# Patient Record
Sex: FEMALE | Race: BLACK OR AFRICAN AMERICAN | Employment: UNEMPLOYED | URBAN - METROPOLITAN AREA
[De-identification: names, ages, dates, MRNs, and addresses within clinical notes are randomized per-mention and may not be internally consistent; named-entity substitution may affect disease eponyms.]

---

## 2017-04-03 ENCOUNTER — HOSPITAL ENCOUNTER (OUTPATIENT)
Dept: PULMONOLOGY | Age: 57
Discharge: HOME | End: 2017-04-03
Payer: MEDICAID

## 2017-04-03 PROCEDURE — 6360000002 HC RX W HCPCS: Performed by: INTERNAL MEDICINE

## 2017-04-03 PROCEDURE — 94728 AIRWY RESIST BY OSCILLOMETRY: CPT

## 2017-04-03 PROCEDURE — 94060 EVALUATION OF WHEEZING: CPT

## 2017-04-03 PROCEDURE — 94726 PLETHYSMOGRAPHY LUNG VOLUMES: CPT

## 2017-04-03 RX ORDER — ALBUTEROL SULFATE 2.5 MG/3ML
2.5 SOLUTION RESPIRATORY (INHALATION) ONCE
Status: COMPLETED | OUTPATIENT
Start: 2017-04-03 | End: 2017-04-03

## 2017-04-03 RX ADMIN — ALBUTEROL SULFATE 2.5 MG: 2.5 SOLUTION RESPIRATORY (INHALATION) at 13:57

## 2017-09-15 ENCOUNTER — APPOINTMENT (OUTPATIENT)
Dept: GENERAL RADIOLOGY | Facility: CLINIC | Age: 57
End: 2017-09-15
Payer: MEDICAID

## 2017-09-15 ENCOUNTER — HOSPITAL ENCOUNTER (EMERGENCY)
Facility: CLINIC | Age: 57
Discharge: HOME | End: 2017-09-15
Attending: EMERGENCY MEDICINE
Payer: MEDICAID

## 2017-09-15 VITALS
TEMPERATURE: 98.2 F | SYSTOLIC BLOOD PRESSURE: 169 MMHG | OXYGEN SATURATION: 99 % | DIASTOLIC BLOOD PRESSURE: 92 MMHG | BODY MASS INDEX: 33.13 KG/M2 | HEART RATE: 84 BPM | HEIGHT: 63 IN | WEIGHT: 187 LBS | RESPIRATION RATE: 16 BRPM

## 2017-09-15 DIAGNOSIS — J40 BRONCHITIS: Primary | ICD-10-CM

## 2017-09-15 PROCEDURE — 99283 EMERGENCY DEPT VISIT LOW MDM: CPT

## 2017-09-15 PROCEDURE — 6370000000 HC RX 637 (ALT 250 FOR IP): Performed by: EMERGENCY MEDICINE

## 2017-09-15 PROCEDURE — 71020 XR CHEST STANDARD TWO VW: CPT

## 2017-09-15 RX ORDER — AZITHROMYCIN 250 MG/1
500 TABLET, FILM COATED ORAL ONCE
Status: COMPLETED | OUTPATIENT
Start: 2017-09-15 | End: 2017-09-15

## 2017-09-15 RX ORDER — ALBUTEROL SULFATE 90 UG/1
2 AEROSOL, METERED RESPIRATORY (INHALATION) EVERY 6 HOURS PRN
Status: DISCONTINUED | OUTPATIENT
Start: 2017-09-15 | End: 2017-09-16 | Stop reason: HOSPADM

## 2017-09-15 RX ORDER — AZITHROMYCIN 250 MG/1
TABLET, FILM COATED ORAL
Qty: 1 PACKET | Refills: 0 | Status: SHIPPED | OUTPATIENT
Start: 2017-09-15 | End: 2018-03-22 | Stop reason: CLARIF

## 2017-09-15 RX ADMIN — AZITHROMYCIN 500 MG: 250 TABLET, FILM COATED ORAL at 22:19

## 2017-09-15 RX ADMIN — ALBUTEROL SULFATE 2 PUFF: 108 AEROSOL, METERED RESPIRATORY (INHALATION) at 22:19

## 2017-09-15 ASSESSMENT — PAIN DESCRIPTION - LOCATION: LOCATION: BACK;CHEST

## 2017-09-15 ASSESSMENT — PAIN SCALES - GENERAL: PAINLEVEL_OUTOF10: 4

## 2017-09-15 ASSESSMENT — PAIN DESCRIPTION - PAIN TYPE: TYPE: ACUTE PAIN

## 2017-11-20 ENCOUNTER — OFFICE VISIT (OUTPATIENT)
Dept: PODIATRY | Age: 57
End: 2017-11-20
Payer: MEDICAID

## 2017-11-20 VITALS
HEIGHT: 63 IN | HEART RATE: 83 BPM | WEIGHT: 200 LBS | SYSTOLIC BLOOD PRESSURE: 136 MMHG | DIASTOLIC BLOOD PRESSURE: 72 MMHG | BODY MASS INDEX: 35.44 KG/M2

## 2017-11-20 DIAGNOSIS — B35.1 DERMATOPHYTOSIS OF NAIL: Primary | ICD-10-CM

## 2017-11-20 DIAGNOSIS — M79.2 NEUROPATHIC PAIN: ICD-10-CM

## 2017-11-20 DIAGNOSIS — E11.42 TYPE 2 DIABETES MELLITUS WITH DIABETIC POLYNEUROPATHY, WITHOUT LONG-TERM CURRENT USE OF INSULIN (HCC): ICD-10-CM

## 2017-11-20 PROCEDURE — 1036F TOBACCO NON-USER: CPT | Performed by: PODIATRIST

## 2017-11-20 PROCEDURE — G8484 FLU IMMUNIZE NO ADMIN: HCPCS | Performed by: PODIATRIST

## 2017-11-20 PROCEDURE — 99213 OFFICE O/P EST LOW 20 MIN: CPT | Performed by: PODIATRIST

## 2017-11-20 PROCEDURE — G8417 CALC BMI ABV UP PARAM F/U: HCPCS | Performed by: PODIATRIST

## 2017-11-20 PROCEDURE — 3017F COLORECTAL CA SCREEN DOC REV: CPT | Performed by: PODIATRIST

## 2017-11-20 PROCEDURE — 3014F SCREEN MAMMO DOC REV: CPT | Performed by: PODIATRIST

## 2017-11-20 PROCEDURE — 3046F HEMOGLOBIN A1C LEVEL >9.0%: CPT | Performed by: PODIATRIST

## 2017-11-20 PROCEDURE — G8428 CUR MEDS NOT DOCUMENT: HCPCS | Performed by: PODIATRIST

## 2017-11-20 ASSESSMENT — ENCOUNTER SYMPTOMS
NAUSEA: 0
DIARRHEA: 0
CONSTIPATION: 0
COLOR CHANGE: 1
VOMITING: 0

## 2017-11-20 NOTE — PROGRESS NOTES
[facility]  Return Patient     Danial Franz 61 y.o. female that presents for follow-up of   Chief Complaint   Patient presents with    Nail Problem     diabetic foot check/ painful feet    Other     last blood sugar: 119     Bilateral leg and foot pain. This still occurs when standing, walking, sitting, and lying down. She is on Gabapentin, only at night. She complains of numbness, tingling and burning in her toes, worse at night and worse in the right leg and foot . She has cramping and tightness in her back, thighs, and calves, right still worse than left. BG is controlled        Allergies   Allergen Reactions    Codeine Hives       Past Medical History:   Diagnosis Date    Arthritis     Bulging disc 07/13    c4 c5    Depression 10/3/2014    Diabetic neuropathy ([facility] [address]     all L.L.    Glaucoma     LT EYE    History of blood transfusion     [address]    Hypertension     Irregular heart beat     TIA (transient ischemic attack) 07/22/13    Type II or unspecified type diabetes mellitus without mention of complication, not stated as uncontrolled        Prior to Admission medications    Medication Sig Start Date End Date Taking? Authorizing Provider   azithromycin (ZITHROMAX) 250 MG tablet Take 2 tablets (500 mg) on Day 1, followed by 1 tablet (250 mg) once daily on Days 2 through 5. 9/15/17   Jah Dalal,    ciclopirox (LOPROX) 0.77 % cream Apply topically to feet 2 times daily.  9/15/16   Yo Rodriguez DPM   ALPRAZolam [facility]) 0.25 MG tablet Take 0.25 mg by mouth 3 times daily as needed  3/3/16   Historical Provider, MD   escitalopram (LEXAPRO) 10 MG tablet Take 10 mg by mouth nightly  3/3/16   Historical Provider, MD   gabapentin (NEURONTIN) 300 MG capsule Take 300 mg by mouth nightly  3/3/16   Historical Provider, MD   irbesartan (AVAPRO) 150 MG tablet Take 150 mg by mouth nightly  4/25/16   Historical Provider, MD   lubiprostone (AMITIZA) 24 MCG capsule Take 24 mcg

## 2018-03-22 ENCOUNTER — OFFICE VISIT (OUTPATIENT)
Dept: FAMILY MEDICINE CLINIC | Age: 58
End: 2018-03-22
Payer: MEDICAID

## 2018-03-22 VITALS
HEART RATE: 82 BPM | DIASTOLIC BLOOD PRESSURE: 73 MMHG | BODY MASS INDEX: 33.88 KG/M2 | OXYGEN SATURATION: 97 % | WEIGHT: 191.2 LBS | HEIGHT: 63 IN | TEMPERATURE: 97.6 F | SYSTOLIC BLOOD PRESSURE: 126 MMHG

## 2018-03-22 DIAGNOSIS — R06.02 SOB (SHORTNESS OF BREATH): ICD-10-CM

## 2018-03-22 DIAGNOSIS — Z11.59 NEED FOR HEPATITIS C SCREENING TEST: ICD-10-CM

## 2018-03-22 DIAGNOSIS — R05.9 COUGH: ICD-10-CM

## 2018-03-22 DIAGNOSIS — E53.9 VITAMIN B DEFICIENCY: ICD-10-CM

## 2018-03-22 DIAGNOSIS — Z13.29 SCREENING FOR THYROID DISORDER: ICD-10-CM

## 2018-03-22 DIAGNOSIS — R07.9 CHEST PAIN, UNSPECIFIED TYPE: ICD-10-CM

## 2018-03-22 DIAGNOSIS — E55.9 VITAMIN D DEFICIENCY: ICD-10-CM

## 2018-03-22 DIAGNOSIS — Z12.12 SCREENING FOR COLORECTAL CANCER: ICD-10-CM

## 2018-03-22 DIAGNOSIS — G62.9 NEUROPATHY: ICD-10-CM

## 2018-03-22 DIAGNOSIS — Z12.11 SCREENING FOR COLORECTAL CANCER: ICD-10-CM

## 2018-03-22 DIAGNOSIS — Z13.220 SCREENING, LIPID: ICD-10-CM

## 2018-03-22 DIAGNOSIS — Z11.4 SCREENING FOR HIV (HUMAN IMMUNODEFICIENCY VIRUS): ICD-10-CM

## 2018-03-22 DIAGNOSIS — E13.9 DIABETES 1.5, MANAGED AS TYPE 2 (HCC): ICD-10-CM

## 2018-03-22 DIAGNOSIS — R00.2 HEART PALPITATIONS: Primary | ICD-10-CM

## 2018-03-22 PROCEDURE — 1036F TOBACCO NON-USER: CPT | Performed by: FAMILY MEDICINE

## 2018-03-22 PROCEDURE — G8417 CALC BMI ABV UP PARAM F/U: HCPCS | Performed by: FAMILY MEDICINE

## 2018-03-22 PROCEDURE — G8484 FLU IMMUNIZE NO ADMIN: HCPCS | Performed by: FAMILY MEDICINE

## 2018-03-22 PROCEDURE — G8427 DOCREV CUR MEDS BY ELIG CLIN: HCPCS | Performed by: FAMILY MEDICINE

## 2018-03-22 PROCEDURE — 3046F HEMOGLOBIN A1C LEVEL >9.0%: CPT | Performed by: FAMILY MEDICINE

## 2018-03-22 PROCEDURE — 3017F COLORECTAL CA SCREEN DOC REV: CPT | Performed by: FAMILY MEDICINE

## 2018-03-22 PROCEDURE — 99204 OFFICE O/P NEW MOD 45 MIN: CPT | Performed by: FAMILY MEDICINE

## 2018-03-22 PROCEDURE — 3014F SCREEN MAMMO DOC REV: CPT | Performed by: FAMILY MEDICINE

## 2018-03-22 RX ORDER — BENZONATATE 100 MG/1
100 CAPSULE ORAL
COMMUNITY
Start: 2018-03-02 | End: 2018-04-12 | Stop reason: CLARIF

## 2018-03-22 ASSESSMENT — ENCOUNTER SYMPTOMS
NAUSEA: 0
SHORTNESS OF BREATH: 1
COUGH: 1
DIARRHEA: 0
VOMITING: 0
ABDOMINAL DISTENTION: 0
EYE DISCHARGE: 0
RECTAL PAIN: 0
BLOOD IN STOOL: 0
ANAL BLEEDING: 0
BACK PAIN: 0
CHEST TIGHTNESS: 1
EYE PAIN: 0
ABDOMINAL PAIN: 0
SINUS PRESSURE: 0
CONSTIPATION: 0
VOICE CHANGE: 0
TROUBLE SWALLOWING: 0
EYE REDNESS: 0
COLOR CHANGE: 0

## 2018-03-22 ASSESSMENT — PATIENT HEALTH QUESTIONNAIRE - PHQ9
SUM OF ALL RESPONSES TO PHQ9 QUESTIONS 1 & 2: 2
2. FEELING DOWN, DEPRESSED OR HOPELESS: 1
SUM OF ALL RESPONSES TO PHQ QUESTIONS 1-9: 2
SUM OF ALL RESPONSES TO PHQ QUESTIONS 1-9: 0
SUM OF ALL RESPONSES TO PHQ9 QUESTIONS 1 & 2: 0
1. LITTLE INTEREST OR PLEASURE IN DOING THINGS: 1
1. LITTLE INTEREST OR PLEASURE IN DOING THINGS: 0
2. FEELING DOWN, DEPRESSED OR HOPELESS: 0

## 2018-03-28 PROBLEM — Z86.73 HISTORY OF TIA (TRANSIENT ISCHEMIC ATTACK): Status: ACTIVE | Noted: 2018-03-28

## 2018-03-28 PROBLEM — R06.02 SOB (SHORTNESS OF BREATH): Status: ACTIVE | Noted: 2018-03-28

## 2018-03-28 PROBLEM — R93.1 ABNORMAL ECHOCARDIOGRAM: Status: ACTIVE | Noted: 2018-03-28

## 2018-03-28 PROBLEM — R07.9 CHEST PAIN: Status: ACTIVE | Noted: 2018-03-28

## 2018-03-28 LAB
ALBUMIN SERPL-MCNC: NORMAL G/DL
ALP BLD-CCNC: NORMAL U/L
ALT SERPL-CCNC: NORMAL U/L
ANION GAP SERPL CALCULATED.3IONS-SCNC: NORMAL MMOL/L
ANTIBODY: NORMAL
AST SERPL-CCNC: NORMAL U/L
AVERAGE GLUCOSE: 212
B-TYPE NATRIURETIC PEPTIDE: NORMAL PG/ML
BASOPHILS ABSOLUTE: NORMAL /ΜL
BASOPHILS RELATIVE PERCENT: NORMAL %
BILIRUB SERPL-MCNC: NORMAL MG/DL (ref 0.1–1.4)
BUN BLDV-MCNC: NORMAL MG/DL
CALCIUM SERPL-MCNC: NORMAL MG/DL
CHLORIDE BLD-SCNC: NORMAL MMOL/L
CHOLESTEROL, TOTAL: 185 MG/DL
CHOLESTEROL/HDL RATIO: 3.5
CO2: NORMAL MMOL/L
CREAT SERPL-MCNC: 0.7 MG/DL
EOSINOPHILS ABSOLUTE: NORMAL /ΜL
EOSINOPHILS RELATIVE PERCENT: NORMAL %
GFR CALCULATED: NORMAL
GLUCOSE BLD-MCNC: NORMAL MG/DL
HBA1C MFR BLD: 9 %
HCT VFR BLD CALC: NORMAL % (ref 36–46)
HDLC SERPL-MCNC: 53 MG/DL (ref 35–70)
HEMOGLOBIN: NORMAL G/DL (ref 12–16)
HIV AG/AB: NORMAL
LDL CHOLESTEROL CALCULATED: 121 MG/DL (ref 0–160)
LYMPHOCYTES ABSOLUTE: NORMAL /ΜL
LYMPHOCYTES RELATIVE PERCENT: NORMAL %
MCH RBC QN AUTO: NORMAL PG
MCHC RBC AUTO-ENTMCNC: NORMAL G/DL
MCV RBC AUTO: NORMAL FL
MONOCYTES ABSOLUTE: NORMAL /ΜL
MONOCYTES RELATIVE PERCENT: NORMAL %
NEUTROPHILS ABSOLUTE: NORMAL /ΜL
NEUTROPHILS RELATIVE PERCENT: NORMAL %
PLATELET # BLD: NORMAL K/ΜL
PMV BLD AUTO: NORMAL FL
POTASSIUM SERPL-SCNC: 3.9 MMOL/L
RBC # BLD: NORMAL 10^6/ΜL
SODIUM BLD-SCNC: NORMAL MMOL/L
T3 FREE: NORMAL
T4 FREE: NORMAL
TOTAL PROTEIN: NORMAL
TRIGL SERPL-MCNC: 56 MG/DL
TSH SERPL DL<=0.05 MIU/L-ACNC: NORMAL UIU/ML
VITAMIN B-12: NORMAL
VITAMIN D 25-HYDROXY: NORMAL
VITAMIN D2, 25 HYDROXY: NORMAL
VITAMIN D3,25 HYDROXY: NORMAL
VLDLC SERPL CALC-MCNC: 11 MG/DL
WBC # BLD: NORMAL 10^3/ML

## 2018-03-29 ENCOUNTER — OFFICE VISIT (OUTPATIENT)
Dept: FAMILY MEDICINE CLINIC | Age: 58
End: 2018-03-29
Payer: MEDICAID

## 2018-03-29 VITALS
BODY MASS INDEX: 33.95 KG/M2 | WEIGHT: 191.6 LBS | DIASTOLIC BLOOD PRESSURE: 70 MMHG | SYSTOLIC BLOOD PRESSURE: 114 MMHG | HEART RATE: 80 BPM | TEMPERATURE: 97.4 F | HEIGHT: 63 IN | OXYGEN SATURATION: 98 %

## 2018-03-29 DIAGNOSIS — Z98.890 HISTORY OF COLONOSCOPY WITH POLYPECTOMY: ICD-10-CM

## 2018-03-29 DIAGNOSIS — E66.9 OBESITY, UNSPECIFIED CLASSIFICATION, UNSPECIFIED OBESITY TYPE, UNSPECIFIED WHETHER SERIOUS COMORBIDITY PRESENT: ICD-10-CM

## 2018-03-29 DIAGNOSIS — Z86.010 HISTORY OF COLONOSCOPY WITH POLYPECTOMY: ICD-10-CM

## 2018-03-29 DIAGNOSIS — Z12.31 SCREENING MAMMOGRAM, ENCOUNTER FOR: ICD-10-CM

## 2018-03-29 DIAGNOSIS — R93.1 ABNORMAL ECHOCARDIOGRAM: ICD-10-CM

## 2018-03-29 DIAGNOSIS — I10 ESSENTIAL HYPERTENSION: ICD-10-CM

## 2018-03-29 DIAGNOSIS — E78.5 DYSLIPIDEMIA: ICD-10-CM

## 2018-03-29 DIAGNOSIS — E11.42 DIABETIC POLYNEUROPATHY ASSOCIATED WITH TYPE 2 DIABETES MELLITUS (HCC): ICD-10-CM

## 2018-03-29 DIAGNOSIS — E11.9 TYPE 2 DIABETES MELLITUS WITHOUT COMPLICATION, WITHOUT LONG-TERM CURRENT USE OF INSULIN (HCC): Primary | ICD-10-CM

## 2018-03-29 DIAGNOSIS — R00.2 HEART PALPITATIONS: ICD-10-CM

## 2018-03-29 DIAGNOSIS — R06.02 SOB (SHORTNESS OF BREATH): ICD-10-CM

## 2018-03-29 PROCEDURE — 1036F TOBACCO NON-USER: CPT | Performed by: FAMILY MEDICINE

## 2018-03-29 PROCEDURE — 3014F SCREEN MAMMO DOC REV: CPT | Performed by: FAMILY MEDICINE

## 2018-03-29 PROCEDURE — 3046F HEMOGLOBIN A1C LEVEL >9.0%: CPT | Performed by: FAMILY MEDICINE

## 2018-03-29 PROCEDURE — G8417 CALC BMI ABV UP PARAM F/U: HCPCS | Performed by: FAMILY MEDICINE

## 2018-03-29 PROCEDURE — 99214 OFFICE O/P EST MOD 30 MIN: CPT | Performed by: FAMILY MEDICINE

## 2018-03-29 PROCEDURE — G8427 DOCREV CUR MEDS BY ELIG CLIN: HCPCS | Performed by: FAMILY MEDICINE

## 2018-03-29 PROCEDURE — 3017F COLORECTAL CA SCREEN DOC REV: CPT | Performed by: FAMILY MEDICINE

## 2018-03-29 PROCEDURE — G8484 FLU IMMUNIZE NO ADMIN: HCPCS | Performed by: FAMILY MEDICINE

## 2018-03-29 RX ORDER — GLIMEPIRIDE 2 MG/1
2 TABLET ORAL
Qty: 30 TABLET | Refills: 3 | Status: CANCELLED | OUTPATIENT
Start: 2018-03-29

## 2018-03-29 RX ORDER — GABAPENTIN 300 MG/1
300 CAPSULE ORAL NIGHTLY
Qty: 90 CAPSULE | Refills: 0 | Status: SHIPPED | OUTPATIENT
Start: 2018-03-29 | End: 2018-08-22 | Stop reason: SDUPTHER

## 2018-03-29 RX ORDER — GLIMEPIRIDE 4 MG/1
4 TABLET ORAL EVERY MORNING
Qty: 90 TABLET | Refills: 0 | Status: SHIPPED | OUTPATIENT
Start: 2018-03-29 | End: 2018-08-22 | Stop reason: SDUPTHER

## 2018-03-29 RX ORDER — ATORVASTATIN CALCIUM 10 MG/1
10 TABLET, FILM COATED ORAL DAILY
Qty: 30 TABLET | Refills: 3 | Status: SHIPPED | OUTPATIENT
Start: 2018-03-29 | End: 2018-08-22 | Stop reason: SDUPTHER

## 2018-03-29 ASSESSMENT — ENCOUNTER SYMPTOMS
COUGH: 0
BLOOD IN STOOL: 0
VOICE CHANGE: 0
COLOR CHANGE: 0
NAUSEA: 0
SINUS PRESSURE: 0
CHEST TIGHTNESS: 0
EYE REDNESS: 0
TROUBLE SWALLOWING: 0
SHORTNESS OF BREATH: 0
EYE PAIN: 0
ANAL BLEEDING: 0
ABDOMINAL DISTENTION: 0
CONSTIPATION: 0
EYE DISCHARGE: 0
RECTAL PAIN: 0
DIARRHEA: 0
VOMITING: 0
BACK PAIN: 0
ABDOMINAL PAIN: 0

## 2018-03-29 ASSESSMENT — PATIENT HEALTH QUESTIONNAIRE - PHQ9
2. FEELING DOWN, DEPRESSED OR HOPELESS: 0
SUM OF ALL RESPONSES TO PHQ QUESTIONS 1-9: 0
SUM OF ALL RESPONSES TO PHQ9 QUESTIONS 1 & 2: 0
1. LITTLE INTEREST OR PLEASURE IN DOING THINGS: 0

## 2018-03-29 NOTE — PROGRESS NOTES
Subjective:      Patient ID: Ishaan Buck is a 61 y.o. female. HPI  Here for follow up of SOB and heart palpitations as well as review of labs- Her BS fasting was > 230. She has been advised to keep BS chart and recheck in 2 weeks and consider insulin if adjusting oral meds will not work. Col is high as well. Review of Systems   Constitutional: Negative for activity change, appetite change and fatigue. HENT: Negative for dental problem, ear pain, hearing loss, postnasal drip, sinus pressure, sneezing, tinnitus, trouble swallowing and voice change. Eyes: Negative for pain, discharge, redness and visual disturbance. Respiratory: Negative for cough, chest tightness and shortness of breath. Cardiovascular: Negative for chest pain, palpitations and leg swelling. Gastrointestinal: Negative for abdominal distention, abdominal pain, anal bleeding, blood in stool, constipation, diarrhea, nausea, rectal pain and vomiting. Endocrine: Negative for cold intolerance, heat intolerance, polydipsia, polyphagia and polyuria. Genitourinary: Negative for decreased urine volume, difficulty urinating, dyspareunia, dysuria, enuresis, flank pain, frequency, genital sores, hematuria, menstrual problem, pelvic pain, urgency, vaginal bleeding and vaginal discharge. Musculoskeletal: Negative for arthralgias, back pain, gait problem, joint swelling, myalgias, neck pain and neck stiffness. Skin: Negative for color change, pallor and rash. Allergic/Immunologic: Negative for environmental allergies, food allergies and immunocompromised state. Neurological: Negative for dizziness, tremors, seizures, syncope, facial asymmetry, speech difficulty, weakness, light-headedness, numbness and headaches. Hematological: Negative for adenopathy. Does not bruise/bleed easily. Psychiatric/Behavioral: Negative for agitation, behavioral problems, confusion, decreased concentration, sleep disturbance and suicidal ideas.  The patient is not nervous/anxious. Objective:   Physical Exam   Constitutional: She is oriented to person, place, and time. She appears well-developed and well-nourished. HENT:   Head: Normocephalic and atraumatic. Right Ear: External ear normal.   Left Ear: External ear normal.   Nose: Nose normal.   Mouth/Throat: Oropharynx is clear and moist. No oropharyngeal exudate. Eyes: Conjunctivae and EOM are normal. Pupils are equal, round, and reactive to light. Right eye exhibits no discharge. Left eye exhibits no discharge. No scleral icterus. Neck: Normal range of motion. Neck supple. No JVD present. No tracheal deviation present. No thyromegaly present. Cardiovascular: Normal rate, regular rhythm, normal heart sounds and intact distal pulses. Exam reveals no gallop and no friction rub. No murmur heard. Pulmonary/Chest: Effort normal and breath sounds normal. No respiratory distress. She has no wheezes. She has no rales. She exhibits no tenderness. Abdominal: Soft. Bowel sounds are normal. She exhibits no distension and no mass. There is no tenderness. There is no rebound and no guarding. Musculoskeletal: Normal range of motion. She exhibits no edema or tenderness. Lymphadenopathy:     She has no cervical adenopathy. Neurological: She is alert and oriented to person, place, and time. She has normal reflexes. No cranial nerve deficit. Coordination normal.   Skin: Skin is warm and dry. No rash noted. Psychiatric: She has a normal mood and affect. Assessment:      1. Type 2 diabetes mellitus without complication, without long-term current use of insulin (HCC)  metFORMIN (GLUCOPHAGE) 1000 MG tablet    linagliptin (TRADJENTA) 5 MG tablet    [place]   2. Diabetic polyneuropathy associated with type 2 diabetes mellitus (HCC)  gabapentin (NEURONTIN) 300 MG capsule   3.  Dyslipidemia  atorvastatin (LIPITOR) 10 MG tablet    6 [employer]CO[employer]ality

## 2018-03-30 ENCOUNTER — TELEPHONE (OUTPATIENT)
Dept: FAMILY MEDICINE CLINIC | Age: 58
End: 2018-03-30

## 2018-03-30 DIAGNOSIS — R07.9 CHEST PAIN, UNSPECIFIED TYPE: ICD-10-CM

## 2018-03-30 DIAGNOSIS — R05.9 COUGH: ICD-10-CM

## 2018-03-30 DIAGNOSIS — E11.9 TYPE 2 DIABETES MELLITUS WITHOUT COMPLICATION, WITHOUT LONG-TERM CURRENT USE OF INSULIN (HCC): ICD-10-CM

## 2018-03-30 DIAGNOSIS — Z13.29 SCREENING FOR THYROID DISORDER: ICD-10-CM

## 2018-03-30 DIAGNOSIS — Z11.59 NEED FOR HEPATITIS C SCREENING TEST: ICD-10-CM

## 2018-03-30 DIAGNOSIS — Z13.220 SCREENING, LIPID: ICD-10-CM

## 2018-03-30 DIAGNOSIS — R06.02 SOB (SHORTNESS OF BREATH): ICD-10-CM

## 2018-03-30 DIAGNOSIS — E53.9 VITAMIN B DEFICIENCY: ICD-10-CM

## 2018-03-30 RX ORDER — ALOGLIPTIN 25 MG/1
25 TABLET, FILM COATED ORAL DAILY
Qty: 30 TABLET | Refills: 0 | Status: SHIPPED | OUTPATIENT
Start: 2018-03-30 | End: 2018-03-30 | Stop reason: CLARIF

## 2018-03-30 RX ORDER — ALOGLIPTIN 25 MG/1
25 TABLET, FILM COATED ORAL DAILY
Qty: 30 TABLET | Refills: 1 | Status: SHIPPED | OUTPATIENT
Start: 2018-03-30 | End: 2018-04-12 | Stop reason: CLARIF

## 2018-03-30 RX ORDER — ALOGLIPTIN 25 MG/1
25 TABLET, FILM COATED ORAL DAILY
Qty: 30 TABLET | Refills: 0 | Status: SHIPPED | OUTPATIENT
Start: 2018-03-30 | End: 2018-04-12 | Stop reason: CLARIF

## 2018-03-30 NOTE — TELEPHONE ENCOUNTER
Pt states that the pharmacy told her that her tradjenta needs a prior auth or give her something different that they will cover

## 2018-04-03 DIAGNOSIS — Z11.4 SCREENING FOR HIV (HUMAN IMMUNODEFICIENCY VIRUS): ICD-10-CM

## 2018-04-03 DIAGNOSIS — E13.9 DIABETES 1.5, MANAGED AS TYPE 2 (HCC): ICD-10-CM

## 2018-04-03 DIAGNOSIS — E53.9 VITAMIN B DEFICIENCY: ICD-10-CM

## 2018-04-03 DIAGNOSIS — E55.9 VITAMIN D DEFICIENCY: ICD-10-CM

## 2018-04-03 DIAGNOSIS — R00.2 HEART PALPITATIONS: ICD-10-CM

## 2018-04-03 DIAGNOSIS — Z13.29 SCREENING FOR THYROID DISORDER: ICD-10-CM

## 2018-04-03 RX ORDER — ALOGLIPTIN 25 MG/1
25 TABLET, FILM COATED ORAL DAILY
Qty: 30 TABLET | Refills: 2 | Status: SHIPPED | OUTPATIENT
Start: 2018-04-03 | End: 2018-08-22 | Stop reason: SDUPTHER

## 2018-04-04 ENCOUNTER — HOSPITAL ENCOUNTER (OUTPATIENT)
Dept: CARDIAC CATH/INVASIVE PROCEDURES | Age: 58
Discharge: HOME | End: 2018-04-04
Attending: INTERNAL MEDICINE | Admitting: INTERNAL MEDICINE
Payer: MEDICAID

## 2018-04-04 VITALS
BODY MASS INDEX: 33.84 KG/M2 | DIASTOLIC BLOOD PRESSURE: 72 MMHG | TEMPERATURE: 98.2 F | RESPIRATION RATE: 16 BRPM | WEIGHT: 191 LBS | OXYGEN SATURATION: 98 % | SYSTOLIC BLOOD PRESSURE: 138 MMHG | HEIGHT: 63 IN | HEART RATE: 79 BPM

## 2018-04-04 DIAGNOSIS — E11.9 TYPE 2 DIABETES MELLITUS WITHOUT COMPLICATION, WITHOUT LONG-TERM CURRENT USE OF INSULIN (HCC): ICD-10-CM

## 2018-04-04 PROBLEM — R94.39 POSITIVE CARDIAC STRESS TEST: Status: ACTIVE | Noted: 2018-04-04

## 2018-04-04 LAB
ANION GAP SERPL CALCULATED.3IONS-SCNC: 14 MMOL/L (ref 9–17)
BUN BLDV-MCNC: 15 MG/DL (ref 6–20)
BUN/CREAT BLD: 23 (ref 9–20)
CALCIUM SERPL-MCNC: 9.5 MG/DL (ref 8.6–10.4)
CHLORIDE BLD-SCNC: 101 MMOL/L (ref 98–107)
CO2: 26 MMOL/L (ref 20–31)
CREAT SERPL-MCNC: 0.66 MG/DL (ref 0.5–0.9)
GFR AFRICAN AMERICAN: >60 ML/MIN
GFR NON-AFRICAN AMERICAN: >60 ML/MIN
GFR SERPL CREATININE-BSD FRML MDRD: ABNORMAL ML/MIN/{1.73_M2}
GFR SERPL CREATININE-BSD FRML MDRD: ABNORMAL ML/MIN/{1.73_M2}
GLUCOSE BLD-MCNC: 108 MG/DL (ref 70–99)
HCT VFR BLD CALC: 41.5 % (ref 36–46)
HEMOGLOBIN: 13.3 G/DL (ref 12–16)
MCH RBC QN AUTO: 27.8 PG (ref 26–34)
MCHC RBC AUTO-ENTMCNC: 32.2 G/DL (ref 31–37)
MCV RBC AUTO: 86.5 FL (ref 80–100)
NRBC AUTOMATED: ABNORMAL PER 100 WBC
PDW BLD-RTO: 15 % (ref 11.5–14.5)
PLATELET # BLD: 222 K/UL (ref 130–400)
PMV BLD AUTO: ABNORMAL FL (ref 6–12)
POTASSIUM SERPL-SCNC: 3.8 MMOL/L (ref 3.7–5.3)
RBC # BLD: 4.8 M/UL (ref 4–5.2)
SODIUM BLD-SCNC: 141 MMOL/L (ref 135–144)
WBC # BLD: 8.9 K/UL (ref 3.5–11)

## 2018-04-04 PROCEDURE — C1725 CATH, TRANSLUMIN NON-LASER: HCPCS

## 2018-04-04 PROCEDURE — C1760 CLOSURE DEV, VASC: HCPCS

## 2018-04-04 PROCEDURE — C1894 INTRO/SHEATH, NON-LASER: HCPCS

## 2018-04-04 PROCEDURE — 6360000002 HC RX W HCPCS

## 2018-04-04 PROCEDURE — 93458 L HRT ARTERY/VENTRICLE ANGIO: CPT | Performed by: INTERNAL MEDICINE

## 2018-04-04 PROCEDURE — 2500000003 HC RX 250 WO HCPCS

## 2018-04-04 PROCEDURE — 99152 MOD SED SAME PHYS/QHP 5/>YRS: CPT | Performed by: INTERNAL MEDICINE

## 2018-04-04 PROCEDURE — 80048 BASIC METABOLIC PNL TOTAL CA: CPT

## 2018-04-04 PROCEDURE — 6370000000 HC RX 637 (ALT 250 FOR IP): Performed by: INTERNAL MEDICINE

## 2018-04-04 PROCEDURE — 85027 COMPLETE CBC AUTOMATED: CPT

## 2018-04-04 PROCEDURE — 2580000003 HC RX 258: Performed by: INTERNAL MEDICINE

## 2018-04-04 RX ORDER — SODIUM CHLORIDE 0.9 % (FLUSH) 0.9 %
10 SYRINGE (ML) INJECTION EVERY 12 HOURS SCHEDULED
Status: DISCONTINUED | OUTPATIENT
Start: 2018-04-04 | End: 2018-04-04 | Stop reason: HOSPADM

## 2018-04-04 RX ORDER — SODIUM CHLORIDE 0.9 % (FLUSH) 0.9 %
10 SYRINGE (ML) INJECTION PRN
Status: DISCONTINUED | OUTPATIENT
Start: 2018-04-04 | End: 2018-04-04 | Stop reason: HOSPADM

## 2018-04-04 RX ORDER — ASPIRIN 81 MG/1
81 TABLET, CHEWABLE ORAL ONCE
Status: COMPLETED | OUTPATIENT
Start: 2018-04-04 | End: 2018-04-04

## 2018-04-04 RX ORDER — ONDANSETRON 2 MG/ML
4 INJECTION INTRAMUSCULAR; INTRAVENOUS EVERY 6 HOURS PRN
Status: DISCONTINUED | OUTPATIENT
Start: 2018-04-04 | End: 2018-04-04 | Stop reason: HOSPADM

## 2018-04-04 RX ORDER — SODIUM CHLORIDE 9 MG/ML
INJECTION, SOLUTION INTRAVENOUS CONTINUOUS
Status: DISCONTINUED | OUTPATIENT
Start: 2018-04-04 | End: 2018-04-04 | Stop reason: HOSPADM

## 2018-04-04 RX ORDER — ACETAMINOPHEN 325 MG/1
650 TABLET ORAL EVERY 4 HOURS PRN
Status: DISCONTINUED | OUTPATIENT
Start: 2018-04-04 | End: 2018-04-04 | Stop reason: HOSPADM

## 2018-04-04 RX ADMIN — ASPIRIN 81 MG 81 MG: 81 TABLET ORAL at 15:10

## 2018-04-04 RX ADMIN — SODIUM CHLORIDE: 9 INJECTION, SOLUTION INTRAVENOUS at 15:14

## 2018-04-04 ASSESSMENT — PAIN SCALES - GENERAL
PAINLEVEL_OUTOF10: 0
PAINLEVEL_OUTOF10: 7
PAINLEVEL_OUTOF10: 0
PAINLEVEL_OUTOF10: 0

## 2018-04-04 ASSESSMENT — PAIN DESCRIPTION - ORIENTATION: ORIENTATION: MID

## 2018-04-04 ASSESSMENT — PAIN DESCRIPTION - DESCRIPTORS: DESCRIPTORS: ACHING

## 2018-04-04 ASSESSMENT — PAIN DESCRIPTION - FREQUENCY: FREQUENCY: INTERMITTENT

## 2018-04-04 ASSESSMENT — PAIN DESCRIPTION - LOCATION: LOCATION: CHEST

## 2018-04-04 ASSESSMENT — PAIN DESCRIPTION - PAIN TYPE: TYPE: ACUTE PAIN

## 2018-04-04 ASSESSMENT — PAIN DESCRIPTION - DIRECTION: RADIATING_TOWARDS: BACK

## 2018-04-12 ENCOUNTER — OFFICE VISIT (OUTPATIENT)
Dept: FAMILY MEDICINE CLINIC | Age: 58
End: 2018-04-12
Payer: MEDICAID

## 2018-04-12 VITALS
BODY MASS INDEX: 34.05 KG/M2 | HEIGHT: 63 IN | WEIGHT: 192.2 LBS | SYSTOLIC BLOOD PRESSURE: 121 MMHG | TEMPERATURE: 98.2 F | HEART RATE: 77 BPM | RESPIRATION RATE: 12 BRPM | DIASTOLIC BLOOD PRESSURE: 73 MMHG | OXYGEN SATURATION: 97 %

## 2018-04-12 DIAGNOSIS — Z12.11 SCREENING FOR COLORECTAL CANCER: ICD-10-CM

## 2018-04-12 DIAGNOSIS — E78.5 DYSLIPIDEMIA: ICD-10-CM

## 2018-04-12 DIAGNOSIS — Z12.31 SCREENING MAMMOGRAM, ENCOUNTER FOR: Primary | ICD-10-CM

## 2018-04-12 DIAGNOSIS — I10 ESSENTIAL HYPERTENSION: ICD-10-CM

## 2018-04-12 DIAGNOSIS — I49.9 IRREGULAR HEART BEAT: ICD-10-CM

## 2018-04-12 DIAGNOSIS — F32.A DEPRESSION, UNSPECIFIED DEPRESSION TYPE: ICD-10-CM

## 2018-04-12 DIAGNOSIS — E11.8 TYPE 2 DIABETES MELLITUS WITH COMPLICATION, WITHOUT LONG-TERM CURRENT USE OF INSULIN (HCC): ICD-10-CM

## 2018-04-12 DIAGNOSIS — Z12.12 SCREENING FOR COLORECTAL CANCER: ICD-10-CM

## 2018-04-12 PROCEDURE — 2022F DILAT RTA XM EVC RTNOPTHY: CPT | Performed by: FAMILY MEDICINE

## 2018-04-12 PROCEDURE — 3045F PR MOST RECENT HEMOGLOBIN A1C LEVEL 7.0-9.0%: CPT | Performed by: FAMILY MEDICINE

## 2018-04-12 PROCEDURE — 1036F TOBACCO NON-USER: CPT | Performed by: FAMILY MEDICINE

## 2018-04-12 PROCEDURE — 3014F SCREEN MAMMO DOC REV: CPT | Performed by: FAMILY MEDICINE

## 2018-04-12 PROCEDURE — G8417 CALC BMI ABV UP PARAM F/U: HCPCS | Performed by: FAMILY MEDICINE

## 2018-04-12 PROCEDURE — G8427 DOCREV CUR MEDS BY ELIG CLIN: HCPCS | Performed by: FAMILY MEDICINE

## 2018-04-12 PROCEDURE — 3017F COLORECTAL CA SCREEN DOC REV: CPT | Performed by: FAMILY MEDICINE

## 2018-04-12 PROCEDURE — 99214 OFFICE O/P EST MOD 30 MIN: CPT | Performed by: FAMILY MEDICINE

## 2018-04-12 ASSESSMENT — ENCOUNTER SYMPTOMS
SINUS PRESSURE: 0
COLOR CHANGE: 0
EYE DISCHARGE: 0
TROUBLE SWALLOWING: 0
EYE PAIN: 0
COUGH: 0
DIARRHEA: 0
BLOOD IN STOOL: 0
SHORTNESS OF BREATH: 0
RECTAL PAIN: 0
EYE REDNESS: 0
CHEST TIGHTNESS: 0
BACK PAIN: 0
ABDOMINAL PAIN: 0
NAUSEA: 0
CONSTIPATION: 0
VOICE CHANGE: 0
VOMITING: 0
ABDOMINAL DISTENTION: 0
ANAL BLEEDING: 0

## 2018-08-22 ENCOUNTER — OFFICE VISIT (OUTPATIENT)
Dept: FAMILY MEDICINE CLINIC | Age: 58
End: 2018-08-22
Payer: MEDICAID

## 2018-08-22 VITALS
WEIGHT: 188.8 LBS | DIASTOLIC BLOOD PRESSURE: 64 MMHG | SYSTOLIC BLOOD PRESSURE: 122 MMHG | HEIGHT: 63 IN | HEART RATE: 79 BPM | BODY MASS INDEX: 33.45 KG/M2 | OXYGEN SATURATION: 98 %

## 2018-08-22 DIAGNOSIS — G62.9 NEUROPATHY: ICD-10-CM

## 2018-08-22 DIAGNOSIS — Z86.73 HISTORY OF TIA (TRANSIENT ISCHEMIC ATTACK): ICD-10-CM

## 2018-08-22 DIAGNOSIS — E66.9 OBESITY, UNSPECIFIED CLASSIFICATION, UNSPECIFIED OBESITY TYPE, UNSPECIFIED WHETHER SERIOUS COMORBIDITY PRESENT: ICD-10-CM

## 2018-08-22 DIAGNOSIS — E78.5 DYSLIPIDEMIA: ICD-10-CM

## 2018-08-22 DIAGNOSIS — Z23 NEED FOR SHINGLES VACCINE: ICD-10-CM

## 2018-08-22 DIAGNOSIS — E11.42 DIABETIC POLYNEUROPATHY ASSOCIATED WITH TYPE 2 DIABETES MELLITUS (HCC): ICD-10-CM

## 2018-08-22 DIAGNOSIS — Z12.31 SCREENING MAMMOGRAM, ENCOUNTER FOR: ICD-10-CM

## 2018-08-22 DIAGNOSIS — D69.6 THROMBOCYTOPENIA (HCC): Primary | ICD-10-CM

## 2018-08-22 DIAGNOSIS — F32.A DEPRESSION, UNSPECIFIED DEPRESSION TYPE: ICD-10-CM

## 2018-08-22 DIAGNOSIS — I10 ESSENTIAL HYPERTENSION: ICD-10-CM

## 2018-08-22 DIAGNOSIS — Z12.11 SCREEN FOR COLON CANCER: ICD-10-CM

## 2018-08-22 DIAGNOSIS — E11.9 TYPE 2 DIABETES MELLITUS WITHOUT COMPLICATION, WITHOUT LONG-TERM CURRENT USE OF INSULIN (HCC): ICD-10-CM

## 2018-08-22 PROBLEM — R00.2 HEART PALPITATIONS: Status: RESOLVED | Noted: 2018-03-28 | Resolved: 2018-08-22

## 2018-08-22 PROCEDURE — 3045F PR MOST RECENT HEMOGLOBIN A1C LEVEL 7.0-9.0%: CPT | Performed by: FAMILY MEDICINE

## 2018-08-22 PROCEDURE — 1036F TOBACCO NON-USER: CPT | Performed by: FAMILY MEDICINE

## 2018-08-22 PROCEDURE — 3017F COLORECTAL CA SCREEN DOC REV: CPT | Performed by: FAMILY MEDICINE

## 2018-08-22 PROCEDURE — G8417 CALC BMI ABV UP PARAM F/U: HCPCS | Performed by: FAMILY MEDICINE

## 2018-08-22 PROCEDURE — 99214 OFFICE O/P EST MOD 30 MIN: CPT | Performed by: FAMILY MEDICINE

## 2018-08-22 PROCEDURE — 2022F DILAT RTA XM EVC RTNOPTHY: CPT | Performed by: FAMILY MEDICINE

## 2018-08-22 PROCEDURE — G8427 DOCREV CUR MEDS BY ELIG CLIN: HCPCS | Performed by: FAMILY MEDICINE

## 2018-08-22 RX ORDER — GABAPENTIN 300 MG/1
300 CAPSULE ORAL NIGHTLY
Qty: 90 CAPSULE | Refills: 0 | Status: SHIPPED | OUTPATIENT
Start: 2018-08-22 | End: 2019-01-21

## 2018-08-22 RX ORDER — GLIMEPIRIDE 4 MG/1
4 TABLET ORAL EVERY MORNING
Qty: 90 TABLET | Refills: 0 | Status: SHIPPED | OUTPATIENT
Start: 2018-08-22 | End: 2018-08-30 | Stop reason: ALTCHOICE

## 2018-08-22 RX ORDER — ALOGLIPTIN 25 MG/1
25 TABLET, FILM COATED ORAL DAILY
Qty: 30 TABLET | Refills: 2 | Status: SHIPPED | OUTPATIENT
Start: 2018-08-22 | End: 2018-08-30 | Stop reason: CLARIF

## 2018-08-22 RX ORDER — ATORVASTATIN CALCIUM 10 MG/1
10 TABLET, FILM COATED ORAL DAILY
Qty: 30 TABLET | Refills: 3 | Status: SHIPPED | OUTPATIENT
Start: 2018-08-22 | End: 2019-04-24 | Stop reason: SDUPTHER

## 2018-08-22 ASSESSMENT — PATIENT HEALTH QUESTIONNAIRE - PHQ9
SUM OF ALL RESPONSES TO PHQ QUESTIONS 1-9: 0
SUM OF ALL RESPONSES TO PHQ QUESTIONS 1-9: 0
1. LITTLE INTEREST OR PLEASURE IN DOING THINGS: 0
2. FEELING DOWN, DEPRESSED OR HOPELESS: 0
SUM OF ALL RESPONSES TO PHQ9 QUESTIONS 1 & 2: 0

## 2018-08-22 ASSESSMENT — ENCOUNTER SYMPTOMS
SHORTNESS OF BREATH: 0
NAUSEA: 0
VOICE CHANGE: 0
CONSTIPATION: 0
EYE REDNESS: 0
DIARRHEA: 0
ANAL BLEEDING: 0
ABDOMINAL PAIN: 0
BACK PAIN: 0
RECTAL PAIN: 0
EYE DISCHARGE: 0
COUGH: 0
CHEST TIGHTNESS: 0
TROUBLE SWALLOWING: 0
SINUS PRESSURE: 0
BLOOD IN STOOL: 0
ABDOMINAL DISTENTION: 0
VOMITING: 0
EYE PAIN: 0
COLOR CHANGE: 0

## 2018-08-22 NOTE — PROGRESS NOTES
Subjective:      Patient ID: Adan Bonner is a 60 y.o. female. HPI  had physical at [facility] for hiring and was diag with thrombocytopenia. She also had uncontrolled BS when she last checked labs in April but has not followed up since.  has been busy with diff things and had not been able to follow up on her medical problems. Mood low, sleep is poor, appetite is low . [facility] things are stressful mari because she has been unemployed for a while. Lipitor was started after last lab , never rechecked. [facility] Cardiac work up was negative. Last eye exam- told she had no more glaucoma and eye drops were discontinued. Review of Systems   Constitutional: Positive for appetite change. Negative for activity change and fatigue. HENT: Negative for dental problem, ear pain, hearing loss, postnasal drip, sinus pressure, sneezing, tinnitus, trouble swallowing and voice change. Eyes: Negative for pain, discharge, redness and visual disturbance. Respiratory: Negative for cough, chest tightness and shortness of breath. Cardiovascular: Negative for chest pain, palpitations and leg swelling. Gastrointestinal: Negative for abdominal distention, abdominal pain, anal bleeding, blood in stool, constipation, diarrhea, nausea, rectal pain and vomiting. Endocrine: Negative for cold intolerance, heat intolerance, polydipsia, polyphagia and polyuria. Genitourinary: Negative for decreased urine volume, difficulty urinating, dyspareunia, dysuria, enuresis, flank pain, frequency, genital sores, hematuria, menstrual problem, pelvic pain, urgency, vaginal bleeding and vaginal discharge. Musculoskeletal: Negative for arthralgias, back pain, gait problem, joint swelling, myalgias, neck pain and neck stiffness. Skin: Negative for color change, pallor and rash. Allergic/Immunologic: Negative for environmental allergies, food allergies and immunocompromised state.    Neurological: Negative for dizziness, tremors, seizures, syncope, facial asymmetry, speech difficulty, weakness, light-headedness, numbness and headaches. Hematological: Negative for adenopathy. Does not bruise/bleed easily. Psychiatric/Behavioral: Positive for sleep disturbance. Negative for agitation, behavioral problems, confusion, decreased concentration and suicidal ideas. The patient is not nervous/anxious. Objective:   Physical Exam   Constitutional: She is oriented to person, place, and time. She appears well-developed and well-nourished. HENT:   Head: Normocephalic and atraumatic. Right Ear: External ear normal.   Left Ear: External ear normal.   Nose: Nose normal.   Mouth/Throat: Oropharynx is clear and moist. No oropharyngeal exudate. Eyes: Pupils are equal, round, and reactive to light. Conjunctivae and EOM are normal. Right eye exhibits no discharge. Left eye exhibits no discharge. No scleral icterus. Neck: Normal range of motion. Neck supple. No JVD present. No tracheal deviation present. No thyromegaly present. Cardiovascular: Normal rate, regular rhythm, normal heart sounds and intact distal pulses. Exam reveals no gallop and no friction rub. No murmur heard. Pulmonary/Chest: Effort normal and breath sounds normal. No respiratory distress. She has no wheezes. She has no rales. She exhibits no tenderness. Abdominal: Soft. Bowel sounds are normal. She exhibits no distension and no mass. There is no tenderness. There is no rebound and no guarding. Musculoskeletal: Normal range of motion. She exhibits no edema or tenderness. Lymphadenopathy:     She has no cervical adenopathy. Neurological: She is alert and oriented to person, place, and time. She has normal reflexes. No cranial nerve deficit. Coordination normal.   Skin: Skin is warm and dry. No rash noted. Psychiatric: She has a normal mood and affect.      Visual inspection:  Deformity/amputation: absent  Skin lesions/pre-ulcerative calluses: absent  Edema: right- negative, left- negative    Sensory exam:  Monofilament sensation: normal  (minimum of 5 random plantar locations tested, avoiding callused areas - > 1 area with absence of sensation is + for neuropathy)    Plus at least one of the following:  Pulses: normal,   Pinprick: Intact  Proprioception: Intact  Vibration (128 Hz): Intact  Assessment:       Diagnosis Orders   1. Thrombocytopenia (HCC)  CBC With Auto Differential   2. Type 2 diabetes mellitus without complication, without long-term current use of insulin (HCC)  metFORMIN (GLUCOPHAGE) 1000 MG tablet    alogliptin (NESINA) 25 MG TABS tablet    Hemoglobin A1C    Microalbumin / Creatinine Urine Ratio    Microalbumin, Ur   3. Obesity, unspecified classification, unspecified obesity type, unspecified whether serious comorbidity present  glimepiride (AMARYL) 4 MG tablet   4. Diabetic polyneuropathy associated with type 2 diabetes mellitus (HCC)  gabapentin (NEURONTIN) 300 MG capsule   5. Dyslipidemia  atorvastatin (LIPITOR) 10 MG tablet    Lipid Panel    Comprehensive Metabolic Panel   6. Essential hypertension     7. Depression, unspecified depression type     8. History of TIA (transient ischemic attack)     9. Neuropathy     10. Screen for colon cancer  POCT Fecal Immunochemical Test (FIT)   11.  Need for shingles vaccine  zoster recombinant adjuvanted vaccine (SHINGRIX) 50 MCG SUSR injection         Plan:      Orders Placed This Encounter   Procedures    Hemoglobin A1C    Microalbumin / Creatinine Urine Ratio    Microalbumin, Ur    Lipid Panel    Comprehensive Metabolic Panel    CBC With Auto Differential    POCT Fecal Immunochemical Test (FIT)       Outpatient Encounter Prescriptions as of 8/22/2018   Medication Sig Dispense Refill    metFORMIN (GLUCOPHAGE) 1000 MG tablet Take 1 tablet by mouth 2 times daily (with meals) 60 tablet 3    glimepiride (AMARYL) 4 MG tablet Take 1 tablet by mouth every morning 90 tablet 0    gabapentin (NEURONTIN) 300 MG capsule Take 1 capsule by mouth nightly for 31 days. . 90 capsule 0    alogliptin (NESINA) 25 MG TABS tablet Take 1 tablet by mouth daily 30 tablet 2    atorvastatin (LIPITOR) 10 MG tablet Take 1 tablet by mouth daily 30 tablet 3    zoster recombinant adjuvanted vaccine (SHINGRIX) 50 MCG SUSR injection Inject 0.5 mLs into the muscle once for 1 dose 0.5 mL 0    aspirin 81 MG tablet Take 81 mg by mouth daily.  [DISCONTINUED] metFORMIN (GLUCOPHAGE) 1000 MG tablet Take 1 tablet by mouth 2 times daily (with meals) 60 tablet 3    [DISCONTINUED] alogliptin (NESINA) 25 MG TABS tablet Take 1 tablet by mouth daily 30 tablet 2    [DISCONTINUED] gabapentin (NEURONTIN) 300 MG capsule Take 1 capsule by mouth nightly for 31 days. 90 capsule 0    [DISCONTINUED] atorvastatin (LIPITOR) 10 MG tablet Take 1 tablet by mouth daily 30 tablet 3    [DISCONTINUED] glimepiride (AMARYL) 4 MG tablet Take 1 tablet by mouth every morning 90 tablet 0    [DISCONTINUED] mometasone-formoterol (DULERA) 100-5 MCG/ACT inhaler Inhale 2 puffs into the lungs 2 times daily Lot # [id number] exp 06/17/18 1 box given 1 puff 0     No facility-administered encounter medications on file as of 8/22/2018.             Otilio Aleman MD

## 2018-08-25 ENCOUNTER — HOSPITAL ENCOUNTER (OUTPATIENT)
Age: 58
Discharge: HOME | End: 2018-08-25
Payer: MEDICAID

## 2018-08-25 DIAGNOSIS — E11.9 TYPE 2 DIABETES MELLITUS WITHOUT COMPLICATION, WITHOUT LONG-TERM CURRENT USE OF INSULIN (HCC): ICD-10-CM

## 2018-08-25 DIAGNOSIS — D69.6 THROMBOCYTOPENIA (HCC): ICD-10-CM

## 2018-08-25 DIAGNOSIS — E78.5 DYSLIPIDEMIA: ICD-10-CM

## 2018-08-25 LAB
ABSOLUTE EOS #: 0.1 K/UL (ref 0–0.4)
ABSOLUTE IMMATURE GRANULOCYTE: ABNORMAL K/UL (ref 0–0.3)
ABSOLUTE LYMPH #: 3 K/UL (ref 1–4.8)
ABSOLUTE MONO #: 0.4 K/UL (ref 0.2–0.8)
ALBUMIN SERPL-MCNC: 4.8 G/DL (ref 3.5–5.2)
ALBUMIN/GLOBULIN RATIO: ABNORMAL (ref 1–2.5)
ALP BLD-CCNC: 112 U/L (ref 35–104)
ALT SERPL-CCNC: 22 U/L (ref 5–33)
ANION GAP SERPL CALCULATED.3IONS-SCNC: 10 MMOL/L (ref 9–17)
AST SERPL-CCNC: 20 U/L
BASOPHILS # BLD: 1 % (ref 0–2)
BASOPHILS ABSOLUTE: 0 K/UL (ref 0–0.2)
BILIRUB SERPL-MCNC: 0.31 MG/DL (ref 0.3–1.2)
BUN BLDV-MCNC: 15 MG/DL (ref 6–20)
BUN/CREAT BLD: 21 (ref 9–20)
CALCIUM SERPL-MCNC: 10 MG/DL (ref 8.6–10.4)
CHLORIDE BLD-SCNC: 99 MMOL/L (ref 98–107)
CHOLESTEROL/HDL RATIO: 2.6
CHOLESTEROL: 136 MG/DL
CO2: 30 MMOL/L (ref 20–31)
CREAT SERPL-MCNC: 0.71 MG/DL (ref 0.5–0.9)
CREATININE URINE: 212 MG/DL (ref 28–217)
DIFFERENTIAL TYPE: ABNORMAL
EOSINOPHILS RELATIVE PERCENT: 1 % (ref 1–4)
GFR AFRICAN AMERICAN: >60 ML/MIN
GFR NON-AFRICAN AMERICAN: >60 ML/MIN
GFR SERPL CREATININE-BSD FRML MDRD: ABNORMAL ML/MIN/{1.73_M2}
GFR SERPL CREATININE-BSD FRML MDRD: ABNORMAL ML/MIN/{1.73_M2}
GLUCOSE BLD-MCNC: 153 MG/DL (ref 70–99)
HCT VFR BLD CALC: 40.4 % (ref 36–46)
HDLC SERPL-MCNC: 53 MG/DL
HEMOGLOBIN: 13 G/DL (ref 12–16)
IMMATURE GRANULOCYTES: ABNORMAL %
LDL CHOLESTEROL: 70 MG/DL (ref 0–130)
LYMPHOCYTES # BLD: 48 % (ref 24–44)
MCH RBC QN AUTO: 28.4 PG (ref 26–34)
MCHC RBC AUTO-ENTMCNC: 32.2 G/DL (ref 31–37)
MCV RBC AUTO: 88.2 FL (ref 80–100)
MICROALBUMIN/CREAT 24H UR: <12 MG/L
MICROALBUMIN/CREAT UR-RTO: NORMAL MCG/MG CREAT
MONOCYTES # BLD: 7 % (ref 1–7)
NRBC AUTOMATED: ABNORMAL PER 100 WBC
PDW BLD-RTO: 16.6 % (ref 11.5–14.5)
PLATELET # BLD: 235 K/UL (ref 130–400)
PLATELET ESTIMATE: ABNORMAL
PMV BLD AUTO: 10.3 FL (ref 6–12)
POTASSIUM SERPL-SCNC: 3.8 MMOL/L (ref 3.7–5.3)
RBC # BLD: 4.58 M/UL (ref 4–5.2)
RBC # BLD: ABNORMAL 10*6/UL
SEG NEUTROPHILS: 43 % (ref 36–66)
SEGMENTED NEUTROPHILS ABSOLUTE COUNT: 2.7 K/UL (ref 1.8–7.7)
SODIUM BLD-SCNC: 139 MMOL/L (ref 135–144)
TOTAL PROTEIN: 7.7 G/DL (ref 6.4–8.3)
TRIGL SERPL-MCNC: 64 MG/DL
VLDLC SERPL CALC-MCNC: NORMAL MG/DL (ref 1–30)
WBC # BLD: 6.3 K/UL (ref 3.5–11)
WBC # BLD: ABNORMAL 10*3/UL

## 2018-08-25 PROCEDURE — 85025 COMPLETE CBC W/AUTO DIFF WBC: CPT

## 2018-08-25 PROCEDURE — 83036 HEMOGLOBIN GLYCOSYLATED A1C: CPT

## 2018-08-25 PROCEDURE — 82570 ASSAY OF URINE CREATININE: CPT

## 2018-08-25 PROCEDURE — 80061 LIPID PANEL: CPT

## 2018-08-25 PROCEDURE — 36415 COLL VENOUS BLD VENIPUNCTURE: CPT

## 2018-08-25 PROCEDURE — 80053 COMPREHEN METABOLIC PANEL: CPT

## 2018-08-25 PROCEDURE — 82043 UR ALBUMIN QUANTITATIVE: CPT

## 2018-08-26 LAB
ESTIMATED AVERAGE GLUCOSE: 232 MG/DL
HBA1C MFR BLD: 9.7 % (ref 4–6)

## 2018-08-30 ENCOUNTER — OFFICE VISIT (OUTPATIENT)
Dept: FAMILY MEDICINE CLINIC | Age: 58
End: 2018-08-30
Payer: MEDICAID

## 2018-08-30 VITALS
HEART RATE: 75 BPM | SYSTOLIC BLOOD PRESSURE: 127 MMHG | WEIGHT: 189.4 LBS | BODY MASS INDEX: 33.56 KG/M2 | HEIGHT: 63 IN | OXYGEN SATURATION: 98 % | DIASTOLIC BLOOD PRESSURE: 82 MMHG

## 2018-08-30 DIAGNOSIS — Z12.11 SCREEN FOR COLON CANCER: ICD-10-CM

## 2018-08-30 DIAGNOSIS — E78.5 DYSLIPIDEMIA: ICD-10-CM

## 2018-08-30 DIAGNOSIS — E11.8 TYPE 2 DIABETES MELLITUS WITH COMPLICATION, WITH LONG-TERM CURRENT USE OF INSULIN (HCC): Primary | ICD-10-CM

## 2018-08-30 DIAGNOSIS — E66.9 OBESITY, UNSPECIFIED CLASSIFICATION, UNSPECIFIED OBESITY TYPE, UNSPECIFIED WHETHER SERIOUS COMORBIDITY PRESENT: ICD-10-CM

## 2018-08-30 DIAGNOSIS — Z79.4 TYPE 2 DIABETES MELLITUS WITH COMPLICATION, WITH LONG-TERM CURRENT USE OF INSULIN (HCC): Primary | ICD-10-CM

## 2018-08-30 DIAGNOSIS — I10 ESSENTIAL HYPERTENSION: ICD-10-CM

## 2018-08-30 LAB
CONTROL: NORMAL
HEMOCCULT STL QL: NORMAL

## 2018-08-30 PROCEDURE — 99214 OFFICE O/P EST MOD 30 MIN: CPT | Performed by: FAMILY MEDICINE

## 2018-08-30 PROCEDURE — G8417 CALC BMI ABV UP PARAM F/U: HCPCS | Performed by: FAMILY MEDICINE

## 2018-08-30 PROCEDURE — 3046F HEMOGLOBIN A1C LEVEL >9.0%: CPT | Performed by: FAMILY MEDICINE

## 2018-08-30 PROCEDURE — 82274 ASSAY TEST FOR BLOOD FECAL: CPT | Performed by: FAMILY MEDICINE

## 2018-08-30 PROCEDURE — 1036F TOBACCO NON-USER: CPT | Performed by: FAMILY MEDICINE

## 2018-08-30 PROCEDURE — 3017F COLORECTAL CA SCREEN DOC REV: CPT | Performed by: FAMILY MEDICINE

## 2018-08-30 PROCEDURE — G8427 DOCREV CUR MEDS BY ELIG CLIN: HCPCS | Performed by: FAMILY MEDICINE

## 2018-08-30 PROCEDURE — 2022F DILAT RTA XM EVC RTNOPTHY: CPT | Performed by: FAMILY MEDICINE

## 2018-08-30 RX ORDER — LOSARTAN POTASSIUM 25 MG/1
25 TABLET ORAL DAILY
Qty: 90 TABLET | Refills: 0 | Status: SHIPPED | OUTPATIENT
Start: 2018-08-30 | End: 2018-11-23 | Stop reason: SDUPTHER

## 2018-08-30 RX ORDER — GLUCOSAMINE HCL/CHONDROITIN SU 500-400 MG
CAPSULE ORAL
Qty: 100 STRIP | Refills: 3 | Status: SHIPPED | OUTPATIENT
Start: 2018-08-30 | End: 2018-09-26 | Stop reason: SDUPTHER

## 2018-08-30 ASSESSMENT — ENCOUNTER SYMPTOMS
SINUS PRESSURE: 0
SHORTNESS OF BREATH: 0
COUGH: 0
RECTAL PAIN: 0
ANAL BLEEDING: 0
CHEST TIGHTNESS: 0
CONSTIPATION: 1
VOMITING: 0
VOICE CHANGE: 0
EYE REDNESS: 0
ABDOMINAL PAIN: 0
NAUSEA: 0
BACK PAIN: 0
COLOR CHANGE: 0
ABDOMINAL DISTENTION: 0
DIARRHEA: 0
EYE PAIN: 0
BLOOD IN STOOL: 0
TROUBLE SWALLOWING: 0
EYE DISCHARGE: 0

## 2018-08-30 ASSESSMENT — PATIENT HEALTH QUESTIONNAIRE - PHQ9
SUM OF ALL RESPONSES TO PHQ QUESTIONS 1-9: 0
SUM OF ALL RESPONSES TO PHQ9 QUESTIONS 1 & 2: 0
2. FEELING DOWN, DEPRESSED OR HOPELESS: 0
SUM OF ALL RESPONSES TO PHQ QUESTIONS 1-9: 0
1. LITTLE INTEREST OR PLEASURE IN DOING THINGS: 0

## 2018-08-30 NOTE — PATIENT INSTRUCTIONS
Patient Education        Learning About Diabetes Food Guidelines  Your Care Instructions    Meal planning is important to manage diabetes. It helps keep your blood sugar at a target level (which you set with your doctor). You don't have to eat special foods. You can eat what your family eats, including sweets once in a while. But you do have to pay attention to how often you eat and how much you eat of certain foods. You may want to work with a dietitian or a certified diabetes educator (CDE) to help you plan meals and snacks. A dietitian or CDE can also help you lose weight if that is one of your goals. What should you know about eating carbs? Managing the amount of carbohydrate (carbs) you eat is an important part of healthy meals when you have diabetes. Carbohydrate is found in many foods. · Learn which foods have carbs. And learn the amounts of carbs in different foods. ¨ Bread, cereal, pasta, and rice have about 15 grams of carbs in a serving. A serving is 1 slice of bread (1 ounce), ½ cup of cooked cereal, or 1/3 cup of cooked pasta or rice. ¨ Fruits have 15 grams of carbs in a serving. A serving is 1 small fresh fruit, such as an apple or orange; ½ of a banana; ½ cup of cooked or canned fruit; ½ cup of fruit juice; 1 cup of melon or raspberries; or 2 tablespoons of dried fruit. ¨ Milk and no-sugar-added yogurt have 15 grams of carbs in a serving. A serving is 1 cup of milk or 2/3 cup of no-sugar-added yogurt. ¨ Starchy vegetables have 15 grams of carbs in a serving. A serving is ½ cup of mashed potatoes or sweet potato; 1 cup winter squash; ½ of a small baked potato; ½ cup of cooked beans; or ½ cup cooked corn or green peas. · Learn how much carbs to eat each day and at each meal. A dietitian or CDE can teach you how to keep track of the amount of carbs you eat. This is called carbohydrate counting. · If you are not sure how to count carbohydrate grams, use the Plate Method to plan meals.  It is a when cooking. · Don't skip meals. Your blood sugar may drop too low if you skip meals and take insulin or certain medicines for diabetes. · Check with your doctor before you drink alcohol. Alcohol can cause your blood sugar to drop too low. Alcohol can also cause a bad reaction if you take certain diabetes medicines. Follow-up care is a key part of your treatment and safety. Be sure to make and go to all appointments, and call your doctor if you are having problems. It's also a good idea to know your test results and keep a list of the medicines you take. Where can you learn more? Go to [email or web address] org and sign in to your [email or web address] account. Enter C153 in the Exavio box to learn more about \"Learning About Diabetes Food Guidelines. \"     If you do not have an account, please click on the \"Sign Up Now\" link. Current as of: December 7, 2017  Content Version: 11.7  © 3310-7624 [employer]. Care instructions adapted under license by [facility] ([facility] [place]). If you have questions about a medical condition or this instruction, always ask your healthcare professional. [place] [address] any warranty or liability for your use of this information.

## 2018-09-26 DIAGNOSIS — Z79.4 TYPE 2 DIABETES MELLITUS WITH COMPLICATION, WITH LONG-TERM CURRENT USE OF INSULIN (HCC): ICD-10-CM

## 2018-09-26 DIAGNOSIS — E11.8 TYPE 2 DIABETES MELLITUS WITH COMPLICATION, WITH LONG-TERM CURRENT USE OF INSULIN (HCC): ICD-10-CM

## 2018-09-26 RX ORDER — GLUCOSAMINE HCL/CHONDROITIN SU 500-400 MG
CAPSULE ORAL
Qty: 100 STRIP | Refills: 3 | Status: SHIPPED | OUTPATIENT
Start: 2018-09-26 | End: 2019-10-04 | Stop reason: SDUPTHER

## 2018-09-26 RX ORDER — BLOOD-GLUCOSE METER
EACH MISCELLANEOUS
Qty: 1 KIT | Refills: 0 | Status: SHIPPED | OUTPATIENT
Start: 2018-09-26

## 2018-09-26 NOTE — TELEPHONE ENCOUNTER
Adan Bonner is calling to request a refill on the following medication(s):  Requested Prescriptions     Pending Prescriptions Disp Refills    Blood Glucose Monitoring Suppl (ONE TOUCH ULTRA 2) w/Device KIT [Pharmacy Med Name: Padmini Scot GLUCOSE SYST] 1 kit 0     Sig: use as directed DUE TO PATIENTS INSURANCE, WE NEED TO GET A NEW METER FOR ONE TOUCH, DUE TO INS.  BARRIENTOS       Last Visit Date (If Applicable):  5/84/0645    Next Visit Date:    9/28/2018

## 2018-11-22 ENCOUNTER — HOSPITAL ENCOUNTER (EMERGENCY)
Age: 58
Discharge: HOME | End: 2018-11-22
Attending: EMERGENCY MEDICINE
Payer: MEDICAID

## 2018-11-22 ENCOUNTER — APPOINTMENT (OUTPATIENT)
Dept: GENERAL RADIOLOGY | Age: 58
End: 2018-11-22
Payer: MEDICAID

## 2018-11-22 ENCOUNTER — APPOINTMENT (OUTPATIENT)
Dept: CT IMAGING | Age: 58
End: 2018-11-22
Payer: MEDICAID

## 2018-11-22 VITALS
HEART RATE: 74 BPM | DIASTOLIC BLOOD PRESSURE: 72 MMHG | OXYGEN SATURATION: 100 % | HEIGHT: 63 IN | SYSTOLIC BLOOD PRESSURE: 142 MMHG | TEMPERATURE: 98.1 F | RESPIRATION RATE: 12 BRPM | WEIGHT: 181.9 LBS | BODY MASS INDEX: 32.23 KG/M2

## 2018-11-22 DIAGNOSIS — M79.675 GREAT TOE PAIN, LEFT: ICD-10-CM

## 2018-11-22 DIAGNOSIS — R51.9 NONINTRACTABLE EPISODIC HEADACHE, UNSPECIFIED HEADACHE TYPE: Primary | ICD-10-CM

## 2018-11-22 DIAGNOSIS — M54.2 NECK PAIN: ICD-10-CM

## 2018-11-22 LAB
ABSOLUTE EOS #: 0.12 K/UL (ref 0–0.4)
ABSOLUTE IMMATURE GRANULOCYTE: ABNORMAL K/UL (ref 0–0.3)
ABSOLUTE LYMPH #: 3.1 K/UL (ref 1–4.8)
ABSOLUTE MONO #: 0.25 K/UL (ref 0.2–0.8)
BASOPHILS # BLD: 1 %
BASOPHILS ABSOLUTE: 0.06 K/UL (ref 0–0.2)
CHP ED QC CHECK: NORMAL
DIFFERENTIAL TYPE: ABNORMAL
EOSINOPHILS RELATIVE PERCENT: 2 % (ref 1–4)
GLUCOSE BLD-MCNC: 83 MG/DL
GLUCOSE BLD-MCNC: 83 MG/DL (ref 65–105)
HCT VFR BLD CALC: 37.9 % (ref 36–46)
HEMOGLOBIN: 12.4 G/DL (ref 12–16)
IMMATURE GRANULOCYTES: ABNORMAL %
LYMPHOCYTES # BLD: 50 % (ref 24–44)
MCH RBC QN AUTO: 28.9 PG (ref 26–34)
MCHC RBC AUTO-ENTMCNC: 32.7 G/DL (ref 31–37)
MCV RBC AUTO: 88.3 FL (ref 80–100)
MONOCYTES # BLD: 4 % (ref 1–7)
NRBC AUTOMATED: ABNORMAL PER 100 WBC
PDW BLD-RTO: 16.5 % (ref 11.5–14.5)
PLATELET # BLD: 262 K/UL (ref 130–400)
PLATELET ESTIMATE: ABNORMAL
PMV BLD AUTO: 9.4 FL (ref 6–12)
RBC # BLD: 4.29 M/UL (ref 4–5.2)
RBC # BLD: ABNORMAL 10*6/UL
SEG NEUTROPHILS: 43 % (ref 36–66)
SEGMENTED NEUTROPHILS ABSOLUTE COUNT: 2.67 K/UL (ref 1.8–7.7)
WBC # BLD: 6.2 K/UL (ref 3.5–11)
WBC # BLD: ABNORMAL 10*3/UL

## 2018-11-22 PROCEDURE — 82947 ASSAY GLUCOSE BLOOD QUANT: CPT

## 2018-11-22 PROCEDURE — 6360000002 HC RX W HCPCS: Performed by: NURSE PRACTITIONER

## 2018-11-22 PROCEDURE — 70450 CT HEAD/BRAIN W/O DYE: CPT

## 2018-11-22 PROCEDURE — 73630 X-RAY EXAM OF FOOT: CPT

## 2018-11-22 PROCEDURE — 85025 COMPLETE CBC W/AUTO DIFF WBC: CPT

## 2018-11-22 PROCEDURE — 99284 EMERGENCY DEPT VISIT MOD MDM: CPT

## 2018-11-22 PROCEDURE — 96372 THER/PROPH/DIAG INJ SC/IM: CPT

## 2018-11-22 RX ORDER — BUTALBITAL, ACETAMINOPHEN AND CAFFEINE 300; 40; 50 MG/1; MG/1; MG/1
1 CAPSULE ORAL EVERY 6 HOURS PRN
Qty: 12 CAPSULE | Refills: 0 | Status: SHIPPED | OUTPATIENT
Start: 2018-11-22 | End: 2019-03-26

## 2018-11-22 RX ORDER — POLYETHYLENE GLYCOL 3350 17 G/17G
17 POWDER, FOR SOLUTION ORAL 2 TIMES DAILY
Qty: 8 EACH | Refills: 0 | Status: SHIPPED | OUTPATIENT
Start: 2018-11-22 | End: 2018-11-22

## 2018-11-22 RX ORDER — ONDANSETRON 4 MG/1
4 TABLET, ORALLY DISINTEGRATING ORAL EVERY 8 HOURS PRN
Qty: 12 TABLET | Refills: 0 | Status: SHIPPED | OUTPATIENT
Start: 2018-11-22 | End: 2019-01-21

## 2018-11-22 RX ORDER — KETOROLAC TROMETHAMINE 30 MG/ML
30 INJECTION, SOLUTION INTRAMUSCULAR; INTRAVENOUS ONCE
Status: COMPLETED | OUTPATIENT
Start: 2018-11-22 | End: 2018-11-22

## 2018-11-22 RX ORDER — ONDANSETRON 4 MG/1
4 TABLET, ORALLY DISINTEGRATING ORAL ONCE
Status: COMPLETED | OUTPATIENT
Start: 2018-11-22 | End: 2018-11-22

## 2018-11-22 RX ORDER — IBUPROFEN 800 MG/1
800 TABLET ORAL EVERY 8 HOURS PRN
Qty: 20 TABLET | Refills: 0 | Status: SHIPPED | OUTPATIENT
Start: 2018-11-22 | End: 2018-12-03 | Stop reason: CLARIF

## 2018-11-22 RX ADMIN — KETOROLAC TROMETHAMINE 30 MG: 30 INJECTION, SOLUTION INTRAMUSCULAR at 12:12

## 2018-11-22 RX ADMIN — ONDANSETRON 4 MG: 4 TABLET, ORALLY DISINTEGRATING ORAL at 10:54

## 2018-11-22 ASSESSMENT — ENCOUNTER SYMPTOMS
SHORTNESS OF BREATH: 0
COLOR CHANGE: 1
ABDOMINAL PAIN: 0
DIARRHEA: 0
VOMITING: 0
NAUSEA: 1

## 2018-11-22 ASSESSMENT — PAIN SCALES - GENERAL
PAINLEVEL_OUTOF10: 8
PAINLEVEL_OUTOF10: 8

## 2018-11-22 ASSESSMENT — PAIN DESCRIPTION - PAIN TYPE: TYPE: ACUTE PAIN

## 2018-11-22 ASSESSMENT — PAIN DESCRIPTION - ONSET: ONSET: ON-GOING

## 2018-11-22 ASSESSMENT — PAIN DESCRIPTION - DESCRIPTORS: DESCRIPTORS: ACHING;SHARP

## 2018-11-22 ASSESSMENT — PAIN DESCRIPTION - PROGRESSION: CLINICAL_PROGRESSION: NOT CHANGED

## 2018-11-22 ASSESSMENT — PAIN DESCRIPTION - FREQUENCY: FREQUENCY: CONTINUOUS

## 2018-11-22 NOTE — ED NOTES
Re-evaluated by scotty fragoso. Patient being pre[pared for discharge.       Marleny Alcantar RN  11/22/18 [id number]

## 2018-11-22 NOTE — ED PROVIDER NOTES
hemorrhage is identified in the brain parenchyma. ORBITS:  The visualized paranasal sinuses and mastoid air cells are clear. Incidental note is made of dysconjugate gaze. SINUSES: The visualized paranasal sinuses and mastoid air cells demonstrate no acute abnormality. SOFT TISSUES/SKULL:  No acute abnormality of the visualized skull or soft tissues. No acute intracranial abnormality. Interpretation per the Radiologist below, if available at the time of this note:    CT HEAD WO CONTRAST   Preliminary Result   No acute intracranial abnormality. XR FOOT LEFT (MIN 3 VIEWS)   Final Result   No radiographic evidence for acute osseous abnormality. EDBEDSIDE ULTRASOUND:   Performed by Carolee Sheriff - none    LABS:  [unfilled]    All other labs were within normal range or not returned as of this dictation. EMERGENCY DEPARTMENT COURSE andDIFFERENTIAL DIAGNOSIS/MDM:   Patient was evaluated in conjunction with attending physician. She is nontoxic in appearance. She is afebrile. She exhibits full range of motion with her neck. There is no tenderness of the patient to the cervical spine. No swelling or rash. X-ray of the left foot. She shows no acute findings. No neurological deficits. She was given Toradol injection and Zofran in the ED. She'll be discharged Motrin, Zofran, and Fioricet. She was instructed to follow-up with her doctor. Return to ED if symptoms worsen. Vitals:    Vitals:    11/22/18 0932 11/22/18 1049   BP: (!) 150/77 (!) 142/72   Pulse: 80 74   Resp: 16 12   Temp: 98.1 °F (36.7 °C)    TempSrc: Oral    SpO2: 99% 100%   Weight: 181 lb 14.4 oz (82.5 kg)    Height: 5' 3\" (1.6 m)          CONSULTS:  None    PROCEDURES:  Procedures    FINAL IMPRESSION      1. Nonintractable episodic headache, unspecified headache type    2. Neck pain    3.  Great toe pain, left          DISPOSITION/PLAN   DISPOSITION Decision To Discharge 11/22/2018 11:52:28 AM      PATIENT REFERRED TO:   Ty Gomes MD  [address]  [phone]    Schedule an appointment as soon as possible for a visit       [facility] ED  [address]  [phone]    If symptoms worsen      DISCHARGE MEDICATIONS:     Discharge Medication List as of 11/22/2018 11:55 AM      START taking these medications    Details   butalbital-APAP-caffeine (FIORICET) -40 MG CAPS per capsule Take 1 capsule by mouth every 6 hours as needed for Headaches, Disp-12 capsule, R-0Print      ibuprofen (ADVIL;MOTRIN) 800 MG tablet Take 1 tablet by mouth every 8 hours as needed for Pain, Disp-20 tablet, R-0Print      ondansetron (ZOFRAN ODT) 4 MG disintegrating tablet Take 1 tablet by mouth every 8 hours as needed for Nausea or Vomiting, Disp-12 tablet, R-0Print           Electronically signed by FAY Edgar 11/22/2018 at 2:22 PM           FAY Edgar CNP  11/22/18 1422

## 2018-11-23 DIAGNOSIS — I10 ESSENTIAL HYPERTENSION: ICD-10-CM

## 2018-11-23 RX ORDER — LOSARTAN POTASSIUM 25 MG/1
TABLET ORAL
Qty: 90 TABLET | Refills: 0 | Status: SHIPPED | OUTPATIENT
Start: 2018-11-23 | End: 2019-01-24 | Stop reason: SDUPTHER

## 2018-12-03 ENCOUNTER — OFFICE VISIT (OUTPATIENT)
Dept: FAMILY MEDICINE CLINIC | Age: 58
End: 2018-12-03
Payer: MEDICAID

## 2018-12-03 VITALS
WEIGHT: 182.6 LBS | SYSTOLIC BLOOD PRESSURE: 134 MMHG | DIASTOLIC BLOOD PRESSURE: 82 MMHG | BODY MASS INDEX: 32.36 KG/M2 | HEART RATE: 85 BPM | OXYGEN SATURATION: 98 % | HEIGHT: 63 IN

## 2018-12-03 DIAGNOSIS — E08.22 DIABETES MELLITUS DUE TO UNDERLYING CONDITION WITH STAGE 1 CHRONIC KIDNEY DISEASE, WITHOUT LONG-TERM CURRENT USE OF INSULIN (HCC): ICD-10-CM

## 2018-12-03 DIAGNOSIS — N18.1 DIABETES MELLITUS DUE TO UNDERLYING CONDITION WITH STAGE 1 CHRONIC KIDNEY DISEASE, WITHOUT LONG-TERM CURRENT USE OF INSULIN (HCC): ICD-10-CM

## 2018-12-03 DIAGNOSIS — F51.01 PRIMARY INSOMNIA: ICD-10-CM

## 2018-12-03 DIAGNOSIS — B35.1 ONYCHOMYCOSIS: ICD-10-CM

## 2018-12-03 DIAGNOSIS — G62.9 NEUROPATHY: Primary | ICD-10-CM

## 2018-12-03 DIAGNOSIS — E78.5 DYSLIPIDEMIA: ICD-10-CM

## 2018-12-03 DIAGNOSIS — I10 ESSENTIAL HYPERTENSION: ICD-10-CM

## 2018-12-03 PROCEDURE — 99214 OFFICE O/P EST MOD 30 MIN: CPT | Performed by: FAMILY MEDICINE

## 2018-12-03 PROCEDURE — G8427 DOCREV CUR MEDS BY ELIG CLIN: HCPCS | Performed by: FAMILY MEDICINE

## 2018-12-03 PROCEDURE — 3017F COLORECTAL CA SCREEN DOC REV: CPT | Performed by: FAMILY MEDICINE

## 2018-12-03 PROCEDURE — G8484 FLU IMMUNIZE NO ADMIN: HCPCS | Performed by: FAMILY MEDICINE

## 2018-12-03 PROCEDURE — 1036F TOBACCO NON-USER: CPT | Performed by: FAMILY MEDICINE

## 2018-12-03 PROCEDURE — G8417 CALC BMI ABV UP PARAM F/U: HCPCS | Performed by: FAMILY MEDICINE

## 2018-12-03 RX ORDER — ALOGLIPTIN 25 MG/1
TABLET, FILM COATED ORAL
Refills: 0 | COMMUNITY
Start: 2018-11-30 | End: 2018-12-03 | Stop reason: ALTCHOICE

## 2018-12-03 RX ORDER — HYDROXYZINE HYDROCHLORIDE 10 MG/1
10 TABLET, FILM COATED ORAL NIGHTLY PRN
Qty: 30 TABLET | Refills: 1 | Status: SHIPPED | OUTPATIENT
Start: 2018-12-03 | End: 2018-12-13

## 2018-12-03 RX ORDER — GLIMEPIRIDE 4 MG/1
4 TABLET ORAL NIGHTLY
Refills: 0 | COMMUNITY
Start: 2018-11-30 | End: 2019-02-18 | Stop reason: SDUPTHER

## 2018-12-03 ASSESSMENT — ENCOUNTER SYMPTOMS
DIARRHEA: 0
SINUS PRESSURE: 0
SHORTNESS OF BREATH: 0
ANAL BLEEDING: 0
EYE DISCHARGE: 0
CHEST TIGHTNESS: 0
RECTAL PAIN: 0
NAUSEA: 0
COLOR CHANGE: 0
ABDOMINAL PAIN: 0
TROUBLE SWALLOWING: 0
EYE PAIN: 0
BACK PAIN: 0
CONSTIPATION: 0
VOMITING: 0
EYE REDNESS: 0
VOICE CHANGE: 0
ABDOMINAL DISTENTION: 0
COUGH: 0
BLOOD IN STOOL: 0

## 2018-12-03 NOTE — PROGRESS NOTES
is not nervous/anxious. Objective:   Physical Exam   Constitutional: She is oriented to person, place, and time. She appears well-developed and well-nourished. HENT:   Head: Normocephalic and atraumatic. Right Ear: External ear normal.   Left Ear: External ear normal.   Nose: Nose normal.   Mouth/Throat: Oropharynx is clear and moist. No oropharyngeal exudate. Eyes: Pupils are equal, round, and reactive to light. Conjunctivae and EOM are normal. Right eye exhibits no discharge. Left eye exhibits no discharge. No scleral icterus. Neck: Normal range of motion. Neck supple. No JVD present. No tracheal deviation present. No thyromegaly present. Cardiovascular: Normal rate, regular rhythm, normal heart sounds and intact distal pulses. Exam reveals no gallop and no friction rub. No murmur heard. Pulmonary/Chest: Effort normal and breath sounds normal. No respiratory distress. She has no wheezes. She has no rales. She exhibits no tenderness. Abdominal: Soft. Bowel sounds are normal. She exhibits no distension and no mass. There is no tenderness. There is no rebound and no guarding. Musculoskeletal: Normal range of motion. She exhibits no edema or tenderness. Lymphadenopathy:     She has no cervical adenopathy. Neurological: She is alert and oriented to person, place, and time. She has normal reflexes. No cranial nerve deficit. Coordination normal.   Skin: Skin is warm and dry. No rash noted. Psychiatric: She has a normal mood and affect. Assessment:       Diagnosis Orders   1. Neuropathy     2. Dyslipidemia     3. Essential hypertension     4. Diabetes mellitus due to underlying condition with stage 1 chronic kidney disease, without long-term current use of insulin ([facility])  Basic Metabolic Panel    Hemoglobin A1C    Microalbumin / Creatinine Urine Ratio    Microalbumin, Ur   5. Onychomycosis     6.  Primary insomnia  hydrOXYzine (ATARAX) 10 MG tablet         Plan:      Orders Placed This Encounter   Procedures    Basic Metabolic Panel    Hemoglobin A1C    Microalbumin / Creatinine Urine Ratio    Microalbumin, Ur       Outpatient Encounter Prescriptions as of 12/3/2018   Medication Sig Dispense Refill    glimepiride (AMARYL) 4 MG tablet   0    hydrOXYzine (ATARAX) 10 MG tablet Take 1 tablet by mouth nightly as needed for Anxiety (sleep) 30 tablet 1    losartan (COZAAR) 25 MG tablet take 1 tablet by mouth once daily 90 tablet 0    butalbital-APAP-caffeine (FIORICET) -40 MG CAPS per capsule Take 1 capsule by mouth every 6 hours as needed for Headaches 12 capsule 0    ondansetron (ZOFRAN ODT) 4 MG disintegrating tablet Take 1 tablet by mouth every 8 hours as needed for Nausea or Vomiting 12 tablet 0    blood glucose monitor strips Use to check BS  strip 3    Lancets 30G MISC 1 each by Does not apply route 2 times daily Use to check BS BID Dx E 11.9 100 each 3    Blood Glucose Monitoring Suppl (ONE TOUCH ULTRA 2) w/Device KIT use as directed DUE TO PATIENTS INSURANCE, WE NEED TO GET A NEW METER FOR ONE TOUCH, DUE TO INS. [employer] 1 kit 0    metFORMIN (GLUCOPHAGE) 1000 MG tablet Take 1 tablet by mouth daily 90 tablet 0    Dulaglutide (TRULICITY) 8.10 IK/8.4KS SOPN Inject 0.5 mLs into the skin every 7 days 4 pen 3    atorvastatin (LIPITOR) 10 MG tablet Take 1 tablet by mouth daily 30 tablet 3    aspirin 81 MG tablet Take 81 mg by mouth daily.       [DISCONTINUED] alogliptin (NESINA) 25 MG TABS tablet   0    [DISCONTINUED] ibuprofen (ADVIL;MOTRIN) 800 MG tablet Take 1 tablet by mouth every 8 hours as needed for Pain 20 tablet 0    [DISCONTINUED] Insulin Glargine-Lixisenatide 100-33 UNT-MCG/ML SOPN Inject 15 Units into the skin daily Please provide insulin needles 4 pen 1    [DISCONTINUED] Insulin Glargine-Lixisenatide (SOLIQUA) 100-33 UNT-MCG/ML SOPN Inject 1 Units into the skin Daily with lunch Lot #[id number] exp 12/31/18 1 pen 0    [DISCONTINUED] insulin glargine (TOUJEO SOLOSTAR)

## 2018-12-13 ENCOUNTER — HOSPITAL ENCOUNTER (OUTPATIENT)
Age: 58
Discharge: HOME | End: 2018-12-13
Payer: MEDICAID

## 2018-12-13 DIAGNOSIS — N18.1 DIABETES MELLITUS DUE TO UNDERLYING CONDITION WITH STAGE 1 CHRONIC KIDNEY DISEASE, WITHOUT LONG-TERM CURRENT USE OF INSULIN (HCC): ICD-10-CM

## 2018-12-13 DIAGNOSIS — E08.22 DIABETES MELLITUS DUE TO UNDERLYING CONDITION WITH STAGE 1 CHRONIC KIDNEY DISEASE, WITHOUT LONG-TERM CURRENT USE OF INSULIN (HCC): ICD-10-CM

## 2018-12-13 LAB
ANION GAP SERPL CALCULATED.3IONS-SCNC: 11 MMOL/L (ref 9–17)
BUN BLDV-MCNC: 16 MG/DL (ref 6–20)
BUN/CREAT BLD: NORMAL (ref 9–20)
CALCIUM SERPL-MCNC: 9.9 MG/DL (ref 8.6–10.4)
CHLORIDE BLD-SCNC: 103 MMOL/L (ref 98–107)
CO2: 28 MMOL/L (ref 20–31)
CREAT SERPL-MCNC: 0.7 MG/DL (ref 0.5–0.9)
CREATININE URINE: 212 MG/DL (ref 28–217)
ESTIMATED AVERAGE GLUCOSE: 146 MG/DL
GFR AFRICAN AMERICAN: >60 ML/MIN
GFR NON-AFRICAN AMERICAN: >60 ML/MIN
GFR SERPL CREATININE-BSD FRML MDRD: NORMAL ML/MIN/{1.73_M2}
GFR SERPL CREATININE-BSD FRML MDRD: NORMAL ML/MIN/{1.73_M2}
GLUCOSE BLD-MCNC: 80 MG/DL (ref 70–99)
HBA1C MFR BLD: 6.7 % (ref 4–6)
MICROALBUMIN/CREAT 24H UR: <12 MG/L
MICROALBUMIN/CREAT UR-RTO: NORMAL MCG/MG CREAT
POTASSIUM SERPL-SCNC: 4.4 MMOL/L (ref 3.7–5.3)
SODIUM BLD-SCNC: 142 MMOL/L (ref 135–144)

## 2018-12-13 PROCEDURE — 82570 ASSAY OF URINE CREATININE: CPT

## 2018-12-13 PROCEDURE — 83036 HEMOGLOBIN GLYCOSYLATED A1C: CPT

## 2018-12-13 PROCEDURE — 36415 COLL VENOUS BLD VENIPUNCTURE: CPT

## 2018-12-13 PROCEDURE — 82043 UR ALBUMIN QUANTITATIVE: CPT

## 2018-12-13 PROCEDURE — 80048 BASIC METABOLIC PNL TOTAL CA: CPT

## 2018-12-18 ENCOUNTER — OFFICE VISIT (OUTPATIENT)
Dept: PODIATRY | Age: 58
End: 2018-12-18
Payer: MEDICAID

## 2018-12-18 VITALS — WEIGHT: 181 LBS | BODY MASS INDEX: 32.07 KG/M2 | HEIGHT: 63 IN

## 2018-12-18 DIAGNOSIS — B35.1 DERMATOPHYTOSIS OF NAIL: Primary | ICD-10-CM

## 2018-12-18 DIAGNOSIS — M67.472 GANGLION CYST OF LEFT FOOT: ICD-10-CM

## 2018-12-18 DIAGNOSIS — B35.3 TINEA PEDIS, LEFT: ICD-10-CM

## 2018-12-18 DIAGNOSIS — E11.42 TYPE 2 DIABETES MELLITUS WITH DIABETIC POLYNEUROPATHY, WITHOUT LONG-TERM CURRENT USE OF INSULIN (HCC): ICD-10-CM

## 2018-12-18 PROCEDURE — 99213 OFFICE O/P EST LOW 20 MIN: CPT | Performed by: PODIATRIST

## 2018-12-18 PROCEDURE — 3017F COLORECTAL CA SCREEN DOC REV: CPT | Performed by: PODIATRIST

## 2018-12-18 PROCEDURE — 3044F HG A1C LEVEL LT 7.0%: CPT | Performed by: PODIATRIST

## 2018-12-18 PROCEDURE — G8484 FLU IMMUNIZE NO ADMIN: HCPCS | Performed by: PODIATRIST

## 2018-12-18 PROCEDURE — 1036F TOBACCO NON-USER: CPT | Performed by: PODIATRIST

## 2018-12-18 PROCEDURE — 2022F DILAT RTA XM EVC RTNOPTHY: CPT | Performed by: PODIATRIST

## 2018-12-18 PROCEDURE — G8427 DOCREV CUR MEDS BY ELIG CLIN: HCPCS | Performed by: PODIATRIST

## 2018-12-18 PROCEDURE — G8417 CALC BMI ABV UP PARAM F/U: HCPCS | Performed by: PODIATRIST

## 2018-12-18 ASSESSMENT — ENCOUNTER SYMPTOMS
COLOR CHANGE: 1
NAUSEA: 0
CONSTIPATION: 0
DIARRHEA: 0
VOMITING: 0

## 2018-12-18 NOTE — PROGRESS NOTES
digits, Bilateral.  Protective sensation decreased  10/10 sites via 5.07/10g Storden-Eran Monofilament, Bilateral.     Musculoskeletal: Muscle strength 5/5, Bilateral.  Pain and prominence dorsal left midfoot. Pain present upon palpation of arches, sinus tarsi region, and lateral akle, right. No pain left ankle. Radiographs: 3 views left Foot:  Degenerative changes noted at the metatarsal tarsal joint. Small dorsal osteophyte noted at the second metatarsal cuneiform joint. Asessment: Patient is a 60 y.o. female with:   1. Dermatophytosis of nail    2. Type 2 diabetes mellitus with diabetic polyneuropathy, without long-term current use of insulin (HCC)    3. Ganglion cyst of left foot    4. Tinea pedis, left        Plan: Pt was evaluated and examined. Patient was given personalized discharge instructions. Diagnosis was discussed with the pt and all of their questions were answered in detail. Proper foot hygiene and care was discussed with the pt. Informed patient on proper diabetic foot care and importance of tight glycemic control. Patient to check feet daily and contact the office with any questions/problems/concerns. Other comorbidity noted and will be managed by PCP. Diabetic foot examination performed this visit. The exam included neurological sensory exam, a 10-g monofilament and pinprick sensation, vibration using a 128-Hz tuning fork, ankle reflexes, visual skin inspection, vascular exam including assessment of pedal pulses, orthopedic exam for deformities, and shoe inspection. Increased risk factors noted on the diabetic foot exam include decreased sensory exam and peripheral neuropathy. Shoegear inspected and found to be appropriate size and wear. Continue to see her PCP and neurologist - evaluation of her back, neuropathy,meds, labs, follow up. I have done what I can do as far as shoe gear and support, tests, and medications.       She wishes to have the left hallux nail removed, and

## 2018-12-23 DIAGNOSIS — E11.8 TYPE 2 DIABETES MELLITUS WITH COMPLICATION, WITH LONG-TERM CURRENT USE OF INSULIN (HCC): ICD-10-CM

## 2018-12-23 DIAGNOSIS — Z79.4 TYPE 2 DIABETES MELLITUS WITH COMPLICATION, WITH LONG-TERM CURRENT USE OF INSULIN (HCC): ICD-10-CM

## 2018-12-24 RX ORDER — DULAGLUTIDE 0.75 MG/.5ML
INJECTION, SOLUTION SUBCUTANEOUS
Qty: 2 ML | Refills: 3 | Status: SHIPPED | OUTPATIENT
Start: 2018-12-24 | End: 2019-04-24 | Stop reason: SDUPTHER

## 2019-01-18 ENCOUNTER — OFFICE VISIT (OUTPATIENT)
Dept: FAMILY MEDICINE CLINIC | Age: 59
End: 2019-01-18
Payer: MEDICAID

## 2019-01-18 VITALS
HEART RATE: 81 BPM | WEIGHT: 186 LBS | HEIGHT: 64 IN | OXYGEN SATURATION: 98 % | BODY MASS INDEX: 31.76 KG/M2 | SYSTOLIC BLOOD PRESSURE: 127 MMHG | DIASTOLIC BLOOD PRESSURE: 82 MMHG

## 2019-01-18 DIAGNOSIS — G62.9 NEUROPATHY: ICD-10-CM

## 2019-01-18 DIAGNOSIS — I10 ESSENTIAL HYPERTENSION: ICD-10-CM

## 2019-01-18 DIAGNOSIS — Z12.11 SCREEN FOR COLON CANCER: ICD-10-CM

## 2019-01-18 DIAGNOSIS — E53.9 VITAMIN B DEFICIENCY: ICD-10-CM

## 2019-01-18 DIAGNOSIS — F51.01 PRIMARY INSOMNIA: ICD-10-CM

## 2019-01-18 DIAGNOSIS — Z13.29 SCREENING FOR THYROID DISORDER: ICD-10-CM

## 2019-01-18 DIAGNOSIS — E55.9 VITAMIN D DEFICIENCY: ICD-10-CM

## 2019-01-18 DIAGNOSIS — E78.2 MIXED HYPERLIPIDEMIA: ICD-10-CM

## 2019-01-18 DIAGNOSIS — Z12.31 SCREENING MAMMOGRAM, ENCOUNTER FOR: Primary | ICD-10-CM

## 2019-01-18 DIAGNOSIS — E11.8 TYPE 2 DIABETES MELLITUS WITH COMPLICATION, WITHOUT LONG-TERM CURRENT USE OF INSULIN (HCC): ICD-10-CM

## 2019-01-18 DIAGNOSIS — F32.A DEPRESSION, UNSPECIFIED DEPRESSION TYPE: ICD-10-CM

## 2019-01-18 PROCEDURE — 2022F DILAT RTA XM EVC RTNOPTHY: CPT | Performed by: FAMILY MEDICINE

## 2019-01-18 PROCEDURE — G8417 CALC BMI ABV UP PARAM F/U: HCPCS | Performed by: FAMILY MEDICINE

## 2019-01-18 PROCEDURE — 3046F HEMOGLOBIN A1C LEVEL >9.0%: CPT | Performed by: FAMILY MEDICINE

## 2019-01-18 PROCEDURE — G8484 FLU IMMUNIZE NO ADMIN: HCPCS | Performed by: FAMILY MEDICINE

## 2019-01-18 PROCEDURE — 1036F TOBACCO NON-USER: CPT | Performed by: FAMILY MEDICINE

## 2019-01-18 PROCEDURE — 3017F COLORECTAL CA SCREEN DOC REV: CPT | Performed by: FAMILY MEDICINE

## 2019-01-18 PROCEDURE — 99213 OFFICE O/P EST LOW 20 MIN: CPT | Performed by: FAMILY MEDICINE

## 2019-01-18 PROCEDURE — G8427 DOCREV CUR MEDS BY ELIG CLIN: HCPCS | Performed by: FAMILY MEDICINE

## 2019-01-18 ASSESSMENT — ENCOUNTER SYMPTOMS
SHORTNESS OF BREATH: 0
CONSTIPATION: 0
EYE PAIN: 0
RECTAL PAIN: 0
BLOOD IN STOOL: 0
EYE DISCHARGE: 0
TROUBLE SWALLOWING: 0
SINUS PRESSURE: 0
EYE REDNESS: 0
ABDOMINAL DISTENTION: 0
ANAL BLEEDING: 0
COUGH: 0
NAUSEA: 0
VOICE CHANGE: 0
CHEST TIGHTNESS: 0
ABDOMINAL PAIN: 0
DIARRHEA: 0
VOMITING: 0
COLOR CHANGE: 0
BACK PAIN: 0

## 2019-01-18 ASSESSMENT — PATIENT HEALTH QUESTIONNAIRE - PHQ9
SUM OF ALL RESPONSES TO PHQ QUESTIONS 1-9: 0
2. FEELING DOWN, DEPRESSED OR HOPELESS: 0
1. LITTLE INTEREST OR PLEASURE IN DOING THINGS: 0
SUM OF ALL RESPONSES TO PHQ QUESTIONS 1-9: 0
2. FEELING DOWN, DEPRESSED OR HOPELESS: 0
SUM OF ALL RESPONSES TO PHQ9 QUESTIONS 1 & 2: 0
1. LITTLE INTEREST OR PLEASURE IN DOING THINGS: 0
SUM OF ALL RESPONSES TO PHQ9 QUESTIONS 1 & 2: 0
SUM OF ALL RESPONSES TO PHQ QUESTIONS 1-9: 0
SUM OF ALL RESPONSES TO PHQ QUESTIONS 1-9: 0

## 2019-01-21 ENCOUNTER — HOSPITAL ENCOUNTER (OUTPATIENT)
Dept: PREADMISSION TESTING | Age: 59
Discharge: HOME | End: 2019-01-25
Payer: MEDICAID

## 2019-01-21 VITALS
DIASTOLIC BLOOD PRESSURE: 64 MMHG | HEART RATE: 81 BPM | RESPIRATION RATE: 16 BRPM | SYSTOLIC BLOOD PRESSURE: 131 MMHG | HEIGHT: 63 IN | WEIGHT: 186 LBS | TEMPERATURE: 97.7 F | BODY MASS INDEX: 32.96 KG/M2

## 2019-01-21 DIAGNOSIS — E11.8 TYPE 2 DIABETES MELLITUS WITH COMPLICATION, WITH LONG-TERM CURRENT USE OF INSULIN (HCC): ICD-10-CM

## 2019-01-21 DIAGNOSIS — Z79.4 TYPE 2 DIABETES MELLITUS WITH COMPLICATION, WITH LONG-TERM CURRENT USE OF INSULIN (HCC): ICD-10-CM

## 2019-01-21 LAB
ABSOLUTE EOS #: 0.34 K/UL (ref 0–0.4)
ABSOLUTE IMMATURE GRANULOCYTE: ABNORMAL K/UL (ref 0–0.3)
ABSOLUTE LYMPH #: 2.52 K/UL (ref 1–4.8)
ABSOLUTE MONO #: 0.41 K/UL (ref 0.1–1.3)
ANION GAP SERPL CALCULATED.3IONS-SCNC: 10 MMOL/L (ref 9–17)
BASOPHILS # BLD: 0 % (ref 0–2)
BASOPHILS ABSOLUTE: 0 K/UL (ref 0–0.2)
BUN BLDV-MCNC: 17 MG/DL (ref 6–20)
BUN/CREAT BLD: ABNORMAL (ref 9–20)
CALCIUM SERPL-MCNC: 9.8 MG/DL (ref 8.6–10.4)
CHLORIDE BLD-SCNC: 104 MMOL/L (ref 98–107)
CO2: 29 MMOL/L (ref 20–31)
CREAT SERPL-MCNC: 0.61 MG/DL (ref 0.5–0.9)
DIFFERENTIAL TYPE: ABNORMAL
EKG ATRIAL RATE: 77 BPM
EKG P AXIS: 62 DEGREES
EKG P-R INTERVAL: 168 MS
EKG Q-T INTERVAL: 392 MS
EKG QRS DURATION: 96 MS
EKG QTC CALCULATION (BAZETT): 443 MS
EKG R AXIS: 1 DEGREES
EKG T AXIS: 39 DEGREES
EKG VENTRICULAR RATE: 77 BPM
EOSINOPHILS RELATIVE PERCENT: 5 % (ref 0–4)
GFR AFRICAN AMERICAN: >60 ML/MIN
GFR NON-AFRICAN AMERICAN: >60 ML/MIN
GFR SERPL CREATININE-BSD FRML MDRD: ABNORMAL ML/MIN/{1.73_M2}
GFR SERPL CREATININE-BSD FRML MDRD: ABNORMAL ML/MIN/{1.73_M2}
GLUCOSE BLD-MCNC: 156 MG/DL (ref 70–99)
HCT VFR BLD CALC: 39 % (ref 36–46)
HEMOGLOBIN: 12.6 G/DL (ref 12–16)
IMMATURE GRANULOCYTES: ABNORMAL %
LYMPHOCYTES # BLD: 37 % (ref 24–44)
MCH RBC QN AUTO: 28.6 PG (ref 26–34)
MCHC RBC AUTO-ENTMCNC: 32.2 G/DL (ref 31–37)
MCV RBC AUTO: 88.8 FL (ref 80–100)
MONOCYTES # BLD: 6 % (ref 1–7)
MORPHOLOGY: ABNORMAL
NRBC AUTOMATED: ABNORMAL PER 100 WBC
PDW BLD-RTO: 16.2 % (ref 11.5–14.9)
PLATELET # BLD: 276 K/UL (ref 150–450)
PLATELET ESTIMATE: ABNORMAL
PMV BLD AUTO: 9.2 FL (ref 6–12)
POTASSIUM SERPL-SCNC: 4.4 MMOL/L (ref 3.7–5.3)
RBC # BLD: 4.39 M/UL (ref 4–5.2)
RBC # BLD: ABNORMAL 10*6/UL
SEG NEUTROPHILS: 52 % (ref 36–66)
SEGMENTED NEUTROPHILS ABSOLUTE COUNT: 3.53 K/UL (ref 1.3–9.1)
SODIUM BLD-SCNC: 143 MMOL/L (ref 135–144)
WBC # BLD: 6.8 K/UL (ref 3.5–11)
WBC # BLD: ABNORMAL 10*3/UL

## 2019-01-21 PROCEDURE — 85025 COMPLETE CBC W/AUTO DIFF WBC: CPT

## 2019-01-21 PROCEDURE — 36415 COLL VENOUS BLD VENIPUNCTURE: CPT

## 2019-01-21 PROCEDURE — 80048 BASIC METABOLIC PNL TOTAL CA: CPT

## 2019-01-21 PROCEDURE — 93005 ELECTROCARDIOGRAM TRACING: CPT

## 2019-01-21 ASSESSMENT — PAIN SCALES - GENERAL: PAINLEVEL_OUTOF10: 6

## 2019-01-21 ASSESSMENT — PAIN DESCRIPTION - PROGRESSION: CLINICAL_PROGRESSION: NOT CHANGED

## 2019-01-21 ASSESSMENT — PAIN DESCRIPTION - DESCRIPTORS: DESCRIPTORS: CONSTANT;THROBBING

## 2019-01-21 ASSESSMENT — PAIN DESCRIPTION - ORIENTATION: ORIENTATION: LEFT

## 2019-01-21 ASSESSMENT — PAIN DESCRIPTION - FREQUENCY: FREQUENCY: CONTINUOUS

## 2019-01-21 ASSESSMENT — PAIN DESCRIPTION - ONSET: ONSET: ON-GOING

## 2019-01-21 ASSESSMENT — PAIN DESCRIPTION - LOCATION: LOCATION: TOE (COMMENT WHICH ONE)

## 2019-01-21 ASSESSMENT — PAIN DESCRIPTION - PAIN TYPE: TYPE: CHRONIC PAIN

## 2019-01-22 ENCOUNTER — ANESTHESIA EVENT (OUTPATIENT)
Dept: OPERATING ROOM | Age: 59
End: 2019-01-22
Payer: MEDICAID

## 2019-01-22 RX ORDER — SODIUM CHLORIDE 0.9 % (FLUSH) 0.9 %
10 SYRINGE (ML) INJECTION PRN
Status: CANCELLED | OUTPATIENT
Start: 2019-01-22

## 2019-01-22 RX ORDER — LIDOCAINE HYDROCHLORIDE 10 MG/ML
1 INJECTION, SOLUTION EPIDURAL; INFILTRATION; INTRACAUDAL; PERINEURAL
Status: CANCELLED | OUTPATIENT
Start: 2019-01-22 | End: 2019-01-22

## 2019-01-22 RX ORDER — SODIUM CHLORIDE 0.9 % (FLUSH) 0.9 %
10 SYRINGE (ML) INJECTION EVERY 12 HOURS SCHEDULED
Status: CANCELLED | OUTPATIENT
Start: 2019-01-22

## 2019-01-22 RX ORDER — SODIUM CHLORIDE, SODIUM LACTATE, POTASSIUM CHLORIDE, CALCIUM CHLORIDE 600; 310; 30; 20 MG/100ML; MG/100ML; MG/100ML; MG/100ML
INJECTION, SOLUTION INTRAVENOUS CONTINUOUS
Status: CANCELLED | OUTPATIENT
Start: 2019-01-22

## 2019-01-23 ENCOUNTER — HOSPITAL ENCOUNTER (OUTPATIENT)
Dept: MAMMOGRAPHY | Age: 59
Discharge: HOME | End: 2019-01-25
Payer: MEDICAID

## 2019-01-23 DIAGNOSIS — Z12.31 SCREENING MAMMOGRAM, ENCOUNTER FOR: ICD-10-CM

## 2019-01-23 PROCEDURE — 77063 BREAST TOMOSYNTHESIS BI: CPT

## 2019-01-24 DIAGNOSIS — I10 ESSENTIAL HYPERTENSION: ICD-10-CM

## 2019-01-24 RX ORDER — LOSARTAN POTASSIUM 25 MG/1
TABLET ORAL
Qty: 90 TABLET | Refills: 0 | Status: SHIPPED | OUTPATIENT
Start: 2019-01-24 | End: 2019-03-26 | Stop reason: SDUPTHER

## 2019-01-28 ENCOUNTER — HOSPITAL ENCOUNTER (OUTPATIENT)
Age: 59
Setting detail: SPECIMEN
Discharge: HOME | End: 2019-01-28
Payer: MEDICAID

## 2019-01-28 ENCOUNTER — OFFICE VISIT (OUTPATIENT)
Dept: OBGYN CLINIC | Age: 59
End: 2019-01-28
Payer: MEDICAID

## 2019-01-28 VITALS
DIASTOLIC BLOOD PRESSURE: 80 MMHG | WEIGHT: 185.9 LBS | BODY MASS INDEX: 32.94 KG/M2 | HEIGHT: 63 IN | SYSTOLIC BLOOD PRESSURE: 134 MMHG

## 2019-01-28 DIAGNOSIS — Z01.419 WOMEN'S ANNUAL ROUTINE GYNECOLOGICAL EXAMINATION: Primary | ICD-10-CM

## 2019-01-28 PROCEDURE — G8484 FLU IMMUNIZE NO ADMIN: HCPCS | Performed by: OBSTETRICS & GYNECOLOGY

## 2019-01-28 PROCEDURE — 99396 PREV VISIT EST AGE 40-64: CPT | Performed by: OBSTETRICS & GYNECOLOGY

## 2019-01-28 ASSESSMENT — ENCOUNTER SYMPTOMS
SHORTNESS OF BREATH: 0
DIARRHEA: 0
ABDOMINAL DISTENTION: 0
CONSTIPATION: 0
BACK PAIN: 0
COUGH: 0
ABDOMINAL PAIN: 0

## 2019-01-29 LAB
HPV SAMPLE: NORMAL
HPV SOURCE: NORMAL
HPV, GENOTYPE 16: NOT DETECTED
HPV, GENOTYPE 18: NOT DETECTED
HPV, HIGH RISK OTHER: NOT DETECTED
HPV, INTERPRETATION: NORMAL

## 2019-01-31 ENCOUNTER — HOSPITAL ENCOUNTER (OUTPATIENT)
Dept: ULTRASOUND IMAGING | Age: 59
Discharge: HOME | End: 2019-02-02
Payer: MEDICAID

## 2019-01-31 ENCOUNTER — HOSPITAL ENCOUNTER (OUTPATIENT)
Dept: MAMMOGRAPHY | Age: 59
Discharge: HOME | End: 2019-02-02
Payer: MEDICAID

## 2019-01-31 DIAGNOSIS — R92.8 ABNORMAL MAMMOGRAM: ICD-10-CM

## 2019-01-31 PROCEDURE — 76642 ULTRASOUND BREAST LIMITED: CPT

## 2019-02-04 ENCOUNTER — HOSPITAL ENCOUNTER (OUTPATIENT)
Age: 59
Setting detail: OUTPATIENT SURGERY
Discharge: HOME | End: 2019-02-04
Attending: PODIATRIST | Admitting: PODIATRIST
Payer: MEDICAID

## 2019-02-04 ENCOUNTER — ANESTHESIA (OUTPATIENT)
Dept: OPERATING ROOM | Age: 59
End: 2019-02-04
Payer: MEDICAID

## 2019-02-04 VITALS
WEIGHT: 186 LBS | RESPIRATION RATE: 16 BRPM | OXYGEN SATURATION: 99 % | TEMPERATURE: 97.9 F | HEART RATE: 66 BPM | HEIGHT: 63 IN | BODY MASS INDEX: 32.96 KG/M2 | DIASTOLIC BLOOD PRESSURE: 62 MMHG | SYSTOLIC BLOOD PRESSURE: 132 MMHG

## 2019-02-04 VITALS — SYSTOLIC BLOOD PRESSURE: 173 MMHG | DIASTOLIC BLOOD PRESSURE: 123 MMHG | OXYGEN SATURATION: 99 %

## 2019-02-04 DIAGNOSIS — G89.18 POSTOPERATIVE PAIN: Primary | ICD-10-CM

## 2019-02-04 LAB
GLUCOSE BLD-MCNC: 117 MG/DL (ref 65–105)
GLUCOSE BLD-MCNC: 132 MG/DL (ref 65–105)

## 2019-02-04 PROCEDURE — 3700000000 HC ANESTHESIA ATTENDED CARE: Performed by: PODIATRIST

## 2019-02-04 PROCEDURE — 7100000010 HC PHASE II RECOVERY - FIRST 15 MIN: Performed by: PODIATRIST

## 2019-02-04 PROCEDURE — 2580000003 HC RX 258: Performed by: NURSE ANESTHETIST, CERTIFIED REGISTERED

## 2019-02-04 PROCEDURE — 2500000003 HC RX 250 WO HCPCS: Performed by: NURSE ANESTHETIST, CERTIFIED REGISTERED

## 2019-02-04 PROCEDURE — 6360000002 HC RX W HCPCS: Performed by: NURSE ANESTHETIST, CERTIFIED REGISTERED

## 2019-02-04 PROCEDURE — 2580000003 HC RX 258: Performed by: ANESTHESIOLOGY

## 2019-02-04 PROCEDURE — 3600000002 HC SURGERY LEVEL 2 BASE: Performed by: PODIATRIST

## 2019-02-04 PROCEDURE — 11730 AVULSION NAIL PLATE SIMPLE 1: CPT | Performed by: PODIATRIST

## 2019-02-04 PROCEDURE — 82947 ASSAY GLUCOSE BLOOD QUANT: CPT

## 2019-02-04 PROCEDURE — 3700000001 HC ADD 15 MINUTES (ANESTHESIA): Performed by: PODIATRIST

## 2019-02-04 PROCEDURE — 2500000003 HC RX 250 WO HCPCS: Performed by: PODIATRIST

## 2019-02-04 PROCEDURE — 7100000000 HC PACU RECOVERY - FIRST 15 MIN: Performed by: PODIATRIST

## 2019-02-04 PROCEDURE — 7100000001 HC PACU RECOVERY - ADDTL 15 MIN: Performed by: PODIATRIST

## 2019-02-04 PROCEDURE — 7100000031 HC ASPR PHASE II RECOVERY - ADDTL 15 MIN: Performed by: PODIATRIST

## 2019-02-04 PROCEDURE — 2709999900 HC NON-CHARGEABLE SUPPLY: Performed by: PODIATRIST

## 2019-02-04 PROCEDURE — 3600000012 HC SURGERY LEVEL 2 ADDTL 15MIN: Performed by: PODIATRIST

## 2019-02-04 PROCEDURE — 6360000002 HC RX W HCPCS: Performed by: PODIATRIST

## 2019-02-04 PROCEDURE — 7100000030 HC ASPR PHASE II RECOVERY - FIRST 15 MIN: Performed by: PODIATRIST

## 2019-02-04 PROCEDURE — 7100000011 HC PHASE II RECOVERY - ADDTL 15 MIN: Performed by: PODIATRIST

## 2019-02-04 PROCEDURE — 20612 ASPIRATE/INJ GANGLION CYST: CPT | Performed by: PODIATRIST

## 2019-02-04 RX ORDER — TRAMADOL HYDROCHLORIDE 50 MG/1
50 TABLET ORAL EVERY 6 HOURS PRN
Qty: 42 TABLET | Refills: 0 | Status: SHIPPED | OUTPATIENT
Start: 2019-02-04 | End: 2019-02-04

## 2019-02-04 RX ORDER — MIDAZOLAM HYDROCHLORIDE 1 MG/ML
INJECTION INTRAMUSCULAR; INTRAVENOUS PRN
Status: DISCONTINUED | OUTPATIENT
Start: 2019-02-04 | End: 2019-02-04 | Stop reason: SDUPTHER

## 2019-02-04 RX ORDER — SODIUM CHLORIDE 0.9 % (FLUSH) 0.9 %
10 SYRINGE (ML) INJECTION EVERY 12 HOURS SCHEDULED
Status: DISCONTINUED | OUTPATIENT
Start: 2019-02-04 | End: 2019-02-04 | Stop reason: HOSPADM

## 2019-02-04 RX ORDER — PROPOFOL 10 MG/ML
INJECTION, EMULSION INTRAVENOUS CONTINUOUS PRN
Status: DISCONTINUED | OUTPATIENT
Start: 2019-02-04 | End: 2019-02-04 | Stop reason: SDUPTHER

## 2019-02-04 RX ORDER — PROMETHAZINE HYDROCHLORIDE 25 MG/ML
6.25 INJECTION, SOLUTION INTRAMUSCULAR; INTRAVENOUS
Status: DISCONTINUED | OUTPATIENT
Start: 2019-02-04 | End: 2019-02-04 | Stop reason: CLARIF

## 2019-02-04 RX ORDER — SODIUM CHLORIDE 0.9 % (FLUSH) 0.9 %
10 SYRINGE (ML) INJECTION PRN
Status: DISCONTINUED | OUTPATIENT
Start: 2019-02-04 | End: 2019-02-04 | Stop reason: HOSPADM

## 2019-02-04 RX ORDER — SODIUM CHLORIDE, SODIUM LACTATE, POTASSIUM CHLORIDE, CALCIUM CHLORIDE 600; 310; 30; 20 MG/100ML; MG/100ML; MG/100ML; MG/100ML
INJECTION, SOLUTION INTRAVENOUS CONTINUOUS PRN
Status: DISCONTINUED | OUTPATIENT
Start: 2019-02-04 | End: 2019-02-04 | Stop reason: SDUPTHER

## 2019-02-04 RX ORDER — LABETALOL HYDROCHLORIDE 5 MG/ML
5 INJECTION, SOLUTION INTRAVENOUS EVERY 10 MIN PRN
Status: DISCONTINUED | OUTPATIENT
Start: 2019-02-04 | End: 2019-02-04 | Stop reason: HOSPADM

## 2019-02-04 RX ORDER — MEPERIDINE HYDROCHLORIDE 50 MG/ML
12.5 INJECTION INTRAMUSCULAR; INTRAVENOUS; SUBCUTANEOUS EVERY 5 MIN PRN
Status: DISCONTINUED | OUTPATIENT
Start: 2019-02-04 | End: 2019-02-04 | Stop reason: HOSPADM

## 2019-02-04 RX ORDER — TRAMADOL HYDROCHLORIDE 50 MG/1
50 TABLET ORAL EVERY 6 HOURS PRN
Qty: 5 TABLET | Refills: 0 | Status: SHIPPED | OUTPATIENT
Start: 2019-02-04 | End: 2019-02-07

## 2019-02-04 RX ORDER — LIDOCAINE HYDROCHLORIDE 10 MG/ML
1 INJECTION, SOLUTION EPIDURAL; INFILTRATION; INTRACAUDAL; PERINEURAL
Status: DISCONTINUED | OUTPATIENT
Start: 2019-02-04 | End: 2019-02-04 | Stop reason: HOSPADM

## 2019-02-04 RX ORDER — OXYCODONE HYDROCHLORIDE AND ACETAMINOPHEN 5; 325 MG/1; MG/1
1 TABLET ORAL PRN
Status: DISCONTINUED | OUTPATIENT
Start: 2019-02-04 | End: 2019-02-04 | Stop reason: HOSPADM

## 2019-02-04 RX ORDER — SODIUM CHLORIDE, SODIUM LACTATE, POTASSIUM CHLORIDE, CALCIUM CHLORIDE 600; 310; 30; 20 MG/100ML; MG/100ML; MG/100ML; MG/100ML
INJECTION, SOLUTION INTRAVENOUS CONTINUOUS
Status: DISCONTINUED | OUTPATIENT
Start: 2019-02-04 | End: 2019-02-04 | Stop reason: HOSPADM

## 2019-02-04 RX ORDER — DIPHENHYDRAMINE HYDROCHLORIDE 50 MG/ML
12.5 INJECTION INTRAMUSCULAR; INTRAVENOUS
Status: DISCONTINUED | OUTPATIENT
Start: 2019-02-04 | End: 2019-02-04 | Stop reason: HOSPADM

## 2019-02-04 RX ORDER — OXYCODONE HYDROCHLORIDE AND ACETAMINOPHEN 5; 325 MG/1; MG/1
2 TABLET ORAL PRN
Status: DISCONTINUED | OUTPATIENT
Start: 2019-02-04 | End: 2019-02-04 | Stop reason: HOSPADM

## 2019-02-04 RX ORDER — LIDOCAINE HYDROCHLORIDE 10 MG/ML
INJECTION, SOLUTION EPIDURAL; INFILTRATION; INTRACAUDAL; PERINEURAL PRN
Status: DISCONTINUED | OUTPATIENT
Start: 2019-02-04 | End: 2019-02-04 | Stop reason: SDUPTHER

## 2019-02-04 RX ORDER — FENTANYL CITRATE 50 UG/ML
INJECTION, SOLUTION INTRAMUSCULAR; INTRAVENOUS PRN
Status: DISCONTINUED | OUTPATIENT
Start: 2019-02-04 | End: 2019-02-04 | Stop reason: SDUPTHER

## 2019-02-04 RX ADMIN — MIDAZOLAM 2 MG: 1 INJECTION INTRAMUSCULAR; INTRAVENOUS at 12:05

## 2019-02-04 RX ADMIN — LIDOCAINE HYDROCHLORIDE 50 MG: 10 INJECTION, SOLUTION EPIDURAL; INFILTRATION; INTRACAUDAL; PERINEURAL at 12:10

## 2019-02-04 RX ADMIN — SODIUM CHLORIDE, POTASSIUM CHLORIDE, SODIUM LACTATE AND CALCIUM CHLORIDE: 600; 310; 30; 20 INJECTION, SOLUTION INTRAVENOUS at 12:05

## 2019-02-04 RX ADMIN — FENTANYL CITRATE 100 MCG: 50 INJECTION, SOLUTION INTRAMUSCULAR; INTRAVENOUS at 12:10

## 2019-02-04 RX ADMIN — SODIUM CHLORIDE, POTASSIUM CHLORIDE, SODIUM LACTATE AND CALCIUM CHLORIDE: 600; 310; 30; 20 INJECTION, SOLUTION INTRAVENOUS at 09:46

## 2019-02-04 RX ADMIN — PROPOFOL 100 MCG/KG/MIN: 10 INJECTION, EMULSION INTRAVENOUS at 12:10

## 2019-02-04 ASSESSMENT — PULMONARY FUNCTION TESTS
PIF_VALUE: 1
PIF_VALUE: 5
PIF_VALUE: 4
PIF_VALUE: 4
PIF_VALUE: 1
PIF_VALUE: 7
PIF_VALUE: 1
PIF_VALUE: 2
PIF_VALUE: 2
PIF_VALUE: 1
PIF_VALUE: 3
PIF_VALUE: 1

## 2019-02-04 ASSESSMENT — PAIN SCALES - GENERAL
PAINLEVEL_OUTOF10: 0
PAINLEVEL_OUTOF10: 0

## 2019-02-04 ASSESSMENT — PAIN - FUNCTIONAL ASSESSMENT: PAIN_FUNCTIONAL_ASSESSMENT: 0-10

## 2019-02-07 ENCOUNTER — OFFICE VISIT (OUTPATIENT)
Dept: PODIATRY | Age: 59
End: 2019-02-07
Payer: MEDICAID

## 2019-02-07 VITALS — BODY MASS INDEX: 32.96 KG/M2 | HEIGHT: 63 IN | WEIGHT: 186 LBS

## 2019-02-07 DIAGNOSIS — B35.1 DERMATOPHYTOSIS OF NAIL: Primary | ICD-10-CM

## 2019-02-07 DIAGNOSIS — M67.472 GANGLION CYST OF LEFT FOOT: ICD-10-CM

## 2019-02-07 DIAGNOSIS — Z98.890 POST-OPERATIVE STATE: ICD-10-CM

## 2019-02-07 PROCEDURE — G8484 FLU IMMUNIZE NO ADMIN: HCPCS | Performed by: PODIATRIST

## 2019-02-07 PROCEDURE — G8427 DOCREV CUR MEDS BY ELIG CLIN: HCPCS | Performed by: PODIATRIST

## 2019-02-07 PROCEDURE — 3017F COLORECTAL CA SCREEN DOC REV: CPT | Performed by: PODIATRIST

## 2019-02-07 PROCEDURE — G8417 CALC BMI ABV UP PARAM F/U: HCPCS | Performed by: PODIATRIST

## 2019-02-07 PROCEDURE — 1036F TOBACCO NON-USER: CPT | Performed by: PODIATRIST

## 2019-02-07 PROCEDURE — 99213 OFFICE O/P EST LOW 20 MIN: CPT | Performed by: PODIATRIST

## 2019-02-08 LAB — CYTOLOGY REPORT: NORMAL

## 2019-02-18 ENCOUNTER — OFFICE VISIT (OUTPATIENT)
Dept: PODIATRY | Age: 59
End: 2019-02-18
Payer: MEDICAID

## 2019-02-18 VITALS — HEIGHT: 63 IN | WEIGHT: 186 LBS | BODY MASS INDEX: 32.96 KG/M2

## 2019-02-18 DIAGNOSIS — M67.472 GANGLION CYST OF LEFT FOOT: ICD-10-CM

## 2019-02-18 DIAGNOSIS — B35.1 DERMATOPHYTOSIS OF NAIL: Primary | ICD-10-CM

## 2019-02-18 DIAGNOSIS — E66.9 OBESITY, UNSPECIFIED CLASSIFICATION, UNSPECIFIED OBESITY TYPE, UNSPECIFIED WHETHER SERIOUS COMORBIDITY PRESENT: ICD-10-CM

## 2019-02-18 DIAGNOSIS — E11.42 DIABETIC POLYNEUROPATHY ASSOCIATED WITH TYPE 2 DIABETES MELLITUS (HCC): ICD-10-CM

## 2019-02-18 PROCEDURE — G8427 DOCREV CUR MEDS BY ELIG CLIN: HCPCS | Performed by: PODIATRIST

## 2019-02-18 PROCEDURE — 99213 OFFICE O/P EST LOW 20 MIN: CPT | Performed by: PODIATRIST

## 2019-02-18 PROCEDURE — 3017F COLORECTAL CA SCREEN DOC REV: CPT | Performed by: PODIATRIST

## 2019-02-18 PROCEDURE — 1036F TOBACCO NON-USER: CPT | Performed by: PODIATRIST

## 2019-02-18 PROCEDURE — G8484 FLU IMMUNIZE NO ADMIN: HCPCS | Performed by: PODIATRIST

## 2019-02-18 PROCEDURE — G8417 CALC BMI ABV UP PARAM F/U: HCPCS | Performed by: PODIATRIST

## 2019-02-18 RX ORDER — GABAPENTIN 300 MG/1
CAPSULE ORAL
Qty: 90 CAPSULE | Refills: 0 | Status: SHIPPED | OUTPATIENT
Start: 2019-02-18 | End: 2019-04-24 | Stop reason: SDUPTHER

## 2019-02-18 RX ORDER — GLIMEPIRIDE 4 MG/1
4 TABLET ORAL EVERY MORNING
Qty: 90 TABLET | Refills: 0 | Status: SHIPPED | OUTPATIENT
Start: 2019-02-18 | End: 2019-04-24 | Stop reason: SDUPTHER

## 2019-03-26 ENCOUNTER — OFFICE VISIT (OUTPATIENT)
Dept: FAMILY MEDICINE CLINIC | Age: 59
End: 2019-03-26
Payer: MEDICAID

## 2019-03-26 VITALS
OXYGEN SATURATION: 98 % | HEIGHT: 63 IN | WEIGHT: 188 LBS | DIASTOLIC BLOOD PRESSURE: 85 MMHG | TEMPERATURE: 98.2 F | BODY MASS INDEX: 33.31 KG/M2 | SYSTOLIC BLOOD PRESSURE: 155 MMHG | HEART RATE: 78 BPM

## 2019-03-26 DIAGNOSIS — E53.9 VITAMIN B DEFICIENCY: ICD-10-CM

## 2019-03-26 DIAGNOSIS — E66.9 OBESITY, UNSPECIFIED CLASSIFICATION, UNSPECIFIED OBESITY TYPE, UNSPECIFIED WHETHER SERIOUS COMORBIDITY PRESENT: ICD-10-CM

## 2019-03-26 DIAGNOSIS — I10 ESSENTIAL HYPERTENSION: ICD-10-CM

## 2019-03-26 DIAGNOSIS — M54.41 RIGHT-SIDED LOW BACK PAIN WITH RIGHT-SIDED SCIATICA, UNSPECIFIED CHRONICITY: ICD-10-CM

## 2019-03-26 DIAGNOSIS — E78.5 DYSLIPIDEMIA: ICD-10-CM

## 2019-03-26 DIAGNOSIS — E11.8 TYPE 2 DIABETES MELLITUS WITH COMPLICATION, WITHOUT LONG-TERM CURRENT USE OF INSULIN (HCC): Primary | ICD-10-CM

## 2019-03-26 DIAGNOSIS — E55.9 VITAMIN D DEFICIENCY: ICD-10-CM

## 2019-03-26 DIAGNOSIS — Z86.39 HISTORY OF DIABETIC NEUROPATHY: ICD-10-CM

## 2019-03-26 DIAGNOSIS — Z13.29 SCREENING FOR THYROID DISORDER: ICD-10-CM

## 2019-03-26 DIAGNOSIS — J20.9 ACUTE TRACHEOBRONCHITIS: ICD-10-CM

## 2019-03-26 PROCEDURE — G8417 CALC BMI ABV UP PARAM F/U: HCPCS | Performed by: FAMILY MEDICINE

## 2019-03-26 PROCEDURE — 1036F TOBACCO NON-USER: CPT | Performed by: FAMILY MEDICINE

## 2019-03-26 PROCEDURE — G8484 FLU IMMUNIZE NO ADMIN: HCPCS | Performed by: FAMILY MEDICINE

## 2019-03-26 PROCEDURE — 2022F DILAT RTA XM EVC RTNOPTHY: CPT | Performed by: FAMILY MEDICINE

## 2019-03-26 PROCEDURE — 3046F HEMOGLOBIN A1C LEVEL >9.0%: CPT | Performed by: FAMILY MEDICINE

## 2019-03-26 PROCEDURE — 99214 OFFICE O/P EST MOD 30 MIN: CPT | Performed by: FAMILY MEDICINE

## 2019-03-26 PROCEDURE — 3017F COLORECTAL CA SCREEN DOC REV: CPT | Performed by: FAMILY MEDICINE

## 2019-03-26 PROCEDURE — G8427 DOCREV CUR MEDS BY ELIG CLIN: HCPCS | Performed by: FAMILY MEDICINE

## 2019-03-26 RX ORDER — AZITHROMYCIN 500 MG/1
500 TABLET, FILM COATED ORAL DAILY
Qty: 3 TABLET | Refills: 0 | Status: SHIPPED | OUTPATIENT
Start: 2019-03-26 | End: 2019-03-29

## 2019-03-26 RX ORDER — LOSARTAN POTASSIUM 25 MG/1
25 TABLET ORAL DAILY
COMMUNITY
End: 2019-04-24 | Stop reason: SDUPTHER

## 2019-03-26 ASSESSMENT — ENCOUNTER SYMPTOMS
DIARRHEA: 0
BLOOD IN STOOL: 0
COUGH: 1
TROUBLE SWALLOWING: 0
EYE DISCHARGE: 0
CHEST TIGHTNESS: 0
SINUS PRESSURE: 0
ABDOMINAL DISTENTION: 0
ABDOMINAL PAIN: 0
SHORTNESS OF BREATH: 0
VOICE CHANGE: 0
COLOR CHANGE: 0
NAUSEA: 0
RECTAL PAIN: 0
CONSTIPATION: 0
EYE PAIN: 0
EYE REDNESS: 0
VOMITING: 0
BACK PAIN: 1
ANAL BLEEDING: 0

## 2019-03-26 ASSESSMENT — PATIENT HEALTH QUESTIONNAIRE - PHQ9
1. LITTLE INTEREST OR PLEASURE IN DOING THINGS: 0
SUM OF ALL RESPONSES TO PHQ9 QUESTIONS 1 & 2: 0
1. LITTLE INTEREST OR PLEASURE IN DOING THINGS: 0
SUM OF ALL RESPONSES TO PHQ QUESTIONS 1-9: 0
2. FEELING DOWN, DEPRESSED OR HOPELESS: 0
SUM OF ALL RESPONSES TO PHQ QUESTIONS 1-9: 0

## 2019-04-03 ENCOUNTER — HOSPITAL ENCOUNTER (OUTPATIENT)
Dept: PHYSICAL THERAPY | Facility: CLINIC | Age: 59
Setting detail: THERAPIES SERIES
Discharge: HOME | End: 2019-04-03
Payer: MEDICAID

## 2019-04-03 PROCEDURE — 97162 PT EVAL MOD COMPLEX 30 MIN: CPT

## 2019-04-03 PROCEDURE — 97110 THERAPEUTIC EXERCISES: CPT

## 2019-04-10 ENCOUNTER — HOSPITAL ENCOUNTER (OUTPATIENT)
Dept: PHYSICAL THERAPY | Facility: CLINIC | Age: 59
Setting detail: THERAPIES SERIES
Discharge: HOME | End: 2019-04-10
Payer: MEDICAID

## 2019-04-10 PROCEDURE — G0283 ELEC STIM OTHER THAN WOUND: HCPCS

## 2019-04-10 PROCEDURE — 97110 THERAPEUTIC EXERCISES: CPT

## 2019-04-10 NOTE — FLOWSHEET NOTE
[] [facility] [facility]  [address]  [phone]  F: [phone] [x] [address]  [facility] [address]   [address]  P: [phone]  F: [phone] [] [place]  [address]  P: [phone]  F: [phone] [] [address]  [facility]   [address]  [place]: [phone]  F: [phone]     Physical Therapy Daily Treatment Note    Date:  4/10/2019  Patient Name:  Gretchen Avalos    :  1960  MRN: [id number]  Physician: Dr. Debby Spring MD                             Insurance: [facility] 30 Vs  Medical Diagnosis: M54.51 - Right-sided low back pain with right-sided sciatica, unspecified chronicity                          Rehab Codes: M54.5, M25.551, M79.604, M79.605, M62.81, R26.2  Onset Date: 3/26/19 referral             Next 's appt. : 19  Visit# / total visits:   Cancels/No Shows: 0/1    Subjective:    Pain:  [x] Yes  [] No Location: R-sided low back Pain Rating: (0-10 scale) 6/10  Pain altered Tx:  [] No  [] Yes  Action:  Comments: Patient apologetic about missing other treatment session this week, noting her mother in law passed away and she forgot about her appointment. Patient continues to note low back pain with radiation down her right leg.     Objective:  Modalities: IFC with hot pack in sitting x 15 minutes to right low back and gluteal region  Precautions:  Exercises:  Exercise Reps/ Time Weight/ Level Comments   NuStep 6' L1          TA activation + PPT 10 x 5\"     TAa + march 10x ea     LTR 5 x 10\" ea                 Prone on elbows 5'   Attempted light bilateral PA mobs while patient was lying prone, limited tolerance   Prone press up            Seated piriformis 3 x 15\"       Child's pose  3 x 20\"             Other:     Specific Instructions for next treatment: progress core strengthening, centralization of back symptoms, lumbar stretching to patient's tolerance           Treatment Charges: Mins Units   [x]  Modalities: IFC/MH 15/15 1/0   [x]  Ther Exercise 25 2   []  Manual Therapy     []  Ther Activities     []  Aquatics     []  Vasocompression     []  Other     Total Treatment time 40 3       Assessment: [x] Progressing toward goals. Initiated exercises as charted above. Patient with limited tolerance in prolonged positioning due to increased right hip and leg pain. Patient with cramping and locking up upon standing after completing exercises. Performed IFC with moist heat for pain control following treatment session with good tolerance. [] No change. [] Other:    STG: (to be met in 8 treatments)  1. ? Pain: Patient will report decreased back pain to 5/10 following prolonged standing, walking, and sitting. 2. ? ROM: Patient will increase lumbar AROM into extension by 10 degrees in order to improve posture in prolonged positions such as sitting and standing. 3. ? Strength: Patient will increase core strength to 4/5 in order to stabilize pelvis and decrease strain placed on her lower back in functional positions such as bending and lifting. 4. ? Function: Patient will demonstrate improved functional activity tolerance as evident by an improved score on the Oswestry to less than 60% impairment. 5. Independent with Home Exercise Programs  LTG: (to be met in 16 treatments)  6.  ? Pain: Patient will report decreased back pain to 3/10 following prolonged standing, walking, and sitting. 7. ? ROM: Patient will increase lumbar AROM into side bending by 10 degrees in order to improve posture in prolonged positions such as sitting and standing. 8. ? Strength: Patient will increase global lower extremity strength to 5/5 in order to increase tolerance to stair climbing. 9. ? Function:  a.  Patient will demonstrate improved functional activity tolerance as evident by an improved score on the Oswestry to less than 50% impairment. b. Patient will report ability to stand and sit for 10 minutes with symmetrical weight distribution and without needing to shift weight to increase tolerance to prolonged positioning.     Patient goals: \"help with pain\"      Pt. Education:  [x] Yes  [] No  [x] Reviewed Prior HEP/Ed  Method of Education: [] Verbal  [x] Demo  [] Written  Comprehension of Education:  [] Verbalizes understanding. [] Demonstrates understanding. [] Needs review. [x] Demonstrates/verbalizes HEP/Ed previously given. Plan: [x] Continue per plan of care.    [] Other:      Time In: 1430            Time Out: 1525    Electronically signed by:  Celine Dolan PT

## 2019-04-11 DIAGNOSIS — E11.8 TYPE 2 DIABETES MELLITUS WITH COMPLICATION, WITH LONG-TERM CURRENT USE OF INSULIN (HCC): ICD-10-CM

## 2019-04-11 DIAGNOSIS — Z79.4 TYPE 2 DIABETES MELLITUS WITH COMPLICATION, WITH LONG-TERM CURRENT USE OF INSULIN (HCC): ICD-10-CM

## 2019-04-15 ENCOUNTER — HOSPITAL ENCOUNTER (OUTPATIENT)
Dept: PHYSICAL THERAPY | Facility: CLINIC | Age: 59
Setting detail: THERAPIES SERIES
Discharge: HOME | End: 2019-04-15
Payer: MEDICAID

## 2019-04-15 PROCEDURE — G0283 ELEC STIM OTHER THAN WOUND: HCPCS

## 2019-04-15 PROCEDURE — 97110 THERAPEUTIC EXERCISES: CPT

## 2019-04-15 NOTE — FLOWSHEET NOTE
[] [facility] [facility]  [address]  [phone]  F: [phone] [x] [address]  [facility] [address]   [address]  P: [phone]  F: [phone] [] [place]  [address]  P: [phone]  F: [phone] [] [address]  [facility]   [address]  [place]: [phone]  F: [phone]     Physical Therapy Daily Treatment Note    Date:  4/15/2019  Patient Name:  Pasquale Heredia    :  1960  MRN: [id number]  Physician: Dr. Kesha Alfredo MD                             Insurance: [facility] 30 Vs  Medical Diagnosis: M54.51 - Right-sided low back pain with right-sided sciatica, unspecified chronicity                          Rehab Codes: M54.5, M25.551, M79.604, M79.605, M62.81, R26.2  Onset Date: 3/26/19 referral             Next 's appt. : 19  Visit# / total visits: 3/16  Cancels/No Shows: 0/1    Subjective:    Pain:  [x] Yes  [] No Location: R-sided low back Pain Rating: (0-10 scale) 5/10  Pain altered Tx:  [] No  [] Yes  Action:  Comments: Pt arrives stating her pain is a little lower today however she has been \"laying low\". Cant sit too long in one position. Numbness/tingling into right LE. Objective:  Modalities: IFC with hot pack in sitting x 15 minutes to right low back and gluteal region  Precautions:  Exercises:  Exercise Reps/ Time Weight/ Level Comments   NuStep 10' L1          TA activation + PPT 15 x 5\"     TA activation + march 10x ea     TA activation + hip add 20x     TA activation + hip abd 20x red    LTR 5 x 10\" ea     HS stretch x  Increased symptoms down into her entire leg with burning   Hip flexor stretch x Off table Increased LB pain. Did not feel anything into front of hip. Prone lying flat 3'  Increased tingling into LE end of 3 min.     Prone lying flat + glute sets 20x     Prone on elbows 5'   Attempted light bilateral PA mobs while patient was lying prone, limited tolerance   Prone press up                  Seated piriformis 3 x 15\"       Child's pose  3 x 20\"             Other:     Specific Instructions for next treatment: progress core strengthening, centralization of back symptoms, lumbar stretching to patient's tolerance      Treatment Charges: Mins Units   [x]  Modalities: IFC/MH 20/20 1/0   [x]  Ther Exercise 30 2   []  Manual Therapy     []  Ther Activities     []  Aquatics     []  Vasocompression     []  Other     Total Treatment time 50 3       Assessment: [x] Progressing toward goals. Pt with reports of leg feeling heavy through out treatment. Pt difficulty relaxing during treatment despite max cueing. Pt anticipates all movement being painful despite not even having trying new stretches yet. TTP along right T12-L1  and stating light pressure in this region increased symptoms into RLE. Reports of increase pain upon completing of exercises and stretches; however, reports a decrease in symptoms with IFC/HP. [] No change. [] Other:    STG: (to be met in 8 treatments)  1. ? Pain: Patient will report decreased back pain to 5/10 following prolonged standing, walking, and sitting. 2. ? ROM: Patient will increase lumbar AROM into extension by 10 degrees in order to improve posture in prolonged positions such as sitting and standing. 3. ? Strength: Patient will increase core strength to 4/5 in order to stabilize pelvis and decrease strain placed on her lower back in functional positions such as bending and lifting. 4. ? Function: Patient will demonstrate improved functional activity tolerance as evident by an improved score on the Oswestry to less than 60% impairment.   5. Independent with Home Exercise Programs  LTG: (to be met in 16 treatments)  6.  ? Pain: Patient will report decreased back pain to 3/10 following prolonged standing, walking, and sitting. 7. ? ROM: Patient will increase lumbar AROM into side bending by 10 degrees in order to improve posture in prolonged positions such as sitting and standing. 8. ? Strength: Patient will increase global lower extremity strength to 5/5 in order to increase tolerance to stair climbing. 9. ? Function:  a. Patient will demonstrate improved functional activity tolerance as evident by an improved score on the Oswestry to less than 50% impairment. b. Patient will report ability to stand and sit for 10 minutes with symmetrical weight distribution and without needing to shift weight to increase tolerance to prolonged positioning.     Patient goals: \"help with pain\"      Pt. Education:  [x] Yes  [] No  [x] Reviewed Prior HEP/Ed  Method of Education: [] Verbal  [x] Demo  [] Written  Comprehension of Education:  [] Verbalizes understanding. [] Demonstrates understanding. [] Needs review. [x] Demonstrates/verbalizes HEP/Ed previously given. Plan: [x] Continue per plan of care.    [] Other:      Time In: 230            Time Out: 3:35    Electronically signed by:  Fany Ochoa PTA

## 2019-04-17 ENCOUNTER — HOSPITAL ENCOUNTER (OUTPATIENT)
Dept: PHYSICAL THERAPY | Facility: CLINIC | Age: 59
Setting detail: THERAPIES SERIES
Discharge: HOME | End: 2019-04-17
Payer: MEDICAID

## 2019-04-17 PROCEDURE — 97110 THERAPEUTIC EXERCISES: CPT

## 2019-04-17 PROCEDURE — G0283 ELEC STIM OTHER THAN WOUND: HCPCS

## 2019-04-17 NOTE — FLOWSHEET NOTE
[] [facility] [facility]  [address]  [phone]  F: [phone] [x] [address]  [facility] 36   [address]  P: [phone]  F: [phone] [] [place]  [address]  P: [phone]  F: [phone] [] [address]  [facility]   [address]  [place]: [phone]  F: [phone]     Physical Therapy Daily Treatment Note    Date:  2019  Patient Name:  Ani Patel    :  1960  MRN: [id number]  Physician: Dr. Sylvia Gutiérrez MD                             Insurance: [facility] 30 Vs  Medical Diagnosis: M54.51 - Right-sided low back pain with right-sided sciatica, unspecified chronicity                          Rehab Codes: M54.5, M25.551, M79.604, M79.605, M62.81, R26.2  Onset Date: 3/26/19 referral             Next 's appt. : 19  Visit# / total visits:   Cancels/No Shows: 0/1    Subjective:    Pain:  [x] Yes  [] No Location: R-sided low back Pain Rating: (0-10 scale) 4/10  Pain altered Tx:  [] No  [] Yes  Action:  Comments: Pt reports that she is feeling pretty good today, noting that it is more stiffness than pain. Patient notes being sore yesterday after Monday's treatment session, but states she feels better today. Objective:  Modalities: IFC with hot pack in sitting x 15 minutes to right low back and gluteal region  Precautions:  Exercises:  Exercise Reps/ Time Weight/ Level Comments   NuStep 10' L1          TA activation + PPT 15 x 5\"     TA activation + march 10x ea     TA activation + hip add 20x     TA activation + hip abd 20x red    LTR 5 x 10\" ea     Seated TA activation + march 10x ea  Added 19   HS stretch 2 x 20\" ea     Hip flexor stretch x Off table Increased LB pain. Did not feel anything into front of hip.           Prone lying flat 3' Increased tingling into LE end of 3 min. Prone lying flat + glute sets 20x     Prone on elbows 5'   Attempted light bilateral PA mobs while patient was lying prone, limited tolerance   Prone press up                  Seated piriformis 3 x 15\"       Child's pose  3 x 20\"             Other:     Specific Instructions for next treatment: progress core strengthening, centralization of back symptoms, lumbar stretching to patient's tolerance      Treatment Charges: Mins Units   [x]  Modalities: IFC/MH 20/20 1/0   [x]  Ther Exercise 30 2   []  Manual Therapy     []  Ther Activities     []  Aquatics     []  Vasocompression     []  Other     Total Treatment time 50 3       Assessment: [x] Progressing toward goals. Patient with increased tolerance to exercises and decreased cuing required to relax. Patient reports at the end of treatment session that it feels as if her legs are waking up after falling asleep due to increased numbness felt after lying prone. [] No change. [] Other:    STG: (to be met in 8 treatments)  1. ? Pain: Patient will report decreased back pain to 5/10 following prolonged standing, walking, and sitting. 2. ? ROM: Patient will increase lumbar AROM into extension by 10 degrees in order to improve posture in prolonged positions such as sitting and standing. 3. ? Strength: Patient will increase core strength to 4/5 in order to stabilize pelvis and decrease strain placed on her lower back in functional positions such as bending and lifting. 4. ? Function: Patient will demonstrate improved functional activity tolerance as evident by an improved score on the Oswestry to less than 60% impairment. 5. Independent with Home Exercise Programs  LTG: (to be met in 16 treatments)  6.  ? Pain: Patient will report decreased back pain to 3/10 following prolonged standing, walking, and sitting.   7. ? ROM: Patient will increase lumbar AROM into side bending by 10 degrees in order to improve posture in prolonged positions such as sitting and standing. 8. ? Strength: Patient will increase global lower extremity strength to 5/5 in order to increase tolerance to stair climbing. 9. ? Function:  a. Patient will demonstrate improved functional activity tolerance as evident by an improved score on the Oswestry to less than 50% impairment. b. Patient will report ability to stand and sit for 10 minutes with symmetrical weight distribution and without needing to shift weight to increase tolerance to prolonged positioning.     Patient goals: \"help with pain\"      Pt. Education:  [x] Yes  [] No  [x] Reviewed Prior HEP/Ed  Method of Education: [] Verbal  [x] Demo  [] Written  Comprehension of Education:  [] Verbalizes understanding. [] Demonstrates understanding. [] Needs review. [x] Demonstrates/verbalizes HEP/Ed previously given. Plan: [x] Continue per plan of care.    [] Other:      Time In: 5659            Time Out: 8820    Electronically signed by:  Mirella Abraham PT

## 2019-04-18 ENCOUNTER — HOSPITAL ENCOUNTER (OUTPATIENT)
Age: 59
Discharge: HOME | End: 2019-04-18
Payer: MEDICAID

## 2019-04-18 ENCOUNTER — HOSPITAL ENCOUNTER (OUTPATIENT)
Dept: GENERAL RADIOLOGY | Age: 59
Discharge: HOME | End: 2019-04-20
Payer: MEDICAID

## 2019-04-18 ENCOUNTER — HOSPITAL ENCOUNTER (OUTPATIENT)
Age: 59
Discharge: HOME | End: 2019-04-20
Payer: MEDICAID

## 2019-04-18 DIAGNOSIS — E11.8 TYPE 2 DIABETES MELLITUS WITH COMPLICATION, WITHOUT LONG-TERM CURRENT USE OF INSULIN (HCC): ICD-10-CM

## 2019-04-18 DIAGNOSIS — E55.9 VITAMIN D DEFICIENCY: ICD-10-CM

## 2019-04-18 DIAGNOSIS — M54.41 RIGHT-SIDED LOW BACK PAIN WITH RIGHT-SIDED SCIATICA, UNSPECIFIED CHRONICITY: ICD-10-CM

## 2019-04-18 DIAGNOSIS — E53.9 VITAMIN B DEFICIENCY: ICD-10-CM

## 2019-04-18 DIAGNOSIS — Z13.29 SCREENING FOR THYROID DISORDER: ICD-10-CM

## 2019-04-18 DIAGNOSIS — E78.5 DYSLIPIDEMIA: ICD-10-CM

## 2019-04-18 LAB
ALBUMIN SERPL-MCNC: 4.9 G/DL (ref 3.5–5.2)
ALBUMIN/GLOBULIN RATIO: 1.6 (ref 1–2.5)
ALP BLD-CCNC: 119 U/L (ref 35–104)
ALT SERPL-CCNC: 25 U/L (ref 5–33)
ANION GAP SERPL CALCULATED.3IONS-SCNC: 14 MMOL/L (ref 9–17)
AST SERPL-CCNC: 23 U/L
BILIRUB SERPL-MCNC: 0.47 MG/DL (ref 0.3–1.2)
BUN BLDV-MCNC: 16 MG/DL (ref 6–20)
BUN/CREAT BLD: ABNORMAL (ref 9–20)
CALCIUM SERPL-MCNC: 9.9 MG/DL (ref 8.6–10.4)
CHLORIDE BLD-SCNC: 106 MMOL/L (ref 98–107)
CHOLESTEROL/HDL RATIO: 2.4
CHOLESTEROL: 138 MG/DL
CO2: 24 MMOL/L (ref 20–31)
CREAT SERPL-MCNC: 0.76 MG/DL (ref 0.5–0.9)
CREATININE URINE: 300.6 MG/DL (ref 28–217)
ESTIMATED AVERAGE GLUCOSE: 143 MG/DL
FOLATE: 16.5 NG/ML
GFR AFRICAN AMERICAN: >60 ML/MIN
GFR NON-AFRICAN AMERICAN: >60 ML/MIN
GFR SERPL CREATININE-BSD FRML MDRD: ABNORMAL ML/MIN/{1.73_M2}
GFR SERPL CREATININE-BSD FRML MDRD: ABNORMAL ML/MIN/{1.73_M2}
GLUCOSE BLD-MCNC: 75 MG/DL (ref 70–99)
HBA1C MFR BLD: 6.6 % (ref 4–6)
HCT VFR BLD CALC: 40.6 % (ref 36.3–47.1)
HDLC SERPL-MCNC: 57 MG/DL
HEMOGLOBIN: 12.6 G/DL (ref 11.9–15.1)
LDL CHOLESTEROL: 66 MG/DL (ref 0–130)
MCH RBC QN AUTO: 28.5 PG (ref 25.2–33.5)
MCHC RBC AUTO-ENTMCNC: 31 G/DL (ref 28.4–34.8)
MCV RBC AUTO: 91.9 FL (ref 82.6–102.9)
MICROALBUMIN/CREAT 24H UR: <12 MG/L
MICROALBUMIN/CREAT UR-RTO: ABNORMAL MCG/MG CREAT
NRBC AUTOMATED: 0 PER 100 WBC
PDW BLD-RTO: 16.2 % (ref 11.8–14.4)
PLATELET # BLD: 266 K/UL (ref 138–453)
PMV BLD AUTO: 11.7 FL (ref 8.1–13.5)
POTASSIUM SERPL-SCNC: 3.8 MMOL/L (ref 3.7–5.3)
RBC # BLD: 4.42 M/UL (ref 3.95–5.11)
SODIUM BLD-SCNC: 144 MMOL/L (ref 135–144)
T3 FREE: 3.17 PG/ML (ref 2.02–4.43)
THYROXINE, FREE: 1.04 NG/DL (ref 0.93–1.7)
TOTAL PROTEIN: 7.9 G/DL (ref 6.4–8.3)
TRIGL SERPL-MCNC: 74 MG/DL
TSH SERPL DL<=0.05 MIU/L-ACNC: 0.91 MIU/L (ref 0.3–5)
VITAMIN B-12: 610 PG/ML (ref 232–1245)
VITAMIN D 25-HYDROXY: 15.4 NG/ML (ref 30–100)
VLDLC SERPL CALC-MCNC: NORMAL MG/DL (ref 1–30)
WBC # BLD: 6.4 K/UL (ref 3.5–11.3)

## 2019-04-18 PROCEDURE — 72114 X-RAY EXAM L-S SPINE BENDING: CPT

## 2019-04-18 PROCEDURE — 82746 ASSAY OF FOLIC ACID SERUM: CPT

## 2019-04-18 PROCEDURE — 80053 COMPREHEN METABOLIC PANEL: CPT

## 2019-04-18 PROCEDURE — 84439 ASSAY OF FREE THYROXINE: CPT

## 2019-04-18 PROCEDURE — 84443 ASSAY THYROID STIM HORMONE: CPT

## 2019-04-18 PROCEDURE — 82043 UR ALBUMIN QUANTITATIVE: CPT

## 2019-04-18 PROCEDURE — 80061 LIPID PANEL: CPT

## 2019-04-18 PROCEDURE — 82306 VITAMIN D 25 HYDROXY: CPT

## 2019-04-18 PROCEDURE — 36415 COLL VENOUS BLD VENIPUNCTURE: CPT

## 2019-04-18 PROCEDURE — 85027 COMPLETE CBC AUTOMATED: CPT

## 2019-04-18 PROCEDURE — 82570 ASSAY OF URINE CREATININE: CPT

## 2019-04-18 PROCEDURE — 84481 FREE ASSAY (FT-3): CPT

## 2019-04-18 PROCEDURE — 83036 HEMOGLOBIN GLYCOSYLATED A1C: CPT

## 2019-04-18 PROCEDURE — 82607 VITAMIN B-12: CPT

## 2019-04-22 ENCOUNTER — TELEPHONE (OUTPATIENT)
Dept: GASTROENTEROLOGY | Age: 59
End: 2019-04-22

## 2019-04-24 ENCOUNTER — HOSPITAL ENCOUNTER (OUTPATIENT)
Dept: PHYSICAL THERAPY | Facility: CLINIC | Age: 59
Setting detail: THERAPIES SERIES
Discharge: HOME | End: 2019-04-24
Payer: MEDICAID

## 2019-04-24 ENCOUNTER — OFFICE VISIT (OUTPATIENT)
Dept: FAMILY MEDICINE CLINIC | Age: 59
End: 2019-04-24
Payer: MEDICAID

## 2019-04-24 VITALS
SYSTOLIC BLOOD PRESSURE: 137 MMHG | DIASTOLIC BLOOD PRESSURE: 79 MMHG | BODY MASS INDEX: 33.77 KG/M2 | HEIGHT: 63 IN | WEIGHT: 190.6 LBS | OXYGEN SATURATION: 99 % | HEART RATE: 87 BPM

## 2019-04-24 DIAGNOSIS — M54.5 CHRONIC LOW BACK PAIN, UNSPECIFIED BACK PAIN LATERALITY, WITH SCIATICA PRESENCE UNSPECIFIED: Primary | ICD-10-CM

## 2019-04-24 DIAGNOSIS — I10 ESSENTIAL HYPERTENSION: ICD-10-CM

## 2019-04-24 DIAGNOSIS — E11.8 TYPE 2 DIABETES MELLITUS WITH COMPLICATION, WITH LONG-TERM CURRENT USE OF INSULIN (HCC): ICD-10-CM

## 2019-04-24 DIAGNOSIS — E11.42 DIABETIC POLYNEUROPATHY ASSOCIATED WITH TYPE 2 DIABETES MELLITUS (HCC): ICD-10-CM

## 2019-04-24 DIAGNOSIS — G89.29 CHRONIC LOW BACK PAIN, UNSPECIFIED BACK PAIN LATERALITY, WITH SCIATICA PRESENCE UNSPECIFIED: Primary | ICD-10-CM

## 2019-04-24 DIAGNOSIS — E66.9 OBESITY, UNSPECIFIED CLASSIFICATION, UNSPECIFIED OBESITY TYPE, UNSPECIFIED WHETHER SERIOUS COMORBIDITY PRESENT: ICD-10-CM

## 2019-04-24 DIAGNOSIS — E55.9 VITAMIN D DEFICIENCY: ICD-10-CM

## 2019-04-24 DIAGNOSIS — Z79.4 TYPE 2 DIABETES MELLITUS WITH COMPLICATION, WITH LONG-TERM CURRENT USE OF INSULIN (HCC): ICD-10-CM

## 2019-04-24 DIAGNOSIS — E78.5 DYSLIPIDEMIA: ICD-10-CM

## 2019-04-24 PROCEDURE — G8427 DOCREV CUR MEDS BY ELIG CLIN: HCPCS | Performed by: FAMILY MEDICINE

## 2019-04-24 PROCEDURE — 1036F TOBACCO NON-USER: CPT | Performed by: FAMILY MEDICINE

## 2019-04-24 PROCEDURE — 2022F DILAT RTA XM EVC RTNOPTHY: CPT | Performed by: FAMILY MEDICINE

## 2019-04-24 PROCEDURE — G8417 CALC BMI ABV UP PARAM F/U: HCPCS | Performed by: FAMILY MEDICINE

## 2019-04-24 PROCEDURE — 3044F HG A1C LEVEL LT 7.0%: CPT | Performed by: FAMILY MEDICINE

## 2019-04-24 PROCEDURE — 99214 OFFICE O/P EST MOD 30 MIN: CPT | Performed by: FAMILY MEDICINE

## 2019-04-24 PROCEDURE — 3017F COLORECTAL CA SCREEN DOC REV: CPT | Performed by: FAMILY MEDICINE

## 2019-04-24 RX ORDER — LOSARTAN POTASSIUM 50 MG/1
50 TABLET ORAL DAILY
Qty: 30 TABLET | Refills: 1 | Status: SHIPPED | OUTPATIENT
Start: 2019-04-24 | End: 2019-04-30 | Stop reason: SDUPTHER

## 2019-04-24 RX ORDER — GABAPENTIN 300 MG/1
600 CAPSULE ORAL NIGHTLY
Qty: 60 CAPSULE | Refills: 0 | Status: SHIPPED | OUTPATIENT
Start: 2019-04-24 | End: 2019-08-21 | Stop reason: SDUPTHER

## 2019-04-24 RX ORDER — ERGOCALCIFEROL 1.25 MG/1
50000 CAPSULE ORAL WEEKLY
Qty: 5 CAPSULE | Refills: 5 | Status: SHIPPED | OUTPATIENT
Start: 2019-04-24 | End: 2019-05-23 | Stop reason: SDUPTHER

## 2019-04-24 RX ORDER — GLIMEPIRIDE 4 MG/1
4 TABLET ORAL EVERY MORNING
Qty: 90 TABLET | Refills: 0 | Status: SHIPPED | OUTPATIENT
Start: 2019-04-24 | End: 2019-11-06 | Stop reason: SDUPTHER

## 2019-04-24 RX ORDER — PREDNISONE 10 MG/1
10 TABLET ORAL
Qty: 15 TABLET | Refills: 0 | Status: SHIPPED | OUTPATIENT
Start: 2019-04-24 | End: 2019-04-29

## 2019-04-24 RX ORDER — ATORVASTATIN CALCIUM 10 MG/1
10 TABLET, FILM COATED ORAL DAILY
Qty: 90 TABLET | Refills: 3 | Status: SHIPPED | OUTPATIENT
Start: 2019-04-24 | End: 2019-11-06 | Stop reason: SDUPTHER

## 2019-04-24 ASSESSMENT — ENCOUNTER SYMPTOMS
EYE REDNESS: 0
SHORTNESS OF BREATH: 0
CONSTIPATION: 0
SINUS PRESSURE: 0
COUGH: 0
BACK PAIN: 1
RECTAL PAIN: 0
ABDOMINAL DISTENTION: 0
ABDOMINAL PAIN: 0
DIARRHEA: 0
BLOOD IN STOOL: 0
EYE PAIN: 0
ANAL BLEEDING: 0
COLOR CHANGE: 0
NAUSEA: 0
TROUBLE SWALLOWING: 0
VOMITING: 0
CHEST TIGHTNESS: 0
EYE DISCHARGE: 0
VOICE CHANGE: 0

## 2019-04-24 ASSESSMENT — PATIENT HEALTH QUESTIONNAIRE - PHQ9
SUM OF ALL RESPONSES TO PHQ QUESTIONS 1-9: 0
SUM OF ALL RESPONSES TO PHQ9 QUESTIONS 1 & 2: 0
1. LITTLE INTEREST OR PLEASURE IN DOING THINGS: 0
SUM OF ALL RESPONSES TO PHQ QUESTIONS 1-9: 0
2. FEELING DOWN, DEPRESSED OR HOPELESS: 0

## 2019-04-24 NOTE — PROGRESS NOTES
Subjective:      Patient ID: Arleth Haynes is a 60 y.o. female. HPI  States has been going for PT since last visit. States while at PT seems to help but after the therapy   the pain seems to act up more. States sometimes cannot hardly get out of the car at times. Feels tired a lot- states pain in her back stops her from sleeping, 300 mg at night of gabapentin is not helping. Review of Systems   Constitutional: Negative for activity change, appetite change and fatigue. HENT: Negative for dental problem, ear pain, hearing loss, postnasal drip, sinus pressure, sneezing, tinnitus, trouble swallowing and voice change. Eyes: Negative for pain, discharge, redness and visual disturbance. Respiratory: Negative for cough, chest tightness and shortness of breath. Cardiovascular: Negative for chest pain, palpitations and leg swelling. Gastrointestinal: Negative for abdominal distention, abdominal pain, anal bleeding, blood in stool, constipation, diarrhea, nausea, rectal pain and vomiting. Endocrine: Negative for cold intolerance, heat intolerance, polydipsia, polyphagia and polyuria. Genitourinary: Negative for decreased urine volume, difficulty urinating, dyspareunia, dysuria, enuresis, flank pain, frequency, genital sores, hematuria, menstrual problem, pelvic pain, urgency, vaginal bleeding and vaginal discharge. Musculoskeletal: Positive for back pain and myalgias. Negative for arthralgias, gait problem, joint swelling, neck pain and neck stiffness. Skin: Negative for color change, pallor and rash. Allergic/Immunologic: Negative for environmental allergies, food allergies and immunocompromised state. Neurological: Positive for numbness. Negative for dizziness, tremors, seizures, syncope, facial asymmetry, speech difficulty, weakness, light-headedness and headaches. Hematological: Negative for adenopathy. Does not bruise/bleed easily. Psychiatric/Behavioral: Positive for sleep disturbance. 6. Essential hypertension  losartan (COZAAR) 50 MG tablet   7. Vitamin D deficiency           Plan:      No orders of the defined types were placed in this encounter. Outpatient Encounter Medications as of 4/24/2019   Medication Sig Dispense Refill    atorvastatin (LIPITOR) 10 MG tablet Take 1 tablet by mouth daily 90 tablet 3    gabapentin (NEURONTIN) 300 MG capsule Take 2 capsules by mouth nightly for 30 doses. 60 capsule 0    glimepiride (AMARYL) 4 MG tablet Take 1 tablet by mouth every morning 90 tablet 0    metFORMIN (GLUCOPHAGE) 1000 MG tablet take 1 tablet by mouth once daily (pt takes at night) 90 tablet 0    Dulaglutide (TRULICITY) 9.04 PB/8.0UR SOPN Inject 0.5 mLs into the skin once a week for 5 doses 5 pen 1    losartan (COZAAR) 50 MG tablet Take 1 tablet by mouth daily 30 tablet 1    predniSONE (DELTASONE) 10 MG tablet Take 1 tablet by mouth 3 times daily (with meals) for 5 days 15 tablet 0    vitamin D (ERGOCALCIFEROL) 12746 units CAPS capsule Take 1 capsule by mouth once a week 5 capsule 5    mupirocin (BACTROBAN) 2 % ointment Apply topically 3 times daily. 22 g 0    blood glucose monitor strips Use to check BS  strip 3    Lancets 30G MISC 1 each by Does not apply route 2 times daily Use to check BS BID Dx E 11.9 100 each 3    Blood Glucose Monitoring Suppl (ONE TOUCH ULTRA 2) w/Device KIT use as directed DUE TO PATIENTS INSURANCE, WE NEED TO GET A NEW METER FOR ONE TOUCH, DUE TO INS. [employer] 1 kit 0    aspirin 81 MG tablet Take 81 mg by mouth daily.       [DISCONTINUED] metFORMIN (GLUCOPHAGE) 1000 MG tablet take 1 tablet by mouth once daily (pt takes at night) 90 tablet 0    [DISCONTINUED] losartan (COZAAR) 25 MG tablet Take 25 mg by mouth daily      [DISCONTINUED] glimepiride (AMARYL) 4 MG tablet take 1 tablet by mouth every morning 90 tablet 0    [DISCONTINUED] gabapentin (NEURONTIN) 300 MG capsule take 1 capsule by mouth NIGHTLY 90 capsule 0    [DISCONTINUED] TRULICITY 0.75 MG/0.5ML SOPN inject 0.5 milliliter INTO THE SKIN EVERY 7 DAYS. 2 mL 3    [DISCONTINUED] atorvastatin (LIPITOR) 10 MG tablet Take 1 tablet by mouth daily (Patient taking differently: Take 10 mg by mouth nightly ) 30 tablet 3     No facility-administered encounter medications on file as of 4/24/2019. Will send to PM if pain in her back continues despite PT for 6 weeks.     Debbie Platt MD

## 2019-04-24 NOTE — PROGRESS NOTES
(Originally 8/1/1978)    Diabetic retinal exam  07/16/2019    Colon Cancer Screen FIT/FOBT  08/30/2019    Diabetic foot exam  12/18/2019    Breast cancer screen  01/23/2020    A1C test (Diabetic or Prediabetic)  04/18/2020    Diabetic microalbuminuria test  04/18/2020    Lipid screen  04/18/2020    Potassium monitoring  04/18/2020    Creatinine monitoring  04/18/2020    Cervical cancer screen  01/28/2022    Flu vaccine  Completed    Pneumococcal 0-64 years Vaccine  Completed    Hepatitis C screen  Completed    HIV screen  Completed

## 2019-05-01 ENCOUNTER — HOSPITAL ENCOUNTER (OUTPATIENT)
Dept: PHYSICAL THERAPY | Facility: CLINIC | Age: 59
Setting detail: THERAPIES SERIES
Discharge: HOME | End: 2019-05-01
Payer: MEDICAID

## 2019-05-07 ENCOUNTER — HOSPITAL ENCOUNTER (OUTPATIENT)
Dept: PHYSICAL THERAPY | Facility: CLINIC | Age: 59
Setting detail: THERAPIES SERIES
Discharge: HOME | End: 2019-05-07
Payer: MEDICAID

## 2019-05-07 PROCEDURE — 97110 THERAPEUTIC EXERCISES: CPT

## 2019-05-07 PROCEDURE — G0283 ELEC STIM OTHER THAN WOUND: HCPCS

## 2019-05-07 NOTE — FLOWSHEET NOTE
[] [facility]) - [facility] &  Therapy  [address]  [phone]  F: [phone] [x] [address] [facility] [address]   [address]  P: [phone]  F: [phone] [] [address] &  Therapy  [address] [facility]  P: [phone]  F: [phone] [] [address]  [facility]   [address]  [place]: [phone]  F: [phone]     Physical Therapy Daily Treatment Note    Date:  2019  Patient Name:  Birgit Connell    :  1960  MRN: [id number]  Physician: Dr. Constance Severin, MD                             Insurance: [facility] 30 Vs  Medical Diagnosis: M54.51 - Right-sided low back pain with right-sided sciatica, unspecified chronicity                          Rehab Codes: M54.5, M25.551, M79.604, M79.605, M62.81, R26.2  Onset Date: 3/26/19 referral             Next 's appt. : 19  Visit# / total visits:   Cancels/No Shows: 0/1    Subjective:    Pain:  [x] Yes  [] No Location: R-sided low back Pain Rating: (0-10 scale) 8/10  Pain altered Tx:  [] No  [x] Yes  Action: Minimal exercises completed  Comments: Pt arrives reporting high levels of pain. Tingling to toes in right foot that are constant, Left foot not as often. Reports that she went to see Dr. Gaming Shown and has a follow up this Thursday. States that she wishes she \" could remove leg, hip, pelvis and would feel much better\". Denies any Bladder and bowel problems- incontinence or constipation of urine or fecal matter. States that she has urinated in her pants however not because she had a bladder issue but because her leg pain was so high she could not walk to the bath room quick enough. Reports that an MRI has not been completed.      Objective:  Modalities: IFC with hot pack in sitting x 15 minutes to right low back and gluteal region  Precautions:  Exercises:  Exercise Reps/ Time Weight/ Level Comments   NuStep 5' L1  Painful         TA activation + PPT 15 x 5\"     TA activation + march 5x ea  Painful   TA activation + hip add 20x     TA activation + hip abd 20x red    LTR 5 x 10\" ea  Painful   Seated TA activation + march 10x ea  Added 4/17/19   HS stretch 2 x 20\" ea     Hip flexor stretch x Off table Increased LB pain. Did not feel anything into front of hip. Prone lying flat 3'  One pillow trial, increased pain  Two pillow trial increased pain  Increased tingling into LE end of 3 min. Prone lying flat + glute sets 20x     Prone on elbows 5'   One pillow trial, increased pain  Two pillow trial increased pain   Prone press up                  Seated piriformis 3 x 15\"       Child's pose  3 x 20\"   Painful    sciatic nerve stretch 4x5\"  Painful   Other:     Specific Instructions for next treatment: progress core strengthening, centralization of back symptoms, lumbar stretching to patient's tolerance      Treatment Charges: Mins Units   [x]  Modalities: IFC/MH 20/20 1/0   [x]  Ther Exercise 25 2   []  Manual Therapy     []  Ther Activities     []  Aquatics     []  Vasocompression     []  Other     Total Treatment time 45 3       Assessment: [] Progressing toward goals. [x] No change. Pt with minimal to no improvement through out treatment. Time spent trying different positions to fine relief. Pt unable to lay prone, supine or sidelying due to high pain levels. Pt with difficulty in any position. Attempted sciatic nerve stretch with poor tolerance. Educated patient on trying aquatic due to poor tolerance to land therapy. Pt with good understanding and willing to try. Educated patient to seek medical attention by calling doctor or going to the ER if pain persists or gets higher. [x] Other: Trial aquatic therapy.      STG: (to be met in 8 treatments)  1. ? Pain: Patient will report decreased back pain to 5/10 following prolonged standing, walking, and sitting. 2. ? ROM: Patient will increase lumbar AROM into extension by 10 degrees in order to improve posture in prolonged positions such as sitting and standing. 3. ? Strength: Patient will increase core strength to 4/5 in order to stabilize pelvis and decrease strain placed on her lower back in functional positions such as bending and lifting. 4. ? Function: Patient will demonstrate improved functional activity tolerance as evident by an improved score on the Oswestry to less than 60% impairment. 5. Independent with Home Exercise Programs  LTG: (to be met in 16 treatments)  6.  ? Pain: Patient will report decreased back pain to 3/10 following prolonged standing, walking, and sitting. 7. ? ROM: Patient will increase lumbar AROM into side bending by 10 degrees in order to improve posture in prolonged positions such as sitting and standing. 8. ? Strength: Patient will increase global lower extremity strength to 5/5 in order to increase tolerance to stair climbing. 9. ? Function:  a. Patient will demonstrate improved functional activity tolerance as evident by an improved score on the Oswestry to less than 50% impairment. b. Patient will report ability to stand and sit for 10 minutes with symmetrical weight distribution and without needing to shift weight to increase tolerance to prolonged positioning.     Patient goals: \"help with pain\"      Pt. Education:  [x] Yes  [] No  [x] Reviewed Prior HEP/Ed  Method of Education: [] Verbal  [x] Demo  [] Written  Comprehension of Education:  [] Verbalizes understanding. [] Demonstrates understanding. [] Needs review. [x] Demonstrates/verbalizes HEP/Ed previously given. Plan: [x] Continue per plan of care.    [x] Other: aquatic therapy      Time In: 200            Time Out: 300    Electronically signed by:  Eloise Montgomery PTA

## 2019-05-08 NOTE — PROGRESS NOTES
[] [address]  [address]  [phone]  F: [phone] [x] [address]  [address] [facility]   [address]  P: [phone]  F: [phone] [] [address]  [address] [facility]  P: [phone]  F: [phone] [] [address]  [facility]   [address]  [place]: [phone]  F: [phone]     Physical Therapy Progress Note    Date: 2019      Patient: Good Hunt  : 1960  MRN: [id number]    Physician: Dr. Nilson Cantu MD                             Insurance: [facility] 30 Vs  Medical Diagnosis: M54.51 - Right-sided low back pain with right-sided sciatica, unspecified chronicity                          Rehab Codes: M54.5, M25.551, M79.604, M79.605, M62.81, R26.2  Onset Date: 3/26/19 referral             Next 's appt. : 19  Visit# / total visits:                     Cancels/No Shows: 0/  Date of initial visit: 4/3/19                Date of final visit: tbd      Subjective:  Pain:  [x] Yes  [] No          Location: R-sided low back     Pain Rating: (0-10 scale) 8/10  Pain altered Tx:  [] No  [x] Yes  Action: Minimal exercises completed  Comments: Pt arrives reporting high levels of pain. Tingling to toes in right foot that are constant, Left foot not as often. Reports that she went to see Dr. Emerson and has a follow up this Thursday. States that she wishes she \" could remove leg, hip, pelvis and would feel much better\". Denies any Bladder and bowel problems- incontinence or constipation of urine or fecal matter. States that she has urinated in her pants however not because she had a bladder issue but because her leg pain was so high she could not walk to the bath room quick enough. Reports that an MRI has not been completed.  Discussed treatment options with patient and PTA and plan to transfer patient to [facility] for aquatic physical therapy due to patient's low tolerance to gym exercises at this date. Assessment:  Pt with minimal to no improvement through out treatment. Time spent trying different positions to fine relief. Pt unable to lay prone, supine or sidelying due to high pain levels. Pt with difficulty in any position. Attempted sciatic nerve stretch with poor tolerance. Educated patient on trying aquatic due to poor tolerance to land therapy. Pt with good understanding and willing to try. Educated patient to seek medical attention by calling doctor or going to the ER if pain persists or gets higher. Treatment to Date:  [x] Therapeutic Exercise    [x] Modalities:  [] Therapeutic Activity    [] Ultrasound  [x] Electrical Stimulation  [] Gait Training     [] Massage       [] Lumbar/Cervical Traction  [] Neuromuscular Re-education [x] Cold/hotpack [] Iontophoresis: 4 mg/mL  [x] Instruction in Home Exercise Program                     Dexamethasone Sodium  [x] Manual Therapy             Phosphate 40-80 mAmin  [] Aquatic Therapy                   [] Vasocompression/   [] Other:  [] Dry Needling                                           Game Ready        Patient Status:     [] Continue per initial plan of care. [] Additional visits necessary. [x] Other: add aquatic therapy to patient plan of care, transfer patient to [facility] under direction of current primary PT     Requested Frequency/Duration: remaining 12 treatment sessions        Electronically signed by Johnny Aranda PT on 5/8/2019 at 9:26 AM      If you have any questions or concerns, please don't hesitate to call. Thank you for your referral.    Physician Signature:________________________________Date:__________________  By signing above or cosigning this note, I have reviewed this plan of care and certify a need for medically necessary rehabilitation services.      *PLEASE SIGN ABOVE AND FAX BACK ALL PAGES*

## 2019-05-09 ENCOUNTER — HOSPITAL ENCOUNTER (OUTPATIENT)
Dept: GENERAL RADIOLOGY | Age: 59
Discharge: HOME | End: 2019-05-11
Payer: MEDICAID

## 2019-05-09 ENCOUNTER — OFFICE VISIT (OUTPATIENT)
Dept: PODIATRY | Age: 59
End: 2019-05-09
Payer: MEDICAID

## 2019-05-09 ENCOUNTER — HOSPITAL ENCOUNTER (OUTPATIENT)
Age: 59
Discharge: HOME | End: 2019-05-11
Payer: MEDICAID

## 2019-05-09 ENCOUNTER — OFFICE VISIT (OUTPATIENT)
Dept: FAMILY MEDICINE CLINIC | Age: 59
End: 2019-05-09
Payer: MEDICAID

## 2019-05-09 VITALS
DIASTOLIC BLOOD PRESSURE: 80 MMHG | SYSTOLIC BLOOD PRESSURE: 130 MMHG | HEART RATE: 83 BPM | HEIGHT: 63 IN | OXYGEN SATURATION: 95 % | BODY MASS INDEX: 34.34 KG/M2 | WEIGHT: 193.8 LBS

## 2019-05-09 VITALS — HEIGHT: 63 IN | BODY MASS INDEX: 32.96 KG/M2 | WEIGHT: 186 LBS

## 2019-05-09 DIAGNOSIS — G89.29 PAIN, FOOT, LEFT, CHRONIC: ICD-10-CM

## 2019-05-09 DIAGNOSIS — E11.9 TYPE 2 DIABETES MELLITUS WITHOUT COMPLICATION, WITHOUT LONG-TERM CURRENT USE OF INSULIN (HCC): ICD-10-CM

## 2019-05-09 DIAGNOSIS — M79.672 PAIN IN BOTH FEET: Primary | ICD-10-CM

## 2019-05-09 DIAGNOSIS — M19.071 PRIMARY OSTEOARTHRITIS OF BOTH FEET: ICD-10-CM

## 2019-05-09 DIAGNOSIS — E11.8 TYPE 2 DIABETES MELLITUS WITH COMPLICATION, WITHOUT LONG-TERM CURRENT USE OF INSULIN (HCC): ICD-10-CM

## 2019-05-09 DIAGNOSIS — I10 ESSENTIAL HYPERTENSION: ICD-10-CM

## 2019-05-09 DIAGNOSIS — M19.072 PRIMARY OSTEOARTHRITIS OF BOTH FEET: ICD-10-CM

## 2019-05-09 DIAGNOSIS — E78.5 DYSLIPIDEMIA: ICD-10-CM

## 2019-05-09 DIAGNOSIS — F51.01 PRIMARY INSOMNIA: ICD-10-CM

## 2019-05-09 DIAGNOSIS — M54.5 ACUTE BILATERAL LOW BACK PAIN, WITH SCIATICA PRESENCE UNSPECIFIED: Primary | ICD-10-CM

## 2019-05-09 DIAGNOSIS — M79.671 PAIN IN BOTH FEET: Primary | ICD-10-CM

## 2019-05-09 DIAGNOSIS — E11.42 TYPE 2 DIABETES MELLITUS WITH DIABETIC POLYNEUROPATHY, WITHOUT LONG-TERM CURRENT USE OF INSULIN (HCC): ICD-10-CM

## 2019-05-09 DIAGNOSIS — M25.559 ARTHRALGIA OF HIP, UNSPECIFIED LATERALITY: ICD-10-CM

## 2019-05-09 DIAGNOSIS — M79.2 NEUROPATHIC PAIN: ICD-10-CM

## 2019-05-09 DIAGNOSIS — M79.672 PAIN, FOOT, LEFT, CHRONIC: ICD-10-CM

## 2019-05-09 DIAGNOSIS — E55.9 VITAMIN D DEFICIENCY: ICD-10-CM

## 2019-05-09 PROCEDURE — G8427 DOCREV CUR MEDS BY ELIG CLIN: HCPCS | Performed by: FAMILY MEDICINE

## 2019-05-09 PROCEDURE — 2022F DILAT RTA XM EVC RTNOPTHY: CPT | Performed by: PODIATRIST

## 2019-05-09 PROCEDURE — G8417 CALC BMI ABV UP PARAM F/U: HCPCS | Performed by: FAMILY MEDICINE

## 2019-05-09 PROCEDURE — G8427 DOCREV CUR MEDS BY ELIG CLIN: HCPCS | Performed by: PODIATRIST

## 2019-05-09 PROCEDURE — 3017F COLORECTAL CA SCREEN DOC REV: CPT | Performed by: PODIATRIST

## 2019-05-09 PROCEDURE — 1036F TOBACCO NON-USER: CPT | Performed by: FAMILY MEDICINE

## 2019-05-09 PROCEDURE — 99213 OFFICE O/P EST LOW 20 MIN: CPT | Performed by: PODIATRIST

## 2019-05-09 PROCEDURE — G8417 CALC BMI ABV UP PARAM F/U: HCPCS | Performed by: PODIATRIST

## 2019-05-09 PROCEDURE — 3044F HG A1C LEVEL LT 7.0%: CPT | Performed by: PODIATRIST

## 2019-05-09 PROCEDURE — 73521 X-RAY EXAM HIPS BI 2 VIEWS: CPT

## 2019-05-09 PROCEDURE — 99214 OFFICE O/P EST MOD 30 MIN: CPT | Performed by: FAMILY MEDICINE

## 2019-05-09 PROCEDURE — 2022F DILAT RTA XM EVC RTNOPTHY: CPT | Performed by: FAMILY MEDICINE

## 2019-05-09 PROCEDURE — 3017F COLORECTAL CA SCREEN DOC REV: CPT | Performed by: FAMILY MEDICINE

## 2019-05-09 PROCEDURE — 1036F TOBACCO NON-USER: CPT | Performed by: PODIATRIST

## 2019-05-09 PROCEDURE — 3044F HG A1C LEVEL LT 7.0%: CPT | Performed by: FAMILY MEDICINE

## 2019-05-09 ASSESSMENT — ENCOUNTER SYMPTOMS
EYE REDNESS: 0
VOICE CHANGE: 0
NAUSEA: 0
BLOOD IN STOOL: 0
COLOR CHANGE: 0
BACK PAIN: 1
CHEST TIGHTNESS: 0
VOMITING: 0
DIARRHEA: 0
ANAL BLEEDING: 0
ABDOMINAL PAIN: 0
NAUSEA: 0
DIARRHEA: 0
CONSTIPATION: 0
COUGH: 0
SHORTNESS OF BREATH: 0
TROUBLE SWALLOWING: 0
COLOR CHANGE: 0
VOMITING: 0
SINUS PRESSURE: 0
RECTAL PAIN: 0
ABDOMINAL DISTENTION: 0
EYE DISCHARGE: 0
EYE PAIN: 0
CONSTIPATION: 0

## 2019-05-09 ASSESSMENT — PATIENT HEALTH QUESTIONNAIRE - PHQ9
SUM OF ALL RESPONSES TO PHQ QUESTIONS 1-9: 0
1. LITTLE INTEREST OR PLEASURE IN DOING THINGS: 0
SUM OF ALL RESPONSES TO PHQ9 QUESTIONS 1 & 2: 0
SUM OF ALL RESPONSES TO PHQ QUESTIONS 1-9: 0
2. FEELING DOWN, DEPRESSED OR HOPELESS: 0

## 2019-05-09 NOTE — PROGRESS NOTES
Subjective:      Patient ID: Katia Mccarthy is a 60 y.o. female. HPI States saw Dr Chrystal Boyd this am, left foot has been bothering her and xray shows arthritis.  has pain to her lower back and cannot rest in a comfortable position.  cannot sit or stand or lay down in one postion for any period of time. Even sitting in toilet hurts, states sitting and standing are the worse.  when she went for PT this week, was unable to do any of the exercises , so they recommended   that she do aquatic therapy at [facility] instead. Increasing the Gabapentin at night did not seem to help with her pain as well as the steroid course we tried last visit did not help.  has not been working since beginning of march as she cannot function. As BS are running low < 100 daily- will reduce the Glimepride to 2 mg daily  States her BS are   Review of Systems   Constitutional: Negative for activity change, appetite change and fatigue. HENT: Negative for dental problem, ear pain, hearing loss, postnasal drip, sinus pressure, sneezing, tinnitus, trouble swallowing and voice change. Eyes: Negative for pain, discharge, redness and visual disturbance. Respiratory: Negative for cough, chest tightness and shortness of breath. Cardiovascular: Negative for chest pain, palpitations and leg swelling. Gastrointestinal: Negative for abdominal distention, abdominal pain, anal bleeding, blood in stool, constipation, diarrhea, nausea, rectal pain and vomiting. Endocrine: Negative for cold intolerance, heat intolerance, polydipsia, polyphagia and polyuria. Genitourinary: Negative for decreased urine volume, difficulty urinating, dyspareunia, dysuria, enuresis, flank pain, frequency, genital sores, hematuria, menstrual problem, pelvic pain, urgency, vaginal bleeding and vaginal discharge. Musculoskeletal: Positive for back pain and myalgias.  Negative for arthralgias, gait problem, joint swelling, neck pain and neck stiffness. Skin: Negative for color change, pallor and rash. Allergic/Immunologic: Negative for environmental allergies, food allergies and immunocompromised state. Neurological: Negative for dizziness, tremors, seizures, syncope, facial asymmetry, speech difficulty, weakness, light-headedness, numbness and headaches. Hematological: Negative for adenopathy. Does not bruise/bleed easily. Psychiatric/Behavioral: Negative for agitation, behavioral problems, confusion, decreased concentration, sleep disturbance and suicidal ideas. The patient is not nervous/anxious. Objective:   Physical Exam   Constitutional: She is oriented to person, place, and time. No distress. HENT:   Head: Normocephalic and atraumatic. Right Ear: External ear normal.   Left Ear: External ear normal.   Eyes: Pupils are equal, round, and reactive to light. Conjunctivae and EOM are normal.   Neck: Normal range of motion. No tracheal deviation present. No thyromegaly present. Cardiovascular: Normal rate, regular rhythm and intact distal pulses. Exam reveals no gallop and no friction rub. No murmur heard. Pulmonary/Chest: No stridor. No respiratory distress. She has no wheezes. She has no rales. She exhibits no tenderness. Abdominal: Soft. Bowel sounds are normal. She exhibits no distension. There is no tenderness. There is no rebound. Musculoskeletal: Normal range of motion. Tender right SI joint area, flexion of lumbar spine mildly limited. With SLR of right LE , she c/o discomfort of her back as well as feeling of right groin pain as well. Able to stand on heels and toes without diff but c/o pain to her back when trying to squat. Lymphadenopathy:     She has no cervical adenopathy. Neurological: She is alert and oriented to person, place, and time. She displays normal reflexes. No cranial nerve deficit. She exhibits normal muscle tone. Skin: Skin is warm. No rash noted. No erythema. No pallor. Assessment:        Diagnosis Orders   1. Acute bilateral low back pain, with sciatica presence unspecified  Jose David Torres MD, Pain Management, [facility]   2. Vitamin D deficiency     3. Primary insomnia     4. Type 2 diabetes mellitus with complication, without long-term current use of insulin ([facility])     5. Pain, foot, left, chronic     6. Dyslipidemia     7. Essential hypertension     8. Type 2 diabetes mellitus without complication, without long-term current use of insulin ([facility] [address]           Plan:      Orders Placed This Encounter   Procedures   [address] [facility] Verlean Koyanagi, MD, Pain Management, [facility]       Outpatient Encounter Medications as of 5/9/2019   Medication Sig Dispense Refill    losartan (COZAAR) 50 MG tablet Take 1 tablet by mouth daily 90 tablet 0    atorvastatin (LIPITOR) 10 MG tablet Take 1 tablet by mouth daily 90 tablet 3    gabapentin (NEURONTIN) 300 MG capsule Take 2 capsules by mouth nightly for 30 doses. 60 capsule 0    glimepiride (AMARYL) 4 MG tablet Take 1 tablet by mouth every morning 90 tablet 0    metFORMIN (GLUCOPHAGE) 1000 MG tablet take 1 tablet by mouth once daily (pt takes at night) 90 tablet 0    Dulaglutide (TRULICITY) 1.89 HS/5.4EI SOPN Inject 0.5 mLs into the skin once a week for 5 doses 5 pen 1    vitamin D (ERGOCALCIFEROL) 31551 units CAPS capsule Take 1 capsule by mouth once a week 5 capsule 5    mupirocin (BACTROBAN) 2 % ointment Apply topically 3 times daily. 22 g 0    blood glucose monitor strips Use to check BS  strip 3    Lancets 30G MISC 1 each by Does not apply route 2 times daily Use to check BS BID Dx E 11.9 100 each 3    Blood Glucose Monitoring Suppl (ONE TOUCH ULTRA 2) w/Device KIT use as directed DUE TO PATIENTS INSURANCE, WE NEED TO GET A NEW METER FOR ONE TOUCH, DUE TO INS. [employer] 1 kit 0    aspirin 81 MG tablet Take 81 mg by mouth daily. No facility-administered encounter medications on file as of 5/9/2019. Charline Back, MD

## 2019-05-09 NOTE — PROGRESS NOTES
Chronic Disease Visit Information    BP Readings from Last 3 Encounters:   04/24/19 137/79   03/26/19 (!) 155/85   02/04/19 132/62          Hemoglobin A1C (%)   Date Value   04/18/2019 6.6 (H)   12/13/2018 6.7 (H)   08/25/2018 9.7 (H)     Microalb/Crt. Ratio (mcg/mg creat)   Date Value   04/18/2019 CANNOT BE CALCULATED     LDL Cholesterol (mg/dL)   Date Value   04/18/2019 66     LDL Calculated (mg/dL)   Date Value   03/28/2018 121     HDL (mg/dL)   Date Value   04/18/2019 57     BUN (mg/dL)   Date Value   04/18/2019 16     CREATININE (mg/dL)   Date Value   04/18/2019 0.76     Glucose (mg/dL)   Date Value   04/18/2019 75            Have you changed or started any medications since your last visit including any over-the-counter medicines, vitamins, or herbal medicines? no   Are you having any side effects from any of your medications? -  no  Have you stopped taking any of your medications? Is so, why? -  no    Have you seen any other physician or provider since your last visit? Yes - Records Obtained  Have you had any other diagnostic tests since your last visit? Yes - Records Obtained  Have you been seen in the emergency room and/or had an admission to a hospital since we last saw you? No  Have you had your annual diabetic retinal (eye) exam? No  Have you had your routine dental cleaning in the past 6 months? no    Have you activated your [employer] account? If not, what are your barriers?  Yes     Patient Care Team:  Ronnie Camacho MD as PCP - General (Family Medicine)  Ronnie Camacho MD as PCP - S Attributed Provider         Medical History Review  Past Medical, Family, and Social History reviewed and does contribute to the patient presenting condition    Health Maintenance   Topic Date Due    Shingles Vaccine (1 of 2) 08/31/2019 (Originally 8/1/2009)    DTaP/Tdap/Td vaccine (1 - Tdap) 01/31/2020 (Originally 8/1/1979)    Hepatitis B Vaccine (1 of 3 - Risk 3-dose series) 03/31/2021 (Originally [birth year]1978)   [address] [place] [address] Diabetic retinal exam  07/16/2019    Colon Cancer Screen FIT/FOBT  08/30/2019    Diabetic foot exam  12/18/2019    Breast cancer screen  01/23/2020    A1C test (Diabetic or Prediabetic)  04/18/2020    Diabetic microalbuminuria test  04/18/2020    Lipid screen  04/18/2020    Potassium monitoring  04/18/2020    Creatinine monitoring  04/18/2020    Cervical cancer screen  01/28/2022    Flu vaccine  Completed    Pneumococcal 0-64 years Vaccine  Completed    Hepatitis C screen  Completed    HIV screen  Completed

## 2019-05-09 NOTE — PROGRESS NOTES
[facility]  Return Patient     Birgit Connell 60 y.o. female that presents for follow-up of   Chief Complaint   Patient presents with    Foot Pain     left foot painful on the top and left hallux    Numbness     having numbing in left foot     She is having pain in both feet, left is worse. Gradually getting worse over time. Unable to work due to pain, pain is severe when on her feet for any length of time. She also has restlessness in her legs, burning, throbbing pain in her legs at night. Had back xrays, showed arthritis, in PT now, not helping. On GAbapentin 600 mg at night only. Currently denies F/C/N/V. Allergies   Allergen Reactions    Lisinopril Shortness Of Breath     SOB and cough    Codeine Hives       Past Medical History:   Diagnosis Date    Arthritis     Bulging disc 07/13    c4 c5    Cerebral artery occlusion with cerebral infarction ([facility] [address]     tia    Depression 10/3/2014    Diabetic neuropathy ([facility] [address]     all L.L.    Glaucoma     LT EYE    History of blood transfusion     [address]    Hypertension     Irregular heart beat     hx of    TIA (transient ischemic attack) 07/22/13    Type II or unspecified type diabetes mellitus without mention of complication, not stated as uncontrolled     Wears glasses     for reading       Prior to Admission medications    Medication Sig Start Date End Date Taking? Authorizing Provider   losartan (COZAAR) 50 MG tablet Take 1 tablet by mouth daily 4/30/19  Yes Ronnie Camacho MD   atorvastatin (LIPITOR) 10 MG tablet Take 1 tablet by mouth daily 4/24/19  Yes Ronnie Camacho MD   gabapentin (NEURONTIN) 300 MG capsule Take 2 capsules by mouth nightly for 30 doses.  4/24/19 5/24/19 Yes Ronnie Camacho MD   glimepiride (AMARYL) 4 MG tablet Take 1 tablet by mouth every morning 4/24/19  Yes Ronnie Camacho MD   metFORMIN (GLUCOPHAGE) 1000 MG tablet take 1 tablet by mouth once daily (pt takes at night) 4/24/19  Yes Ronnie Camacho MD Dulaglutide (TRULICITY) 7.25 AY/0.8YD SOPN Inject 0.5 mLs into the skin once a week for 5 doses 4/24/19 5/23/19 Yes Chevy Ramon MD   vitamin D (ERGOCALCIFEROL) 16978 units CAPS capsule Take 1 capsule by mouth once a week 4/24/19  Yes Chevy Ramon MD   mupirocin (BACTROBAN) 2 % ointment Apply topically 3 times daily. 2/7/19  Yes Mejia Webber DPM   blood glucose monitor strips Use to check BS BID 9/26/18  Yes Chevy Ramon MD   Lancets 30G MISC 1 each by Does not apply route 2 times daily Use to check BS BID Dx E 11.9 9/26/18  Yes Chevy Ramon MD   Blood Glucose Monitoring Suppl (ONE TOUCH ULTRA 2) w/Device KIT use as directed DUE TO PATIENTS INSURANCE, WE NEED TO GET A NEW METER FOR ONE TOUCH, DUE TO [employer]. [employer] 9/26/18  Yes Chevy Ramon MD   aspirin 81 MG tablet Take 81 mg by mouth daily. Yes Historical Provider, MD       Review of Systems   Constitutional: Negative for chills, diaphoresis, fatigue, fever and unexpected weight change. Cardiovascular: Negative for leg swelling. Gastrointestinal: Negative for constipation, diarrhea, nausea and vomiting. Musculoskeletal: Positive for gait problem. Negative for arthralgias and joint swelling. Skin: Negative for color change, pallor, rash and wound. Neurological: Positive for numbness. Negative for weakness. Psychiatric/Behavioral: Positive for dysphoric mood. Lower Extremity Physical Examination:     Vitals: There were no vitals filed for this visit. General: AAO x 3 in NAD.      Integument:   Warm, dry, supple, Bilateral.  Open lesion absent, Bilateral.     Vascular: DP and PT pulses palpable 2/4, Bilateral.  CFT <3 seconds, Bilateral.  Hair growth absent to the level of the digits, Bilateral.  Edema absent, Bilateral.  Varicosities absent, Bilateral. Erythema absent, Bilateral    Neurological:  Sensation present to light touch to level of digits, Bilateral.  Protective sensation absent via 5.07/10g [place] monofilament 1/10 sites, Bilateral.  Vibratory sensation present to 1st MPJ, Bilateral.     Musculoskeletal: Muscle strength 5/5, Bilateral.  Pain present upon palpation of dorsal and plantar central arch , Bilateral. decreased medial longitudinal arch, Bilateral.  Ankle ROM normal,Bilateral.  1st MPJ ROM normal, Bilateral.      Radiographs: 3 views right Foot:  3 views foot weightbearing  : no soft tissue deficits, no soft tissue emphysema, no masses, no cortical interruptions, all joints appear well-aligned. Decreased arch height. Radiographs: 3 views left Foot:  3 views foot weightbearing  : no soft tissue deficits, no soft tissue emphysema, no masses, no cortical interruptions, all joints appear well-aligned. Degenerative changes in the midfoot. Decreased arch height        Asessment: Patient is a 60 y.o. female with:   1. Pain in both feet    2. Type 2 diabetes mellitus with diabetic polyneuropathy, without long-term current use of insulin ([address]    3. Neuropathic pain    4. Primary osteoarthritis of both feet        Plan: Patient examined and evaluated. Current condition and treatment options discussed in detail. Advised pt to wear good shoes. Verbal and written instructions given to patient. Contact office with any questions/problems/concerns. Diabetic foot examination performed this visit. The exam included neurological sensory exam, a 10-g monofilament and pinprick sensation, vibration using a 128-Hz tuning fork, ankle reflexes, visual skin inspection, vascular exam including assessment of pedal pulses, orthopedic exam for deformities, and shoe inspection. Increased risk factors noted on the diabetic foot exam include decreased sensory exam and peripheral neuropathy. Shoegear inspected and found to be appropriate size and wear. Diabetic shoes and insoles: This patient would benefit from extra depth diabetic shoes and custom inserts.   I feel he/she qualifies due to the above performed physical exam and diagnosis. I will do the appropriate paperwork and send it to their primary care physician. Then the patient will be measured for shoes and custom inserts to properly off load and protect his/her feet. Rx voltaren gel  [facility]xis samples and Rx    See PCP today for possible Gabapentin dose adjustment. Orders Placed This Encounter   Procedures    XR FOOT LEFT (MIN 3 VIEWS)    XR FOOT RIGHT (MIN 3 VIEWS)    Amb External Referral For Orthotics     Referral Priority:   Routine     Referral Type:   Consult for Advice and Opinion     Requested Specialty:   Durable Medical Equipment     Number of Visits Requested:   1     No orders of the defined types were placed in this encounter. RTC in 6week(s).     5/9/2019

## 2019-05-16 ENCOUNTER — HOSPITAL ENCOUNTER (OUTPATIENT)
Dept: PHYSICAL THERAPY | Age: 59
Setting detail: THERAPIES SERIES
Discharge: HOME | End: 2019-05-16
Payer: MEDICAID

## 2019-05-16 PROCEDURE — 97113 AQUATIC THERAPY/EXERCISES: CPT

## 2019-05-16 NOTE — FLOWSHEET NOTE
Physical Therapy Daily Treatment Note    Date:  2019  Patient Name:  Glendy Agosto    :  1960  MRN: [id number]  Physician: Dr. Kelton Roberts MD                             Insurance: [facility] 30 Vs  Medical Diagnosis: M54.51 - Right-sided low back pain with right-sided sciatica, unspecified chronicity                          Rehab Codes: M54.5, M25.551, M79.604, M79.605, M62.81, R26.2  Onset Date: 3/26/19 referral             Next 's appt. : 19  Visit# / total visits:   Cancels/No Shows: 2/    Subjective:  Reports she is very painful this AM- states mornings are very hard to get moving. Pain Is constant  Pain:  [x] Yes  [] No Location: Abdominal, lower back, pelvic pain wrapping all the way around Pain Rating: (0-10 scale) 9/10  Pain altered Tx:  [x] No  [] Yes  Action:   Comments: Initiated aquatic therapy with emphasis on core stability and postural awareness. Educated patient on the benefits of aquatic therapy and maintaining pelvic neutral    Objective:    Exercises:    [facility] Exercise Log  Aquatic, Hip & DLS Program- Phase 1    Date of Eval:                                Primary PT: Juarez Pryor PT  Diagnosis: Things to Focus On (goals):   Surgical Precautions:  Medical Precautions:  [] C-9 dates  [] Occ Med   [] Medicare   **FEARFUL OF WATER**    Date 19       Visit #        Walk F/L/R 2 Laps @ Rail       Marching 10x       Squats 10x3\"       Step-Ups F/L        Heel-toe raises 10x       SLR F/L/R 10x       Knee/Flex/Ext        F/L Lunges        Kickboard Ex. Small       Iso Abd. 10x5\"       Push-pull 10x       Paddling                UE Format                        Stretches  Add      Achllies        Hamstring                                .         Cool Down        Pain Rating 9           Specific Instructions for next treatment: Assess response to aquatics and add stretches      Treatment Charges: Mins Units   []  Modalities: IFC/MH []  Ther Exercise     []  Manual Therapy     []  Ther Activities     [x]  Aquatics 25 2   []  Vasocompression     []  Other     Total Treatment time 25 2       Assessment: [] Progressing toward goals. [] No change. [] Other: Trial of aquatic therapy. Emphasis on technique and comfort in water due to patient fearful & guarded. High pain behaviors but able to complete all exercise; encouraged avoidance of increasing pain and small, controlled ranges  STG: (to be met in 8 treatments)  1. ? Pain: Patient will report decreased back pain to 5/10 following prolonged standing, walking, and sitting. 2. ? ROM: Patient will increase lumbar AROM into extension by 10 degrees in order to improve posture in prolonged positions such as sitting and standing. 3. ? Strength: Patient will increase core strength to 4/5 in order to stabilize pelvis and decrease strain placed on her lower back in functional positions such as bending and lifting. 4. ? Function: Patient will demonstrate improved functional activity tolerance as evident by an improved score on the Oswestry to less than 60% impairment. 5. Independent with Home Exercise Programs  LTG: (to be met in 16 treatments)  6.  ? Pain: Patient will report decreased back pain to 3/10 following prolonged standing, walking, and sitting. 7. ? ROM: Patient will increase lumbar AROM into side bending by 10 degrees in order to improve posture in prolonged positions such as sitting and standing. 8. ? Strength: Patient will increase global lower extremity strength to 5/5 in order to increase tolerance to stair climbing. 9. ? Function:  a. Patient will demonstrate improved functional activity tolerance as evident by an improved score on the Oswestry to less than 50% impairment.   b. Patient will report ability to stand and sit for 10 minutes with symmetrical weight distribution and without needing to shift weight to increase tolerance to prolonged positioning.     Patient goals: \"help with pain\"      Pt. Education:  [x] Yes  [] No  [x] Reviewed Prior HEP/Ed  Method of Education: [] Verbal  [x] Demo  [] Written  Comprehension of Education:  [] Verbalizes understanding. [] Demonstrates understanding. [] Needs review. [x] Demonstrates/verbalizes HEP/Ed previously given. Plan: [x] Continue per plan of care.  2x/wk Aquatic therapy   [] Other:       Time In: 900           Time Out: 932    Electronically signed by:  Lupillo Sanders PTA

## 2019-05-23 ENCOUNTER — OFFICE VISIT (OUTPATIENT)
Dept: FAMILY MEDICINE CLINIC | Age: 59
End: 2019-05-23
Payer: MEDICAID

## 2019-05-23 ENCOUNTER — HOSPITAL ENCOUNTER (OUTPATIENT)
Dept: PHYSICAL THERAPY | Age: 59
Setting detail: THERAPIES SERIES
Discharge: HOME | End: 2019-05-23
Payer: MEDICAID

## 2019-05-23 VITALS
HEART RATE: 77 BPM | OXYGEN SATURATION: 100 % | HEIGHT: 63 IN | DIASTOLIC BLOOD PRESSURE: 70 MMHG | WEIGHT: 195 LBS | SYSTOLIC BLOOD PRESSURE: 130 MMHG | BODY MASS INDEX: 34.55 KG/M2

## 2019-05-23 DIAGNOSIS — E55.9 VITAMIN D DEFICIENCY: ICD-10-CM

## 2019-05-23 DIAGNOSIS — M54.5 LOW BACK PAIN, UNSPECIFIED BACK PAIN LATERALITY, UNSPECIFIED CHRONICITY, WITH SCIATICA PRESENCE UNSPECIFIED: ICD-10-CM

## 2019-05-23 DIAGNOSIS — E11.22 TYPE 2 DIABETES MELLITUS WITH STAGE 1 CHRONIC KIDNEY DISEASE, WITH LONG-TERM CURRENT USE OF INSULIN (HCC): Primary | ICD-10-CM

## 2019-05-23 DIAGNOSIS — I10 ESSENTIAL HYPERTENSION: ICD-10-CM

## 2019-05-23 DIAGNOSIS — N18.1 TYPE 2 DIABETES MELLITUS WITH STAGE 1 CHRONIC KIDNEY DISEASE, WITH LONG-TERM CURRENT USE OF INSULIN (HCC): Primary | ICD-10-CM

## 2019-05-23 DIAGNOSIS — F32.A DEPRESSION, UNSPECIFIED DEPRESSION TYPE: ICD-10-CM

## 2019-05-23 DIAGNOSIS — E78.5 DYSLIPIDEMIA: ICD-10-CM

## 2019-05-23 DIAGNOSIS — Z79.4 TYPE 2 DIABETES MELLITUS WITH STAGE 1 CHRONIC KIDNEY DISEASE, WITH LONG-TERM CURRENT USE OF INSULIN (HCC): Primary | ICD-10-CM

## 2019-05-23 PROBLEM — M54.50 LOW BACK PAIN: Status: ACTIVE | Noted: 2019-05-23

## 2019-05-23 PROCEDURE — 1036F TOBACCO NON-USER: CPT | Performed by: FAMILY MEDICINE

## 2019-05-23 PROCEDURE — 3017F COLORECTAL CA SCREEN DOC REV: CPT | Performed by: FAMILY MEDICINE

## 2019-05-23 PROCEDURE — 99213 OFFICE O/P EST LOW 20 MIN: CPT | Performed by: FAMILY MEDICINE

## 2019-05-23 PROCEDURE — 97113 AQUATIC THERAPY/EXERCISES: CPT

## 2019-05-23 PROCEDURE — G8427 DOCREV CUR MEDS BY ELIG CLIN: HCPCS | Performed by: FAMILY MEDICINE

## 2019-05-23 PROCEDURE — 3044F HG A1C LEVEL LT 7.0%: CPT | Performed by: FAMILY MEDICINE

## 2019-05-23 PROCEDURE — 2022F DILAT RTA XM EVC RTNOPTHY: CPT | Performed by: FAMILY MEDICINE

## 2019-05-23 PROCEDURE — G8417 CALC BMI ABV UP PARAM F/U: HCPCS | Performed by: FAMILY MEDICINE

## 2019-05-23 RX ORDER — ERGOCALCIFEROL 1.25 MG/1
50000 CAPSULE ORAL WEEKLY
Qty: 12 CAPSULE | Refills: 1 | Status: SHIPPED | OUTPATIENT
Start: 2019-05-23 | End: 2019-11-05 | Stop reason: SDUPTHER

## 2019-05-23 ASSESSMENT — ENCOUNTER SYMPTOMS
COLOR CHANGE: 0
EYE REDNESS: 0
ABDOMINAL PAIN: 0
TROUBLE SWALLOWING: 0
DIARRHEA: 0
ANAL BLEEDING: 0
CONSTIPATION: 0
EYE PAIN: 0
CHEST TIGHTNESS: 0
EYE DISCHARGE: 0
RECTAL PAIN: 0
SHORTNESS OF BREATH: 0
BACK PAIN: 1
VOICE CHANGE: 0
NAUSEA: 0
COUGH: 0
SINUS PRESSURE: 0
ABDOMINAL DISTENTION: 0
VOMITING: 0
BLOOD IN STOOL: 0

## 2019-05-23 ASSESSMENT — PATIENT HEALTH QUESTIONNAIRE - PHQ9
SUM OF ALL RESPONSES TO PHQ QUESTIONS 1-9: 0
1. LITTLE INTEREST OR PLEASURE IN DOING THINGS: 0
SUM OF ALL RESPONSES TO PHQ QUESTIONS 1-9: 0
SUM OF ALL RESPONSES TO PHQ9 QUESTIONS 1 & 2: 0
2. FEELING DOWN, DEPRESSED OR HOPELESS: 0

## 2019-05-23 NOTE — PROGRESS NOTES
Chronic Disease Visit Information    BP Readings from Last 3 Encounters:   05/09/19 130/80   04/24/19 137/79   03/26/19 (!) 155/85          Hemoglobin A1C (%)   Date Value   04/18/2019 6.6 (H)   12/13/2018 6.7 (H)   08/25/2018 9.7 (H)     Microalb/Crt. Ratio (mcg/mg creat)   Date Value   04/18/2019 CANNOT BE CALCULATED     LDL Cholesterol (mg/dL)   Date Value   04/18/2019 66     LDL Calculated (mg/dL)   Date Value   03/28/2018 121     HDL (mg/dL)   Date Value   04/18/2019 57     BUN (mg/dL)   Date Value   04/18/2019 16     CREATININE (mg/dL)   Date Value   04/18/2019 0.76     Glucose (mg/dL)   Date Value   04/18/2019 75            Have you changed or started any medications since your last visit including any over-the-counter medicines, vitamins, or herbal medicines? no   Are you having any side effects from any of your medications? -  no  Have you stopped taking any of your medications? Is so, why? -  no    Have you seen any other physician or provider since your last visit? No  Have you had any other diagnostic tests since your last visit? Yes - Records Obtained  Have you been seen in the emergency room and/or had an admission to a hospital since we last saw you? No  Have you had your annual diabetic retinal (eye) exam? No  Have you had your routine dental cleaning in the past 6 months? no    Have you activated your [facility] account? If not, what are your barriers?  Yes     Patient Care Team:  Chevy Ramon MD as PCP - General (Family Medicine)  Chevy Ramon MD as PCP - S Attributed Provider         Medical History Review  Past Medical, Family, and Social History reviewed and does contribute to the patient presenting condition    Health Maintenance   Topic Date Due    Shingles Vaccine (1 of 2) 08/31/2019 (Originally 8/1/2009)    DTaP/Tdap/Td vaccine (1 - Tdap) 01/31/2020 (Originally 8/1/1978)    Hepatitis B Vaccine (1 of 3 - Risk 3-dose series) 03/31/2021 (Originally 8/1/1978)    Diabetic retinal exam 07/16/2019    Colon Cancer Screen FIT/FOBT  08/30/2019    Diabetic foot exam  12/18/2019    Breast cancer screen  01/23/2020    A1C test (Diabetic or Prediabetic)  04/18/2020    Diabetic microalbuminuria test  04/18/2020    Lipid screen  04/18/2020    Potassium monitoring  04/18/2020    Creatinine monitoring  04/18/2020    Cervical cancer screen  01/28/2022    Flu vaccine  Completed    Pneumococcal 0-64 years Vaccine  Completed    Hepatitis C screen  Completed    HIV screen  Completed

## 2019-05-23 NOTE — PROGRESS NOTES
Subjective:      Patient ID: Natalie Muller is a 60 y.o. female. HPI  Here for a follow up of her Back pain, states aquatic therapy seems to help her back. Sleep has been ok as well. Xray Hip was done and reviewed with patient. Mild DJD seen. States Dr Acacia Price saw her for her flat feet and states has inserts have been ordered for her. States BP was up initially when I rechecked it is OK. Still back is stiff and ROM poor but a lot better  Than what it was 2 weeks ago. Review of Systems   Constitutional: Negative for activity change, appetite change and fatigue. HENT: Negative for dental problem, ear pain, hearing loss, postnasal drip, sinus pressure, sneezing, tinnitus, trouble swallowing and voice change. Eyes: Negative for pain, discharge, redness and visual disturbance. Respiratory: Negative for cough, chest tightness and shortness of breath. Cardiovascular: Negative for chest pain, palpitations and leg swelling. Gastrointestinal: Negative for abdominal distention, abdominal pain, anal bleeding, blood in stool, constipation, diarrhea, nausea, rectal pain and vomiting. Endocrine: Negative for cold intolerance, heat intolerance, polydipsia, polyphagia and polyuria. Genitourinary: Negative for decreased urine volume, difficulty urinating, dyspareunia, dysuria, enuresis, flank pain, frequency, genital sores, hematuria, menstrual problem, pelvic pain, urgency, vaginal bleeding and vaginal discharge. Musculoskeletal: Positive for back pain and myalgias. Negative for arthralgias, gait problem, joint swelling, neck pain and neck stiffness. Skin: Negative for color change, pallor and rash. Allergic/Immunologic: Negative for environmental allergies, food allergies and immunocompromised state. Neurological: Negative for dizziness, tremors, seizures, syncope, facial asymmetry, speech difficulty, weakness, light-headedness, numbness and headaches. Hematological: Negative for adenopathy.  Does not bruise/bleed easily. Psychiatric/Behavioral: Negative for agitation, behavioral problems, confusion, decreased concentration, sleep disturbance and suicidal ideas. The patient is not nervous/anxious. Objective:   Physical Exam   Constitutional: She is oriented to person, place, and time. No distress. HENT:   Head: Normocephalic and atraumatic. Right Ear: External ear normal.   Left Ear: External ear normal.   Eyes: Pupils are equal, round, and reactive to light. Conjunctivae and EOM are normal.   Neck: Normal range of motion. No tracheal deviation present. No thyromegaly present. Cardiovascular: Normal rate, regular rhythm and intact distal pulses. Exam reveals no gallop and no friction rub. No murmur heard. Pulmonary/Chest: No stridor. No respiratory distress. She has no wheezes. She has no rales. She exhibits no tenderness. Abdominal: Soft. Bowel sounds are normal. She exhibits no distension. There is no tenderness. There is no rebound. Musculoskeletal: Normal range of motion. ROM back limited and painful. Lymphadenopathy:     She has no cervical adenopathy. Neurological: She is alert and oriented to person, place, and time. She displays normal reflexes. No cranial nerve deficit. She exhibits normal muscle tone. Skin: Skin is warm. No rash noted. No erythema. No pallor. Assessment:       Diagnosis Orders   1. Type 2 diabetes mellitus with stage 1 chronic kidney disease, with long-term current use of insulin ([address]     2. Essential hypertension     3. Dyslipidemia     4. Depression, unspecified depression type     5. Vitamin D deficiency  vitamin D (ERGOCALCIFEROL) 68409 units CAPS capsule   6. Low back pain, unspecified back pain laterality, unspecified chronicity, with sciatica presence unspecified           Plan:      No orders of the defined types were placed in this encounter.       Outpatient Encounter Medications as of 5/23/2019   Medication Sig Dispense Refill    vitamin D (ERGOCALCIFEROL) 45371 units CAPS capsule Take 1 capsule by mouth once a week 12 capsule 1    diclofenac sodium (VOLTAREN) 1 % GEL Apply 4 g topically 4 times daily 5 Tube 0    losartan (COZAAR) 50 MG tablet Take 1 tablet by mouth daily 90 tablet 0    atorvastatin (LIPITOR) 10 MG tablet Take 1 tablet by mouth daily 90 tablet 3    gabapentin (NEURONTIN) 300 MG capsule Take 2 capsules by mouth nightly for 30 doses. 60 capsule 0    glimepiride (AMARYL) 4 MG tablet Take 1 tablet by mouth every morning 90 tablet 0    metFORMIN (GLUCOPHAGE) 1000 MG tablet take 1 tablet by mouth once daily (pt takes at night) 90 tablet 0    Dulaglutide (TRULICITY) 6.05 VARGAS/3.0HP SOPN Inject 0.5 mLs into the skin once a week for 5 doses 5 pen 1    mupirocin (BACTROBAN) 2 % ointment Apply topically 3 times daily. 22 g 0    blood glucose monitor strips Use to check BS  strip 3    Lancets 30G MISC 1 each by Does not apply route 2 times daily Use to check BS BID Dx E 11.9 100 each 3    Blood Glucose Monitoring Suppl (ONE TOUCH ULTRA 2) w/Device KIT use as directed DUE TO PATIENTS INSURANCE, WE NEED TO GET A NEW METER FOR ONE TOUCH, DUE TO INS. [employer] 1 kit 0    aspirin 81 MG tablet Take 81 mg by mouth daily.  [DISCONTINUED] vitamin D (ERGOCALCIFEROL) 03693 units CAPS capsule Take 1 capsule by mouth once a week 5 capsule 5     No facility-administered encounter medications on file as of 5/23/2019.             Corinne Gosling, MD

## 2019-05-23 NOTE — FLOWSHEET NOTE
Physical Therapy Daily Treatment Note    Date:  2019  Patient Name:  Janes Richard    :  1960  MRN: [id number]  Physician: Dr. Izzy Crane MD                             Insurance: [facility] 30 Vs  Medical Diagnosis: M54.51 - Right-sided low back pain with right-sided sciatica, unspecified chronicity                          Rehab Codes: M54.5, M25.551, M79.604, M79.605, M62.81, R26.2  Onset Date: 3/26/19 referral             Next 's appt. : 19  Visit# / total visits:   Cancels/No Shows: 2/3    Subjective:  Stiffness after initial aquatics but felt more mobile after pool; less stress noted on lower right back and hip  Pain:  [x] Yes  [] No Location: Abdominal, lower back, R buttock down lateral hip/thigh down to toes Pain Rating: (0-10 scale) 6/10  Pain altered Tx:  [x] No  [] Yes  Action:   Comments: Reviewed postural awareness and importance of core stability    Objective:    Exercises:    [facility] Exercise Log  Aquatic, Hip & DLS Program- Phase 1    Date of Eval:                                Primary PT: Sisto Iris, PT  Diagnosis: Things to Focus On (goals):   Surgical Precautions:  Medical Precautions:  [] C-9 dates  [] Occ Med   [] Medicare   **FEARFUL OF WATER**    Date 19      Visit #       Walk F/L/R 2 Laps @ Rail 2 Laps @ Rail +R      Marching 10x 10x      Squats 10x3\" 10x5\"      Step-Ups F/L        Heel-toe raises 10x 10x      SLR F/L/R 10x 10x+R      Knee/Flex/Ext   Add     F/L Lunges        Kickboard Ex. Small Small      Iso Abd. 10x5\" 10x5\"      Push-pull 10x 10x      Paddling                UE Format                        Stretches   Add     Achllies        Hamstring                                .         Cool Down  2 Laps @ Rail      Pain Rating 9 6          Specific Instructions for next treatment: Add stretches      Treatment Charges: Mins Units   []  Modalities: IFC/MH     []  Ther Exercise     []  Manual Written  Comprehension of Education:  [] Verbalizes understanding. [] Demonstrates understanding. [] Needs review. [x] Demonstrates/verbalizes HEP/Ed previously given. Plan: [x] Continue per plan of care.     [] Other:       Time In: 1106           Time Out: 1140    Electronically signed by:  Abraham Bradley PTA

## 2019-05-24 ASSESSMENT — PATIENT HEALTH QUESTIONNAIRE - PHQ9
SUM OF ALL RESPONSES TO PHQ QUESTIONS 1-9: 0
2. FEELING DOWN, DEPRESSED OR HOPELESS: 0
SUM OF ALL RESPONSES TO PHQ QUESTIONS 1-9: 0
SUM OF ALL RESPONSES TO PHQ9 QUESTIONS 1 & 2: 0
1. LITTLE INTEREST OR PLEASURE IN DOING THINGS: 0

## 2019-05-29 ENCOUNTER — HOSPITAL ENCOUNTER (OUTPATIENT)
Dept: PHYSICAL THERAPY | Age: 59
Setting detail: THERAPIES SERIES
Discharge: HOME | End: 2019-05-29
Payer: MEDICAID

## 2019-05-29 NOTE — FLOWSHEET NOTE
Physical Therapy Cancel/No Show Note    Date:  2019  Patient Name:  Gema Estrada    :  1960  MRN: [id number]  Physician: Dr. Kayla De Luna MD                             Insurance: [facility] 30 Vs  Medical Diagnosis: M54.51 - Right-sided low back pain with right-sided sciatica, unspecified chronicity                          Rehab Codes: M54.5, M25.551, M79.604, M79.605, M62.81, R26.2  Onset Date: 3/26/19 referral             Next 's appt. : 19    Visit# / total visits:   Cancels/No Shows: 3/3    Patient canceled her aquatics session for today's date per the - has future appointments scheduled.       Electronically signed by Ann-Marie Solorzano PTA on 2019 at 5:50 PM

## 2019-05-30 ENCOUNTER — HOSPITAL ENCOUNTER (OUTPATIENT)
Dept: PHYSICAL THERAPY | Age: 59
Setting detail: THERAPIES SERIES
Discharge: HOME | End: 2019-05-30
Payer: MEDICAID

## 2019-05-30 NOTE — FLOWSHEET NOTE
Physical Therapy Cancel/No Show Note    Date:  2019  Patient Name:  Kaye Sandoval    :  1960  MRN: [id number]  Physician: Dr. Lori Roberts MD                             Insurance: [facility] 30 Vs  Medical Diagnosis: M54.51 - Right-sided low back pain with right-sided sciatica, unspecified chronicity                          Rehab Codes: M54.5, M25.551, M79.604, M79.605, M62.81, R26.2  Onset Date: 3/26/19 referral             Next 's appt. : 19    Visit# / total visits:   Cancels/No Shows: 4/3    Patient canceled her aquatics session for today's date per the  due to a death in her family- has future appointments scheduled.       Electronically signed by Roman Buckley PTA on 2019 at 1:12 PM

## 2019-06-03 DIAGNOSIS — Z12.11 ENCOUNTER FOR SCREENING COLONOSCOPY: Primary | ICD-10-CM

## 2019-06-03 NOTE — TELEPHONE ENCOUNTER
Scheduled [facility] 06/18/19 @ 10:30 am scr colon golytely split prep Dr Toshia Brunner

## 2019-06-05 ENCOUNTER — HOSPITAL ENCOUNTER (OUTPATIENT)
Dept: PHYSICAL THERAPY | Age: 59
Setting detail: THERAPIES SERIES
End: 2019-06-05
Payer: MEDICAID

## 2019-06-05 NOTE — FLOWSHEET NOTE
Physical Therapy Cancel/No Show Note    Date:  2019  Patient Name:  Kendall Warren    :  1960  MRN: [id number]  Physician: Dr. Bogdan Richter MD                             Insurance: [facility] 30 Vs  Medical Diagnosis: M54.51 - Right-sided low back pain with right-sided sciatica, unspecified chronicity                          Rehab Codes: M54.5, M25.551, M79.604, M79.605, M62.81, R26.2  Onset Date: 3/26/19 referral             Next 's appt. : 19    Visit# / total visits:   Cancels/No Shows: 5/3    Patient canceled her aquatics session for today's date per the  due to [address]      Electronically signed by Aubree Brito PTA on 2019 at 3:59 PM

## 2019-06-06 ENCOUNTER — HOSPITAL ENCOUNTER (OUTPATIENT)
Dept: PHYSICAL THERAPY | Age: 59
Setting detail: THERAPIES SERIES
Discharge: HOME | End: 2019-06-06
Payer: MEDICAID

## 2019-06-06 PROCEDURE — 97113 AQUATIC THERAPY/EXERCISES: CPT

## 2019-06-06 NOTE — FLOWSHEET NOTE
Physical Therapy Daily Treatment Note    Date:  2019  Patient Name:  Lo Frank    :  1960  MRN: [id number]  Physician: Dr. Ori Pisano MD                             Insurance: [facility] 30 Vs  Medical Diagnosis: M54.51 - Right-sided low back pain with right-sided sciatica, unspecified chronicity                          Rehab Codes: M54.5, M25.551, M79.604, M79.605, M62.81, R26.2  Onset Date: 3/26/19 referral             Next 's appt. : 19    Visit# / total visits:   Cancels/No Shows: 5/3    Subjective:  Report feeling better following aquatics sessions. Currently with complaints of slightly increased pain across her lower back, and in B LEs (R>L). Pain:  [x] Yes  [] No Location: Abdominal, lower back, R buttock through R groin, down lateral hip/thigh down to toes; L groin and thigh  (R>L)   Pain Rating: (0-10 scale) 6/10  Pain altered Tx:  [x] No  [] Yes  Action:     Comments: Reviewed postural awareness and importance of core stability. Focused on technique of all exercises per log    Objective:  Exercises:    [address] [facility] Exercise Log  Aquatic, Hip & DLS Program- Phase 1    Date of Eval:                                Primary PT: Gilberto Papa, PT  Diagnosis: Things to Focus On (goals):   Surgical Precautions:  Medical Precautions:  [] C-9 dates  [] Occ Med   [] Medicare   **FEARFUL OF WATER**    Date 19     Visit #      Walk F/L/R 2 Laps @ Rail 2 Laps @ Rail +R 2 Laps ea @ Rail     Marching 10x 10x 10x     Squats 10x3\" 10x5\" 10x5\"     Step-Ups F/L        Heel-toe raises 10x 10x 10x     SLR F/L/R 10x 10x+R 10x ea     Knee/Flex/Ext    Add    F/L Lunges        Kickboard Ex. Small Small Small     Iso Abd. 10x5\" 10x5\" 10x5\"     Push-pull 10x 10x 10x     Paddling                UE Format                        Stretches    Add    Achllies        Hamstring                                .         Cool Down  2 Laps @ [address] report ability to stand and sit for 10 minutes with symmetrical weight distribution and without needing to shift weight to increase tolerance to prolonged positioning.     Patient goals: \"help with pain\"      Pt. Education:  [x] Yes  [] No  [x] Reviewed Prior HEP/Ed  Method of Education: [] Verbal  [x] Demo  [] Written  Comprehension of Education:  [] Verbalizes understanding. [] Demonstrates understanding. [] Needs review. [x] Demonstrates/verbalizes HEP/Ed previously given. Plan: [x] Continue per plan of care.     [] Other:       Time In: 3227       Time Out:  1630    Electronically signed by Dung Cleveland PTA on 6/6/2019 at 4:08 PM

## 2019-06-11 ENCOUNTER — TELEPHONE (OUTPATIENT)
Dept: GASTROENTEROLOGY | Age: 59
End: 2019-06-11

## 2019-06-17 ENCOUNTER — ANESTHESIA EVENT (OUTPATIENT)
Dept: OPERATING ROOM | Age: 59
End: 2019-06-17
Payer: MEDICAID

## 2019-06-17 NOTE — TELEPHONE ENCOUNTER
Patient called stating she lost her prep instructions. Went over all her steps for prep and reminded patient that she should be clear liquids today. Patient stated that she had a half cup of coffee with cream in it. Advised patient that he should not have anymore but she should be okay to proceed with procedure. She voiced understanding of prep and is making sure her pharmacy has her Dulcolax tablets. Patient to call back with any other questions.

## 2019-06-18 ENCOUNTER — ANESTHESIA (OUTPATIENT)
Dept: OPERATING ROOM | Age: 59
End: 2019-06-18
Payer: MEDICAID

## 2019-06-18 ENCOUNTER — HOSPITAL ENCOUNTER (OUTPATIENT)
Age: 59
Setting detail: OUTPATIENT SURGERY
Discharge: HOME | End: 2019-06-18
Attending: INTERNAL MEDICINE | Admitting: INTERNAL MEDICINE
Payer: MEDICAID

## 2019-06-18 VITALS
TEMPERATURE: 97.3 F | DIASTOLIC BLOOD PRESSURE: 79 MMHG | SYSTOLIC BLOOD PRESSURE: 144 MMHG | OXYGEN SATURATION: 99 % | HEIGHT: 63 IN | BODY MASS INDEX: 33.66 KG/M2 | RESPIRATION RATE: 18 BRPM | HEART RATE: 80 BPM | WEIGHT: 190 LBS

## 2019-06-18 VITALS
OXYGEN SATURATION: 100 % | DIASTOLIC BLOOD PRESSURE: 49 MMHG | SYSTOLIC BLOOD PRESSURE: 101 MMHG | RESPIRATION RATE: 8 BRPM

## 2019-06-18 LAB
GLUCOSE BLD-MCNC: 72 MG/DL (ref 65–105)
GLUCOSE BLD-MCNC: 79 MG/DL (ref 65–105)

## 2019-06-18 PROCEDURE — 7100000010 HC PHASE II RECOVERY - FIRST 15 MIN: Performed by: INTERNAL MEDICINE

## 2019-06-18 PROCEDURE — 3700000001 HC ADD 15 MINUTES (ANESTHESIA): Performed by: INTERNAL MEDICINE

## 2019-06-18 PROCEDURE — 3609027000 HC COLONOSCOPY: Performed by: INTERNAL MEDICINE

## 2019-06-18 PROCEDURE — 82947 ASSAY GLUCOSE BLOOD QUANT: CPT

## 2019-06-18 PROCEDURE — 45378 DIAGNOSTIC COLONOSCOPY: CPT | Performed by: INTERNAL MEDICINE

## 2019-06-18 PROCEDURE — 2580000003 HC RX 258: Performed by: ANESTHESIOLOGY

## 2019-06-18 PROCEDURE — 3700000000 HC ANESTHESIA ATTENDED CARE: Performed by: INTERNAL MEDICINE

## 2019-06-18 PROCEDURE — 2500000003 HC RX 250 WO HCPCS: Performed by: NURSE ANESTHETIST, CERTIFIED REGISTERED

## 2019-06-18 PROCEDURE — 2709999900 HC NON-CHARGEABLE SUPPLY: Performed by: INTERNAL MEDICINE

## 2019-06-18 PROCEDURE — 6360000002 HC RX W HCPCS: Performed by: NURSE ANESTHETIST, CERTIFIED REGISTERED

## 2019-06-18 PROCEDURE — 7100000011 HC PHASE II RECOVERY - ADDTL 15 MIN: Performed by: INTERNAL MEDICINE

## 2019-06-18 RX ORDER — FENTANYL CITRATE 50 UG/ML
INJECTION, SOLUTION INTRAMUSCULAR; INTRAVENOUS PRN
Status: DISCONTINUED | OUTPATIENT
Start: 2019-06-18 | End: 2019-06-18 | Stop reason: SDUPTHER

## 2019-06-18 RX ORDER — SODIUM CHLORIDE 0.9 % (FLUSH) 0.9 %
10 SYRINGE (ML) INJECTION EVERY 12 HOURS SCHEDULED
Status: DISCONTINUED | OUTPATIENT
Start: 2019-06-18 | End: 2019-06-18 | Stop reason: HOSPADM

## 2019-06-18 RX ORDER — PROPOFOL 10 MG/ML
INJECTION, EMULSION INTRAVENOUS PRN
Status: DISCONTINUED | OUTPATIENT
Start: 2019-06-18 | End: 2019-06-18 | Stop reason: SDUPTHER

## 2019-06-18 RX ORDER — ONDANSETRON 2 MG/ML
4 INJECTION INTRAMUSCULAR; INTRAVENOUS
Status: DISCONTINUED | OUTPATIENT
Start: 2019-06-18 | End: 2019-06-18 | Stop reason: HOSPADM

## 2019-06-18 RX ORDER — LIDOCAINE HYDROCHLORIDE 10 MG/ML
1 INJECTION, SOLUTION EPIDURAL; INFILTRATION; INTRACAUDAL; PERINEURAL
Status: DISCONTINUED | OUTPATIENT
Start: 2019-06-18 | End: 2019-06-18 | Stop reason: HOSPADM

## 2019-06-18 RX ORDER — LIDOCAINE HYDROCHLORIDE 20 MG/ML
INJECTION, SOLUTION EPIDURAL; INFILTRATION; INTRACAUDAL; PERINEURAL PRN
Status: DISCONTINUED | OUTPATIENT
Start: 2019-06-18 | End: 2019-06-18 | Stop reason: SDUPTHER

## 2019-06-18 RX ORDER — MIDAZOLAM HYDROCHLORIDE 1 MG/ML
INJECTION INTRAMUSCULAR; INTRAVENOUS PRN
Status: DISCONTINUED | OUTPATIENT
Start: 2019-06-18 | End: 2019-06-18 | Stop reason: SDUPTHER

## 2019-06-18 RX ORDER — SODIUM CHLORIDE, SODIUM LACTATE, POTASSIUM CHLORIDE, CALCIUM CHLORIDE 600; 310; 30; 20 MG/100ML; MG/100ML; MG/100ML; MG/100ML
INJECTION, SOLUTION INTRAVENOUS CONTINUOUS
Status: DISCONTINUED | OUTPATIENT
Start: 2019-06-18 | End: 2019-06-18 | Stop reason: HOSPADM

## 2019-06-18 RX ORDER — SODIUM CHLORIDE 0.9 % (FLUSH) 0.9 %
10 SYRINGE (ML) INJECTION PRN
Status: DISCONTINUED | OUTPATIENT
Start: 2019-06-18 | End: 2019-06-18 | Stop reason: HOSPADM

## 2019-06-18 RX ORDER — SODIUM CHLORIDE 9 MG/ML
INJECTION, SOLUTION INTRAVENOUS CONTINUOUS
Status: DISCONTINUED | OUTPATIENT
Start: 2019-06-18 | End: 2019-06-18

## 2019-06-18 RX ADMIN — Medication 25 MCG: at 11:00

## 2019-06-18 RX ADMIN — SODIUM CHLORIDE, POTASSIUM CHLORIDE, SODIUM LACTATE AND CALCIUM CHLORIDE: 600; 310; 30; 20 INJECTION, SOLUTION INTRAVENOUS at 10:52

## 2019-06-18 RX ADMIN — PROPOFOL 30 MG: 10 INJECTION, EMULSION INTRAVENOUS at 11:00

## 2019-06-18 RX ADMIN — Medication 25 MCG: at 11:09

## 2019-06-18 RX ADMIN — Medication 25 MCG: at 11:03

## 2019-06-18 RX ADMIN — PROPOFOL 30 MG: 10 INJECTION, EMULSION INTRAVENOUS at 11:04

## 2019-06-18 RX ADMIN — PROPOFOL 20 MG: 10 INJECTION, EMULSION INTRAVENOUS at 11:14

## 2019-06-18 RX ADMIN — PROPOFOL 30 MG: 10 INJECTION, EMULSION INTRAVENOUS at 11:03

## 2019-06-18 RX ADMIN — MIDAZOLAM 2 MG: 1 INJECTION INTRAMUSCULAR; INTRAVENOUS at 11:00

## 2019-06-18 RX ADMIN — PROPOFOL 30 MG: 10 INJECTION, EMULSION INTRAVENOUS at 11:11

## 2019-06-18 RX ADMIN — PROPOFOL 30 MG: 10 INJECTION, EMULSION INTRAVENOUS at 11:05

## 2019-06-18 RX ADMIN — SODIUM CHLORIDE, POTASSIUM CHLORIDE, SODIUM LACTATE AND CALCIUM CHLORIDE: 600; 310; 30; 20 INJECTION, SOLUTION INTRAVENOUS at 09:49

## 2019-06-18 RX ADMIN — PROPOFOL 30 MG: 10 INJECTION, EMULSION INTRAVENOUS at 11:07

## 2019-06-18 RX ADMIN — Medication 25 MCG: at 11:12

## 2019-06-18 RX ADMIN — LIDOCAINE HYDROCHLORIDE 100 MG: 20 INJECTION, SOLUTION EPIDURAL; INFILTRATION; INTRACAUDAL; PERINEURAL at 11:00

## 2019-06-18 ASSESSMENT — PAIN SCALES - GENERAL
PAINLEVEL_OUTOF10: 0

## 2019-06-18 ASSESSMENT — PULMONARY FUNCTION TESTS
PIF_VALUE: 1

## 2019-06-18 ASSESSMENT — PAIN - FUNCTIONAL ASSESSMENT: PAIN_FUNCTIONAL_ASSESSMENT: 0-10

## 2019-06-18 NOTE — OP NOTE
[place] [facility]     [facility] ENDOSCOPY     COLONOSCOPY    PROCEDURE DATE: 06/18/19    REFERRING PHYSICIAN: No ref. provider found     PRIMARY CARE PROVIDER: Leela Hernández MD    ATTENDING PHYSICIAN: Apollo Pérez MD     HISTORY: Ms. Janes Richard is a 60 y.o. female who presents to the [facility] endoscopy unit for colonoscopy. The patient's clinical history is remarkable for HTN, DM, Depression, HL, referred for screening colonoscopy. She is currently medically stable and appropriate for the planned procedure. PREOPERATIVE DIAGNOSIS: screening for colon cancer. PROCEDURES:   Transanal Colonoscopy --screening. POSTPROCEDURE DIAGNOSIS:    FAIR to adequate PREP    Patient had no large polyps, no concerning lesions or masses  Small internal hemorrhoids    MEDICATIONS:     MAC per anesthesia     EBL 0cc    INSTRUMENT: Olympus CF-H180 AL Pediatric flexible Colonoscope. PREPARATION: The nature and character of the procedure as well as risks, benefits, and alternatives were discussed with the patient and informed consent was obtained. Complications were said to include, but were not limited to: medication allergy, medication reaction, cardiovascular and respiratory problems, bleeding, perforation, infection, and/or missed diagnosis. Following arrival in the endoscopy room, the patient was placed in the left lateral decubitus position and final time-out accomplished in the presence of the nursing staff. Baseline vital signs were obtained and reviewed, and IV sedation was subsequently initiated. FINDINGS: Rectal examination demonstrated no significant visible external abnormality and digital palpation was unremarkable. Following adequate conscious sedation the colonoscope was introduced and advanced under direct visualization to the cecum, which was identified by the ileocecal valve and appendiceal orifice. The bowel preparation was felt to be FAIR to ADEQUATE.  This included moderate amounts of green stool that was able to be adequately irrigated and aspirated. Cecal intubation time was 9 minutes. Once maximally inserted, the endoscope was withdrawn and the mucosa was carefully inspected. The mucosal exam was revealed no large polyps, concerning masses or lesions. Retroflexion was performed in the rectum and small internal hemorrhoids were seen. Withdrawal time was 9 minutes. IMPRESSION:      FAIR to adequate PREP    Patient had no large polyps, no concerning lesions or masses  Small internal hemorrhoids    RECOMMENDATIONS:   1) Follow up with referring provider, as previously scheduled.    2) Repeat Colonoscopy in 10 yrs        [facility]

## 2019-06-18 NOTE — ANESTHESIA POSTPROCEDURE EVALUATION
Department of Anesthesiology  Postprocedure Note    Patient: Daquan Sanders  MRN: [id number]  YOB: 1960  Date of evaluation: 6/18/2019  Time:  12:10 PM     Procedure Summary     Date:  06/18/19 Room / Location:  [place] [address] / [place]  OR    Anesthesia Start:  1054 Anesthesia Stop:  [id number]    Procedure:  COLORECTAL CANCER SCREENING, NOT HIGH RISK (N/A ) Diagnosis:  (DX SCREENING)    Surgeon:  Radha Andrew MD Responsible Provider:  Carlitos Doan MD    Anesthesia Type:  MAC ASA Status:  3          Anesthesia Type: No value filed. Reinaldo Phase I:      Reinaldo Phase II: Reinaldo Score: 10    Last vitals: Reviewed and per EMR flowsheets.        Anesthesia Post Evaluation    Complications: no

## 2019-06-18 NOTE — ANESTHESIA PRE PROCEDURE
Department of Anesthesiology  Preprocedure Note       Name:  Sisi Foster   Age:  60 y.o.  :  1960                                          MRN:  [id number]         Date:  2019      Surgeon: Mary Kate Miller):  Valerie Sims MD    Procedure: COLORECTAL CANCER SCREENING, NOT HIGH RISK (N/A )    Medications prior to admission:   Prior to Admission medications    Medication Sig Start Date End Date Taking? Authorizing Provider   bisacodyl (DULCOLAX) 5 MG EC tablet TAKE 4 TABS AT 8 PM DAY PRIOR TO COLONOSCOPY 19  Yes Valerie Sims MD   vitamin D (ERGOCALCIFEROL) 51816 units CAPS capsule Take 1 capsule by mouth once a week 19   Adrian Al MD   diclofenac sodium (VOLTAREN) 1 % GEL Apply 4 g topically 4 times daily 19  Margaret Live DPM   losartan (COZAAR) 50 MG tablet Take 1 tablet by mouth daily 19   Adrian Al MD   atorvastatin (LIPITOR) 10 MG tablet Take 1 tablet by mouth daily 19   Adrian Al MD   gabapentin (NEURONTIN) 300 MG capsule Take 2 capsules by mouth nightly for 30 doses. 19  Adrian Al MD   glimepiride (AMARYL) 4 MG tablet Take 1 tablet by mouth every morning 19   Adrian Al MD   metFORMIN (GLUCOPHAGE) 1000 MG tablet take 1 tablet by mouth once daily (pt takes at night) 19   Adrian Al MD   mupirocin (BACTROBAN) 2 % ointment Apply topically 3 times daily. 19   Margaret Live DPM   blood glucose monitor strips Use to check BS BID 18   Adrian Al MD   Lancets 30G MISC 1 each by Does not apply route 2 times daily Use to check BS BID Dx E 11.9 18   Adrian Al MD   Blood Glucose Monitoring Suppl (ONE TOUCH ULTRA 2) w/Device KIT use as directed DUE TO PATIENTS INSURANCE, WE NEED TO GET A NEW METER FOR ONE TOUCH, DUE TO INS. [employer]RIENT[employer] 18   Adrian Al MD   aspirin 81 MG tablet Take 81 mg by mouth daily.     Historical Provider, MD       Current medications:    Current Facility-Administered Medications   Medication Dose Route Frequency Provider Last Rate Last Dose    lactated ringers infusion   Intravenous Continuous Tre Proctor  mL/hr at 06/18/19 0949      sodium chloride flush 0.9 % injection 10 mL  10 mL Intravenous 2 times per day Tre Proctor MD        sodium chloride flush 0.9 % injection 10 mL  10 mL Intravenous PRN Pranav Viramontes MD        lidocaine PF 1 % injection 1 mL  1 mL Intradermal Once PRN Tre Proctor MD           Allergies:     Allergies   Allergen Reactions    Lisinopril Shortness Of Breath     SOB and cough    Codeine Hives       Problem List:    Patient Active Problem List   Diagnosis Code    [facility] E11.9    HTN (hypertension) I10    Dyslipidemia E78.5    Glaucoma H40.9    Depression F32.9    Bone spur of left foot M77.52    Pain, foot, left, chronic M79.672, G89.29    Onychomycosis B35.1    Neuropathy G62.9    History of TIA (transient ischemic attack) Z86.73    Chest pain R07.9    Abnormal echocardiogram R93.1    Positive cardiac stress test R94.39    Hyperlipidemia E78.5    Primary insomnia F51.01    Type 2 diabetes mellitus with complication, without long-term current use of insulin (HCC) E11.8    Pain of great toe, left M79.675    Ganglion cyst of left foot M67.472    Vitamin D deficiency E55.9    Vitamin D deficiency E55.9    Type 2 diabetes mellitus, without long-term current use of insulin (HCC) E11.9    Low back pain M54.5    Encounter for screening colonoscopy Z12.11       Past Medical History:        Diagnosis Date    Arthritis     Bulging disc 07/13    c4 c5    Cerebral artery occlusion with cerebral infarction ([facility]     tia    Depression 10/3/2014    Diabetic neuropathy (HCC)     all L.L.    Glaucoma     LT EYE    History of blood transfusion     1976 AFTER CHILDBIRTH    Hypertension     Irregular heart beat     hx of    TIA (transient ischemic attack) 07/22/13    Type II or unspecified type diabetes mellitus without mention of complication, not stated as uncontrolled     Wears glasses     for reading       Past Surgical History:        Procedure Laterality Date    APPENDECTOMY  2005    BLADDER SURGERY  2005    Bladder tear. , PT NOT AWARE OF THIS    BREAST SURGERY Right     BIOPSY    CARDIAC SURGERY      CARDIAC CATH  WAS NEG     SECTION, LOW TRANSVERSE      x3    CHOLECYSTECTOMY      COLONOSCOPY      negative    FINGER NAIL SURGERY Left 2019    AVULSION LEFT HALLUX TOENAIL AND INJECTION OF LEFT FOOT CYST performed by Pema Smith DPM at [address] [facility] [address]      as a child    HYSTERECTOMY  2005    Supracervical abd hyst. Cervix remains    OTHER SURGICAL HISTORY      cyst removed back of head    SMALL INTESTINE SURGERY      removed adhesions    SPLENECTOMY      part of pancreas removed    TOENAIL AVULSION Left 10/24/2016    2ND TOE       Social History:    Social History     Tobacco Use    Smoking status: Never Smoker    Smokeless tobacco: Never Used   Substance Use Topics    Alcohol use: Yes     Comment: rare                                Counseling given: Not Answered      Vital Signs (Current):   Vitals:    19 0930 19 0940   BP: (!) 157/72    Pulse: 76    Resp: 18    Temp: 98.1 °F (36.7 °C)    TempSrc: Oral    SpO2: 99%    Weight:  190 lb (86.2 kg)   Height:  5' 3\" (1.6 m)                                              BP Readings from Last 3 Encounters:   19 (!) 157/72   19 130/70   19 130/80       NPO Status: Time of last liquid consumption: 0400                        Time of last solid consumption: 1900                        Date of last liquid consumption: 19                        Date of last solid food consumption: 19    BMI:   Wt Readings from Last 3 Encounters:   19 190 lb (86.2 kg)   19 195 lb (88.5 kg)   19 193 lb 12.8 oz (87.9 kg)     Body mass index is 33.66 kg/m². CBC:   Lab Results   Component Value Date    WBC 6.4 04/18/2019    RBC 4.42 04/18/2019    HGB 12.6 04/18/2019    HCT 40.6 04/18/2019    MCV 91.9 04/18/2019    RDW 16.2 04/18/2019     04/18/2019       CMP:   Lab Results   Component Value Date     04/18/2019    K 3.8 04/18/2019     04/18/2019    CO2 24 04/18/2019    BUN 16 04/18/2019    CREATININE 0.76 04/18/2019    GFRAA >60 04/18/2019    LABGLOM >60 04/18/2019    GLUCOSE 75 04/18/2019    PROT 7.9 04/18/2019    CALCIUM 9.9 04/18/2019    BILITOT 0.47 04/18/2019    ALKPHOS 119 04/18/2019    AST 23 04/18/2019    ALT 25 04/18/2019       POC Tests:   Recent Labs     06/18/19  0951   POCGLU 79       Coags: No results found for: PROTIME, INR, APTT    HCG (If Applicable): No results found for: PREGTESTUR, PREGSERUM, HCG, HCGQUANT     ABGs: No results found for: PHART, PO2ART, OLP4QZZ, WGO1DGM, BEART, G0CNEMKY     Type & Screen (If Applicable):  No results found for: LABABO, LABRH    Anesthesia Evaluation    Airway: Mallampati: I  TM distance: >3 FB   Neck ROM: full  Mouth opening: > = 3 FB Dental:          Pulmonary:                              Cardiovascular:    (+) hypertension:,     (-)  angina                Neuro/Psych:               GI/Hepatic/Renal:             Endo/Other:    (+) Diabetes, . Abdominal:           Vascular:                                        Anesthesia Plan      MAC     ASA 3             Anesthetic plan and risks discussed with patient.                       Leslye Hilario MD   6/18/2019

## 2019-06-18 NOTE — H&P
GI History and Physical    Pt Name: Savanna Triana  MRN: [id number]  YOB: 1960  Date of evaluation: 2019  Primary Care Physician: Debbie Platt MD    SUBJECTIVE:   History of Chief Complaint: This is Savanna Triana a 60 y.o. female who presents today for a COLONOSCOPY  by Dr Shyam Allred  for [address] .   The patient completed the prep as directed Yes. Bowel movements have not significantly changed The patient does not have a family history of colon cancer or polyps. Previous colonoscopy Yes. OVER 10 YEARS AGO . Biopsies were taken. Denies history of ulcers, hiatal hernia, acid reflux/GERD, or IBS. Patient today denies  fever, chills, night sweats, pain or unexplained weight loss. Past Medical History    has a past medical history of Arthritis, Bulging disc, Cerebral artery occlusion with cerebral infarction ([address] Depression, Diabetic neuropathy ([address] Glaucoma, History of blood transfusion, Hypertension, Irregular heart beat, TIA (transient ischemic attack), Type II or unspecified type diabetes mellitus without mention of complication, not stated as uncontrolled, and Wears glasses. Past Surgical History   has a past surgical history that includes  section, low transverse; Cholecystectomy; hernia repair; Splenectomy; Small intestine surgery; Bladder surgery (2005); Colonoscopy; other surgical history (); Cardiac surgery; Appendectomy (2005); Breast surgery (Right); Toenail Avulsion (Left, 10/24/2016); Hysterectomy (2005); and Finger nail surgery (Left, 2019). Medications  Prior to Admission medications    Medication Sig Start Date End Date Taking?  Authorizing Provider   bisacodyl (DULCOLAX) 5 MG EC tablet TAKE 4 TABS AT 8 PM DAY PRIOR TO COLONOSCOPY 19  Yes Samara Andujar MD   vitamin D (ERGOCALCIFEROL) 80815 units CAPS capsule Take 1 capsule by mouth once a week 19   Khang Dickens MD   diclofenac sodium (VOLTAREN) 1 % GEL Apply 4 g topically 4 times daily 5/9/19 6/8/19  Natalia Andrea DPM   losartan (COZAAR) 50 MG tablet Take 1 tablet by mouth daily 4/30/19   Candi Valentino MD   atorvastatin (LIPITOR) 10 MG tablet Take 1 tablet by mouth daily 4/24/19   Candi Valentino MD   gabapentin (NEURONTIN) 300 MG capsule Take 2 capsules by mouth nightly for 30 doses. 4/24/19 5/24/19  Candi Valentino MD   glimepiride (AMARYL) 4 MG tablet Take 1 tablet by mouth every morning 4/24/19   Candi Valentino MD   metFORMIN (GLUCOPHAGE) 1000 MG tablet take 1 tablet by mouth once daily (pt takes at night) 4/24/19   Candi Valentino MD   mupirocin (BACTROBAN) 2 % ointment Apply topically 3 times daily. 2/7/19   Natalia Andrea DPM   blood glucose monitor strips Use to check BS BID 9/26/18   Candi Valentino MD   Lancets 30G MISC 1 each by Does not apply route 2 times daily Use to check BS BID Dx E 11.9 9/26/18   Candi Valentino MD   Blood Glucose Monitoring Suppl (ONE TOUCH ULTRA 2) w/Device KIT use as directed DUE TO PATIENTS INSURANCE, WE NEED TO GET A NEW METER FOR ONE TOUCH, DUE TO INS. [employer] 9/26/18   Candi Valentino MD   aspirin 81 MG tablet Take 81 mg by mouth daily. Historical Provider, MD     Allergies  is allergic to lisinopril and codeine. Family History  family history includes COPD in her mother; Cancer in her father, maternal aunt, maternal cousin, paternal grandfather, paternal grandmother, sister, and sister; Diabetes in her maternal grandfather; Heart Disease in her maternal grandfather and mother; Hypertension in her maternal aunt and maternal grandfather. Social History   reports that she has never smoked. She has never used smokeless tobacco.   reports that she drinks alcohol. reports that she does not use drugs. ROS: Pertinent findings in the HPI above. A comprehensive review of systems was essentially negative   DENIES . exertional chest pain, dyspnea, gastrointestinal symptoms, musculoskeletal symptoms and neurologic symptoms No LMP recorded. Patient has had a hysterectomy. [id number]      OBJECTIVE:   VITALS:  height is 5' 3\" (1.6 m) and weight is 190 lb (86.2 kg). Her oral temperature is 98.1 °F (36.7 °C). Her blood pressure is 157/72 (abnormal) and her pulse is 76. Her respiration is 18 and oxygen saturation is 99%. CONSTITUTIONAL:This is a 60 y.o. female who is cooperative, pleasant, alert & orientated x 3, and in no acute distress   SKIN:  Warm and dry, no rashes   HEAD:  Normocephalic, atraumatic   EYES: PERRL. EOMs intact. EARS:  Hearing grossly WNL. NOSE:  Nares patent. No rhinorrhea   THROAT:  Airway is patent, membranes are moist   NECK:supple, no lymphadenopathy  LUNGS: Clear to auscultation bilaterally, no wheezes, rales, or rhonchi. CARDIOVASCULAR: Heart sounds are normal.  Regular rate and rhythm without murmur, gallop or rub. ABDOMEN: soft, non tender, non distended, no masses or organomegaly bowel sounds present  EXTREMITIES: no peripheral edema bilateral   NEURO: Cranial nerves II-XII grossly intact Strength 5+/5+     Testing:       Lab Review:    CBC:   Lab Results   Component Value Date    WBC 6.4 04/18/2019    RBC 4.42 04/18/2019    HGB 12.6 04/18/2019    HCT 40.6 04/18/2019    MCV 91.9 04/18/2019    MCH 28.5 04/18/2019    MCHC 31.0 04/18/2019    RDW 16.2 04/18/2019     04/18/2019       IMPRESSIONS:   1. COLORECTAL CANCER SCREENING  2.  has a past medical history of Arthritis, Bulging disc (07/13), Cerebral artery occlusion with cerebral infarction [facility]), Depression (10/3/2014), Diabetic neuropathy ([facility] Glaucoma, History of blood transfusion, Hypertension, Irregular heart beat, TIA (transient ischemic attack) (07/22/13), Type II or unspecified type diabetes mellitus without mention of complication, not stated as uncontrolled, and Wears glasses.    PLANS:   1. COLONOSCOPY    SAM CANDELARIA APRN, ACNP-BC  Electronically signed 6/18/2019 at 9:55 AM

## 2019-07-10 DIAGNOSIS — Z79.4 TYPE 2 DIABETES MELLITUS WITH COMPLICATION, WITH LONG-TERM CURRENT USE OF INSULIN (HCC): ICD-10-CM

## 2019-07-10 DIAGNOSIS — E11.8 TYPE 2 DIABETES MELLITUS WITH COMPLICATION, WITH LONG-TERM CURRENT USE OF INSULIN (HCC): ICD-10-CM

## 2019-07-10 RX ORDER — DULAGLUTIDE 0.75 MG/.5ML
INJECTION, SOLUTION SUBCUTANEOUS
Qty: 2 ML | Refills: 1 | Status: SHIPPED | OUTPATIENT
Start: 2019-07-10 | End: 2019-11-06 | Stop reason: SDUPTHER

## 2019-07-10 NOTE — TELEPHONE ENCOUNTER
Viv Rosas is calling to request a refill on the following medication(s):  Requested Prescriptions     Pending Prescriptions Disp Refills    [facility] 9.79 EP/6.3HL SOPN [Pharmacy Med Name: [facility] 3.56 YW/2.4 ML PEN] 2 mL 1     Sig: inject 0.5 milliliters INTO THE SKIN ONCE A WEEK       Last Visit Date (If Applicable):  5/12/9603    Next Visit Date:    Visit date not found

## 2019-08-21 DIAGNOSIS — E11.42 DIABETIC POLYNEUROPATHY ASSOCIATED WITH TYPE 2 DIABETES MELLITUS (HCC): ICD-10-CM

## 2019-08-21 RX ORDER — GABAPENTIN 300 MG/1
600 CAPSULE ORAL NIGHTLY
Qty: 60 CAPSULE | Refills: 0 | Status: SHIPPED | OUTPATIENT
Start: 2019-08-21 | End: 2019-11-06 | Stop reason: SDUPTHER

## 2019-10-08 ENCOUNTER — OFFICE VISIT (OUTPATIENT)
Dept: FAMILY MEDICINE CLINIC | Age: 59
End: 2019-10-08
Payer: MEDICAID

## 2019-10-08 VITALS
DIASTOLIC BLOOD PRESSURE: 77 MMHG | WEIGHT: 189.8 LBS | OXYGEN SATURATION: 98 % | SYSTOLIC BLOOD PRESSURE: 130 MMHG | HEIGHT: 63 IN | BODY MASS INDEX: 33.63 KG/M2 | HEART RATE: 98 BPM

## 2019-10-08 DIAGNOSIS — E11.8 TYPE 2 DIABETES MELLITUS WITH COMPLICATION, WITHOUT LONG-TERM CURRENT USE OF INSULIN (HCC): ICD-10-CM

## 2019-10-08 DIAGNOSIS — E55.9 VITAMIN D DEFICIENCY: ICD-10-CM

## 2019-10-08 DIAGNOSIS — E78.5 DYSLIPIDEMIA: ICD-10-CM

## 2019-10-08 DIAGNOSIS — I10 ESSENTIAL HYPERTENSION: ICD-10-CM

## 2019-10-08 DIAGNOSIS — E11.69 TYPE 2 DIABETES MELLITUS WITH OTHER SPECIFIED COMPLICATION, WITHOUT LONG-TERM CURRENT USE OF INSULIN (HCC): Primary | ICD-10-CM

## 2019-10-08 DIAGNOSIS — F32.A DEPRESSION, UNSPECIFIED DEPRESSION TYPE: ICD-10-CM

## 2019-10-08 DIAGNOSIS — F51.01 PRIMARY INSOMNIA: ICD-10-CM

## 2019-10-08 DIAGNOSIS — F32.3 SEVERE MAJOR DEPRESSION WITH PSYCHOTIC FEATURES (HCC): ICD-10-CM

## 2019-10-08 PROCEDURE — 3017F COLORECTAL CA SCREEN DOC REV: CPT | Performed by: FAMILY MEDICINE

## 2019-10-08 PROCEDURE — G8484 FLU IMMUNIZE NO ADMIN: HCPCS | Performed by: FAMILY MEDICINE

## 2019-10-08 PROCEDURE — 1036F TOBACCO NON-USER: CPT | Performed by: FAMILY MEDICINE

## 2019-10-08 PROCEDURE — G8417 CALC BMI ABV UP PARAM F/U: HCPCS | Performed by: FAMILY MEDICINE

## 2019-10-08 PROCEDURE — 3044F HG A1C LEVEL LT 7.0%: CPT | Performed by: FAMILY MEDICINE

## 2019-10-08 PROCEDURE — 2022F DILAT RTA XM EVC RTNOPTHY: CPT | Performed by: FAMILY MEDICINE

## 2019-10-08 PROCEDURE — G8427 DOCREV CUR MEDS BY ELIG CLIN: HCPCS | Performed by: FAMILY MEDICINE

## 2019-10-08 PROCEDURE — 99214 OFFICE O/P EST MOD 30 MIN: CPT | Performed by: FAMILY MEDICINE

## 2019-10-08 RX ORDER — ASPIRIN 81 MG/1
TABLET, CHEWABLE ORAL
COMMUNITY
Start: 2013-09-30 | End: 2019-10-08 | Stop reason: SDUPTHER

## 2019-10-08 RX ORDER — TRAZODONE HYDROCHLORIDE 50 MG/1
TABLET ORAL
Refills: 0 | COMMUNITY
Start: 2019-09-25 | End: 2020-08-31 | Stop reason: DRUGHIGH

## 2019-10-08 RX ORDER — ARIPIPRAZOLE 5 MG/1
TABLET ORAL
Refills: 0 | COMMUNITY
Start: 2019-09-25 | End: 2020-08-31 | Stop reason: DRUGHIGH

## 2019-10-08 ASSESSMENT — ENCOUNTER SYMPTOMS
TROUBLE SWALLOWING: 0
EYE REDNESS: 0
NAUSEA: 0
SHORTNESS OF BREATH: 0
ANAL BLEEDING: 0
DIARRHEA: 0
CHEST TIGHTNESS: 0
COLOR CHANGE: 0
VOMITING: 0
ABDOMINAL PAIN: 0
EYE DISCHARGE: 0
RECTAL PAIN: 0
EYE PAIN: 0
ABDOMINAL DISTENTION: 0
SINUS PRESSURE: 0
VOICE CHANGE: 0
BLOOD IN STOOL: 0
BACK PAIN: 1
COUGH: 0
CONSTIPATION: 0

## 2019-10-08 ASSESSMENT — PATIENT HEALTH QUESTIONNAIRE - PHQ9
2. FEELING DOWN, DEPRESSED OR HOPELESS: 0
SUM OF ALL RESPONSES TO PHQ QUESTIONS 1-9: 0
1. LITTLE INTEREST OR PLEASURE IN DOING THINGS: 0
SUM OF ALL RESPONSES TO PHQ QUESTIONS 1-9: 0
SUM OF ALL RESPONSES TO PHQ9 QUESTIONS 1 & 2: 0

## 2019-10-25 ENCOUNTER — HOSPITAL ENCOUNTER (OUTPATIENT)
Age: 59
Discharge: HOME | End: 2019-10-25
Payer: MEDICAID

## 2019-10-25 DIAGNOSIS — E11.69 TYPE 2 DIABETES MELLITUS WITH OTHER SPECIFIED COMPLICATION, WITHOUT LONG-TERM CURRENT USE OF INSULIN (HCC): ICD-10-CM

## 2019-10-25 DIAGNOSIS — E55.9 VITAMIN D DEFICIENCY: ICD-10-CM

## 2019-10-25 LAB
ANION GAP SERPL CALCULATED.3IONS-SCNC: 10 MMOL/L (ref 9–17)
BUN BLDV-MCNC: 16 MG/DL (ref 8–23)
BUN/CREAT BLD: ABNORMAL (ref 9–20)
CALCIUM SERPL-MCNC: 9.6 MG/DL (ref 8.6–10.4)
CHLORIDE BLD-SCNC: 105 MMOL/L (ref 98–107)
CO2: 26 MMOL/L (ref 20–31)
CREAT SERPL-MCNC: 0.67 MG/DL (ref 0.5–0.9)
CREATININE URINE: 198.5 MG/DL (ref 28–217)
GFR AFRICAN AMERICAN: >60 ML/MIN
GFR NON-AFRICAN AMERICAN: >60 ML/MIN
GFR SERPL CREATININE-BSD FRML MDRD: ABNORMAL ML/MIN/{1.73_M2}
GFR SERPL CREATININE-BSD FRML MDRD: ABNORMAL ML/MIN/{1.73_M2}
GLUCOSE BLD-MCNC: 139 MG/DL (ref 70–99)
MICROALBUMIN/CREAT 24H UR: <12 MG/L
MICROALBUMIN/CREAT UR-RTO: NORMAL MCG/MG CREAT
POTASSIUM SERPL-SCNC: 4 MMOL/L (ref 3.7–5.3)
SODIUM BLD-SCNC: 141 MMOL/L (ref 135–144)
VITAMIN D 25-HYDROXY: 22.9 NG/ML (ref 30–100)

## 2019-10-25 PROCEDURE — 80048 BASIC METABOLIC PNL TOTAL CA: CPT

## 2019-10-25 PROCEDURE — 82306 VITAMIN D 25 HYDROXY: CPT

## 2019-10-25 PROCEDURE — 83036 HEMOGLOBIN GLYCOSYLATED A1C: CPT

## 2019-10-25 PROCEDURE — 82043 UR ALBUMIN QUANTITATIVE: CPT

## 2019-10-25 PROCEDURE — 36415 COLL VENOUS BLD VENIPUNCTURE: CPT

## 2019-10-25 PROCEDURE — 82570 ASSAY OF URINE CREATININE: CPT

## 2019-10-27 LAB
ESTIMATED AVERAGE GLUCOSE: 148 MG/DL
HBA1C MFR BLD: 6.8 % (ref 4–6)

## 2019-11-06 DIAGNOSIS — E66.9 OBESITY, UNSPECIFIED CLASSIFICATION, UNSPECIFIED OBESITY TYPE, UNSPECIFIED WHETHER SERIOUS COMORBIDITY PRESENT: ICD-10-CM

## 2019-11-06 DIAGNOSIS — E11.8 TYPE 2 DIABETES MELLITUS WITH COMPLICATION, WITH LONG-TERM CURRENT USE OF INSULIN (HCC): ICD-10-CM

## 2019-11-06 DIAGNOSIS — Z79.4 TYPE 2 DIABETES MELLITUS WITH COMPLICATION, WITH LONG-TERM CURRENT USE OF INSULIN (HCC): ICD-10-CM

## 2019-11-06 DIAGNOSIS — I10 ESSENTIAL HYPERTENSION: ICD-10-CM

## 2019-11-06 DIAGNOSIS — E78.5 DYSLIPIDEMIA: ICD-10-CM

## 2019-11-06 DIAGNOSIS — E11.42 DIABETIC POLYNEUROPATHY ASSOCIATED WITH TYPE 2 DIABETES MELLITUS (HCC): ICD-10-CM

## 2019-11-06 DIAGNOSIS — E55.9 VITAMIN D DEFICIENCY: ICD-10-CM

## 2019-11-06 RX ORDER — ATORVASTATIN CALCIUM 10 MG/1
10 TABLET, FILM COATED ORAL DAILY
Qty: 90 TABLET | Refills: 0 | Status: SHIPPED | OUTPATIENT
Start: 2019-11-06

## 2019-11-06 RX ORDER — GLIMEPIRIDE 4 MG/1
4 TABLET ORAL EVERY MORNING
Qty: 90 TABLET | Refills: 0 | Status: SHIPPED | OUTPATIENT
Start: 2019-11-06 | End: 2020-01-13

## 2019-11-06 RX ORDER — LOSARTAN POTASSIUM 50 MG/1
50 TABLET ORAL DAILY
Qty: 90 TABLET | Refills: 0 | Status: SHIPPED | OUTPATIENT
Start: 2019-11-06 | End: 2020-01-28

## 2019-11-06 RX ORDER — GABAPENTIN 300 MG/1
600 CAPSULE ORAL NIGHTLY
Qty: 60 CAPSULE | Refills: 0 | Status: SHIPPED | OUTPATIENT
Start: 2019-11-06 | End: 2020-01-13

## 2019-11-06 RX ORDER — ERGOCALCIFEROL 1.25 MG/1
CAPSULE ORAL
Qty: 6 CAPSULE | Refills: 0 | Status: SHIPPED | OUTPATIENT
Start: 2019-11-06

## 2020-01-13 RX ORDER — GLIMEPIRIDE 4 MG/1
4 TABLET ORAL EVERY MORNING
Qty: 90 TABLET | Refills: 0 | Status: SHIPPED | OUTPATIENT
Start: 2020-01-13 | End: 2020-07-20 | Stop reason: ALTCHOICE

## 2020-01-13 RX ORDER — GABAPENTIN 300 MG/1
CAPSULE ORAL
Qty: 60 CAPSULE | Refills: 0 | Status: SHIPPED | OUTPATIENT
Start: 2020-01-13 | End: 2020-07-20

## 2020-01-13 NOTE — TELEPHONE ENCOUNTER
Yandel Gordon is calling to request a refill on the following medication(s):  Requested Prescriptions     Pending Prescriptions Disp Refills    gabapentin (NEURONTIN) 300 MG capsule [Pharmacy Med Name: GABAPENTIN 300 MG CAPSULE] 60 capsule 0     Sig: take 2 capsules by mouth nightly    glimepiride (AMARYL) 4 MG tablet [Pharmacy Med Name: GLIMEPIRIDE 4 MG TABLET] 90 tablet 0     Sig: take 1 tablet by mouth every morning    metFORMIN (GLUCOPHAGE) 1000 MG tablet [Pharmacy Med Name: METFORMIN HCL 1,000 MG TABLET] 90 tablet 0     Sig: take 1 tablet by mouth once daily (PATIENT TAKES AT NIGHT)       Last Visit Date (If Applicable):  89/3/3537    Next Visit Date:    Visit date not found    Electronically signed by Apollo Davis MA on 1/13/20 at 3:10 PM

## 2020-01-28 RX ORDER — LOSARTAN POTASSIUM 50 MG/1
TABLET ORAL
Qty: 90 TABLET | Refills: 0 | Status: SHIPPED | OUTPATIENT
Start: 2020-01-28

## 2020-02-05 NOTE — TELEPHONE ENCOUNTER
Curt Obando is calling to request a refill on the following medication(s):  Requested Prescriptions     Pending Prescriptions Disp Refills    Dulaglutide (TRULICITY) 8.76 IH/2.9UV SOPN [Pharmacy Med Name: TRULICITY 1.40 AS/4.5 ML PEN] 2 mL 1     Sig: inject 0.5 milliliter INTO THE SKIN every week       Last Visit Date (If Applicable):  52/2/7258    Next Visit Date:    Visit date not found    Electronically signed by Leela Bernard MA on 2/5/20 at 10:38 AM

## 2020-02-13 ENCOUNTER — OFFICE VISIT (OUTPATIENT)
Dept: PODIATRY | Age: 60
End: 2020-02-13
Payer: MEDICAID

## 2020-02-13 VITALS — WEIGHT: 195 LBS | HEIGHT: 63 IN | BODY MASS INDEX: 34.55 KG/M2

## 2020-02-13 PROCEDURE — 11721 DEBRIDE NAIL 6 OR MORE: CPT | Performed by: PODIATRIST

## 2020-02-13 PROCEDURE — G8427 DOCREV CUR MEDS BY ELIG CLIN: HCPCS | Performed by: PODIATRIST

## 2020-02-13 PROCEDURE — 3017F COLORECTAL CA SCREEN DOC REV: CPT | Performed by: PODIATRIST

## 2020-02-13 PROCEDURE — 2022F DILAT RTA XM EVC RTNOPTHY: CPT | Performed by: PODIATRIST

## 2020-02-13 PROCEDURE — 1036F TOBACCO NON-USER: CPT | Performed by: PODIATRIST

## 2020-02-13 PROCEDURE — G8417 CALC BMI ABV UP PARAM F/U: HCPCS | Performed by: PODIATRIST

## 2020-02-13 PROCEDURE — 99213 OFFICE O/P EST LOW 20 MIN: CPT | Performed by: PODIATRIST

## 2020-02-13 PROCEDURE — G8484 FLU IMMUNIZE NO ADMIN: HCPCS | Performed by: PODIATRIST

## 2020-02-13 PROCEDURE — 3046F HEMOGLOBIN A1C LEVEL >9.0%: CPT | Performed by: PODIATRIST

## 2020-02-13 RX ORDER — L-METHYLFOLATE-ALGAE-VIT B12-B6 CAP 3-90.314-2-35 MG 3-90.314-2-35 MG
1 CAP ORAL 2 TIMES DAILY
Qty: 60 CAPSULE | Refills: 5 | Status: SHIPPED | OUTPATIENT
Start: 2020-02-13

## 2020-02-13 ASSESSMENT — ENCOUNTER SYMPTOMS
COLOR CHANGE: 0
CONSTIPATION: 0
NAUSEA: 0
DIARRHEA: 0
VOMITING: 0

## 2020-02-13 NOTE — PROGRESS NOTES
[facility]  Return Patient     Ryan Basilio 60 y.o. female that presents for follow-up of   Chief Complaint   Patient presents with    Foot Pain     B/L feet and ankle pain    Other     last blood sugar in the 130's     Nail Problem     b/l nail trim/ last seen Dr. Breana Hu 10/8/19     She is still having pain in both feet, both legs, left is worse. Gradually getting worse over time. Her depression is worsening. Back pain worsening. Unable to work due to pain, pain is severe when on her feet for any length of time. She also has restlessness in her legs, burning, throbbing pain in her legs at night. On Gabapentin 600 mg at night only. Currently denies F/C/N/V. Allergies   Allergen Reactions    Lisinopril Shortness Of Breath     SOB and cough    Codeine Hives       Past Medical History:   Diagnosis Date    Arthritis     Bulging disc 07/13    c4 c5    Cerebral artery occlusion with [facility] infarction [address]     tia    Depression 10/3/2014    Diabetic neuropathy ([facility]     all L.L.    Glaucoma     LT EYE    History of blood transfusion     [address]    Hypertension     Irregular heart beat     hx of    TIA (transient ischemic attack) 07/22/13    Type II or unspecified type diabetes mellitus without mention of complication, not stated as uncontrolled     Wears glasses     for reading       Prior to Admission medications    Medication Sig Start Date End Date Taking?  Authorizing Provider   U-llttytidpdua-V4-B12 Willim Means) 3-79.570-1-64 MG CAPS capsule Take 1 capsule by mouth 2 times daily 2/13/20  Yes Sukhjinder Espinoza DPM   Dulaglutide (TRULICITY) 3.77 RO/5.8VW SOPN inject 0.5 milliliter INTO THE SKIN every week 2/5/20  Yes Ji Wilcox MD   losartan (COZAAR) 50 MG tablet take 1 tablet by mouth once daily 1/28/20  Yes Ji Wilcox MD   glimepiride (AMARYL) 4 MG tablet take 1 tablet by mouth every morning 1/13/20  Yes Ji Wilcox MD   metFORMIN (GLUCOPHAGE) 1000 MG tablet take 1 tablet by mouth once daily (PATIENT TAKES AT NIGHT) 1/13/20  Yes Michell Melton MD   atorvastatin (LIPITOR) 10 MG tablet Take 1 tablet by mouth daily 11/6/19  Yes Michell Melton MD   blood glucose test strips (ONE TOUCH ULTRA TEST) strip Use once daily to check BS 11/6/19  Yes Michell Melton MD   Lancets 30G MISC 1 each by Does not apply route daily Use to check BS BID Dx E 11.9 11/6/19  Yes Michell Melton MD   vitamin D (ERGOCALCIFEROL) 1.25 MG (42994 UT) CAPS capsule take 1 capsule by mouth every week 11/6/19  Yes Michell Melton MD   traZODone (DESYREL) 50 MG tablet take 1 tablet by mouth at bedtime 9/25/19  Yes Historical Provider, MD   diclofenac sodium 1 % GEL  9/13/19  Yes Historical Provider, MD   ARIPiprazole (ABILIFY) 5 MG tablet take 1 tablet by mouth once daily 9/25/19  Yes Historical Provider, MD   bisacodyl (DULCOLAX) 5 MG EC tablet TAKE 4 TABS AT 8 PM DAY PRIOR TO COLONOSCOPY 6/17/19  Yes Pia Agosto MD   Blood Glucose Monitoring Suppl (ONE TOUCH ULTRA 2) w/Device KIT use as directed DUE TO PATIENTS INSURANCE, WE NEED TO GET A NEW METER FOR ONE TOUCH, DUE TO INS. [employer] 9/26/18  Yes Michell Melton MD   aspirin 81 MG tablet Take 81 mg by mouth daily. Yes Historical Provider, MD   gabapentin (NEURONTIN) 300 MG capsule take 2 capsules by mouth nightly 1/13/20 2/12/20  Michell Melton MD       Review of Systems   Constitutional: Negative for chills, diaphoresis, fatigue, fever and unexpected weight change. Cardiovascular: Negative for leg swelling. Gastrointestinal: Negative for constipation, diarrhea, nausea and vomiting. Musculoskeletal: Positive for gait problem. Negative for arthralgias and joint swelling. Skin: Negative for color change, pallor, rash and wound. Neurological: Positive for numbness. Negative for weakness. Psychiatric/Behavioral: Positive for dysphoric mood. Lower Extremity Physical Examination:     Vitals:  There were no vitals filed for with the pt. Informed patient on proper diabetic foot care and importance of tight glycemic control. Patient to check feet daily and contact the office with any questions/problems/concerns. Other comorbidity noted and will be managed by PCP. Diabetic foot examination performed this visit. The exam included neurological sensory exam, a 10-g monofilament and pinprick sensation, vibration using a 128-Hz tuning fork, ankle reflexes, visual skin inspection, vascular exam including assessment of pedal pulses, orthopedic exam for deformities, and shoe inspection. Increased risk factors noted on the diabetic foot exam include decreased sensory exam and peripheral neuropathy. Shoegear inspected and found to be appropriate size and wear. Diabetic shoes and insoles: This patient would benefit from extra depth diabetic shoes and custom inserts. I feel he/she qualifies due to the above performed physical exam and diagnosis. I will do the appropriate paperwork and send it to their primary care physician. Then the patient will be measured for shoes and custom inserts to properly off load and protect his/her feet. Repeat EMG/NCV  Referral to neurology  Rx Metanx      Orders Placed This Encounter   Procedures   Wade Cortez MD, Neurology, [facility]     Referral Priority:   Routine     Referral Type:   Eval and Treat     Referral Reason:   Specialty Services Required     Referred to Provider:   Rik Streeter MD     Requested Specialty:   Neurology     Number of Visits Requested:   1    HM DIABETES FOOT EXAM    EMG     Standing Status:   Future     Standing Expiration Date:   2/13/2021     Scheduling Instructions:      EMG and nerve conduction study. Order Specific Question:   Which body part?      Answer:   b/l lower extremities    LA DEBRIDEMENT OF NAILS, 6 OR MORE     Orders Placed This Encounter   Medications    L-methylfolate-B6-B12 (METANX) 2-04.431-4-40 MG CAPS capsule

## 2020-02-26 ENCOUNTER — HOSPITAL ENCOUNTER (OUTPATIENT)
Dept: NEUROLOGY | Age: 60
Discharge: HOME | End: 2020-02-26
Payer: MEDICAID

## 2020-02-26 PROCEDURE — 95886 MUSC TEST DONE W/N TEST COMP: CPT | Performed by: PHYSICAL MEDICINE & REHABILITATION

## 2020-02-26 PROCEDURE — 95909 NRV CNDJ TST 5-6 STUDIES: CPT | Performed by: PHYSICAL MEDICINE & REHABILITATION

## 2020-03-25 PROBLEM — E55.9 VITAMIN D DEFICIENCY: Status: RESOLVED | Noted: 2019-04-24 | Resolved: 2020-03-24

## 2020-06-02 LAB
AVERAGE GLUCOSE: 143
HBA1C MFR BLD: 6.6 %

## 2020-06-09 ENCOUNTER — NURSE TRIAGE (OUTPATIENT)
Dept: OTHER | Facility: CLINIC | Age: 60
End: 2020-06-09

## 2020-06-09 NOTE — TELEPHONE ENCOUNTER
Received call from Adonis Rosa, [address]. Patient called in states she missed her appointment last week with her Podiatrist for her annual evaluation of her feet. Patient c/o pain in right foot, arch, heel, and ankle, with pins and needles feeling in her big toe and last two toes on right foot. Mild pain to left foot, pins and needles feeling in her left big toe; she states this is an ongoing issue but symptoms have gotten worse over the past couple of weeks, see triage assessment below. She states after triage, notes that she has right ankle swelling. Care Advice provided; patient acknowledged understanding of care advice and is in agreement with plan. Call soft transferred to [facility] to schedule appointment. Please do not reply to the triage nurse through this encounter. Any subsequent communication should be directly with the patient. Reason for Disposition   SEVERE pain (e.g., excruciating, unable to do any normal activities)    Answer Assessment - Initial Assessment Questions  1. ONSET: \"When did the pain start? \"       About two weeks ago  2. LOCATION: \"Where is the pain located? \"      Both feet, right worse than left  3. PAIN: \"How bad is the pain? \"    (Scale 1-10; or mild, moderate, severe)    -  MILD (1-3): doesn't interfere with normal activities     -  MODERATE (4-7): interferes with normal activities (e.g., work or school) or awakens from sleep, limping     -  SEVERE (8-10): excruciating pain, unable to do any normal activities, unable to walk      Severe 9/10  4. WORK OR EXERCISE: \"Has there been any recent work or exercise that involved this part of the body? \"       denies  5. CAUSE: \"What do you think is causing the foot pain? \"     Neuropathy  6. OTHER SYMPTOMS: \"Do you have any other symptoms? \" (e.g., leg pain, rash, fever, numbness)      Right leg pain, thigh 7/10; numbness in right foot  7. PREGNANCY: \"Is there any chance you are pregnant? \" \"When was your last menstrual period? \"      NA    Protocols used: FOOT PAIN-ADULT-[place]

## 2020-06-23 ENCOUNTER — OFFICE VISIT (OUTPATIENT)
Dept: NEUROLOGY | Age: 60
End: 2020-06-23
Payer: MEDICAID

## 2020-06-23 VITALS
SYSTOLIC BLOOD PRESSURE: 148 MMHG | HEIGHT: 63 IN | WEIGHT: 186 LBS | TEMPERATURE: 97.9 F | BODY MASS INDEX: 32.96 KG/M2 | DIASTOLIC BLOOD PRESSURE: 80 MMHG | HEART RATE: 74 BPM

## 2020-06-23 PROCEDURE — 1036F TOBACCO NON-USER: CPT | Performed by: PSYCHIATRY & NEUROLOGY

## 2020-06-23 PROCEDURE — 99204 OFFICE O/P NEW MOD 45 MIN: CPT | Performed by: PSYCHIATRY & NEUROLOGY

## 2020-06-23 PROCEDURE — 3017F COLORECTAL CA SCREEN DOC REV: CPT | Performed by: PSYCHIATRY & NEUROLOGY

## 2020-06-23 PROCEDURE — G8427 DOCREV CUR MEDS BY ELIG CLIN: HCPCS | Performed by: PSYCHIATRY & NEUROLOGY

## 2020-06-23 PROCEDURE — G8417 CALC BMI ABV UP PARAM F/U: HCPCS | Performed by: PSYCHIATRY & NEUROLOGY

## 2020-06-23 RX ORDER — GABAPENTIN 600 MG/1
TABLET ORAL
Qty: 60 TABLET | Refills: 2 | Status: SHIPPED | OUTPATIENT
Start: 2020-06-23 | End: 2020-08-20 | Stop reason: SDUPTHER

## 2020-06-23 ASSESSMENT — ENCOUNTER SYMPTOMS
COUGH: 1
ALLERGIC/IMMUNOLOGIC NEGATIVE: 1
BACK PAIN: 1

## 2020-06-23 NOTE — PROGRESS NOTES
Subjective:      Patient ID: Tomasa Bourne is a 60 y.o. female. HPI  59 yo bf with leg numbness . She has had numbness of right leg for the past one year . She has diabetes for 3 years being on metformin and glimepiride and trulicity . Numbness began in the right lateral thigh that was associated with burning which is there all the time quite sensitive being unable to lay on that side . There is also numbness in outer part of top of right foot and right outer three toes . There is low back pain in bilateral low back for the past year of aching quality at baseline grade 6 over 10 which will increase with activity or prolonged sitting going down outer right thigh and calf going to foot to outer right three toes . She is on neurontin 300 mg po bid which will dull right lateral thigh pain burning not helping with low back pain . At night she will have restless legs only partially attenuated with neuronin . There are no bowel or bladder problems . She had PT for low back summer of 2019 which made pain worse having thereon aquatic therapy . Significant medications neurontin 300 mg po bid .  Testing Hga1c 6.8 , October 2019      Past Medical History:   Diagnosis Date    Arthritis     Bulging disc 07/13    c4 c5    Cerebral artery occlusion with cerebral infarction ([address]     tia    Depression 10/3/2014    Diabetic neuropathy ([facility] [address]     all L.L.    Glaucoma     LT EYE    History of blood transfusion     [address] Hypertension     Irregular heart beat     hx of    TIA (transient ischemic attack) 07/22/13    Type II or unspecified type diabetes mellitus without mention of complication, not stated as uncontrolled     Wears glasses     for reading       Past Surgical History:   Procedure Laterality Date    APPENDECTOMY  09/02/2005    BLADDER SURGERY  09/02/2005    Bladder tear. , PT NOT AWARE OF THIS    BREAST SURGERY Right     BIOPSY    CARDIAC SURGERY      CARDIAC CATH 2000 WAS NEG    Stress: None   Relationships    Social connections     Talks on phone: None     Gets together: None     Attends Christianity service: None     Active member of club or organization: None     Attends meetings of clubs or organizations: None     Relationship status: None    Intimate partner violence     Fear of current or ex partner: None     Emotionally abused: None     Physically abused: None     Forced sexual activity: None   Other Topics Concern    None   Social History Narrative    None       Current Outpatient Medications   Medication Sig Dispense Refill    Dulaglutide (TRULICITY) 1.99 XI/3.5YJ SOPN inject 0.5 milliliter INTO THE SKIN every week 2 mL 1    losartan (COZAAR) 50 MG tablet take 1 tablet by mouth once daily 90 tablet 0    gabapentin (NEURONTIN) 300 MG capsule take 2 capsules by mouth nightly 60 capsule 0    metFORMIN (GLUCOPHAGE) 1000 MG tablet take 1 tablet by mouth once daily (PATIENT TAKES AT NIGHT) 90 tablet 0    atorvastatin (LIPITOR) 10 MG tablet Take 1 tablet by mouth daily 90 tablet 0    blood glucose test strips (ONE TOUCH ULTRA TEST) strip Use once daily to check  strip 0    Lancets 30G MISC 1 each by Does not apply route daily Use to check BS BID Dx E 11.9 100 each 0    vitamin D (ERGOCALCIFEROL) 1.25 MG (72745 UT) CAPS capsule take 1 capsule by mouth every week 6 capsule 0    traZODone (DESYREL) 50 MG tablet take 1 tablet by mouth at bedtime  0    diclofenac sodium 1 % GEL   0    ARIPiprazole (ABILIFY) 5 MG tablet take 1 tablet by mouth once daily  0    Blood Glucose Monitoring Suppl (ONE TOUCH ULTRA 2) w/Device KIT use as directed DUE TO PATIENTS INSURANCE, WE NEED TO GET A NEW METER FOR ONE TOUCH, DUE TO INS. [employer] 1 kit 0    aspirin 81 MG tablet Take 81 mg by mouth daily.       L-methylfolate-B6-B12 (METANX) 7-15.850-8-68 MG CAPS capsule Take 1 capsule by mouth 2 times daily (Patient not taking: Reported on 6/23/2020) 60 capsule 5    glimepiride (AMARYL) 4 MG tablet

## 2020-06-24 ENCOUNTER — OFFICE VISIT (OUTPATIENT)
Dept: PODIATRY | Age: 60
End: 2020-06-24
Payer: MEDICAID

## 2020-06-24 VITALS — WEIGHT: 186 LBS | BODY MASS INDEX: 32.96 KG/M2 | HEIGHT: 63 IN

## 2020-06-24 PROCEDURE — 1036F TOBACCO NON-USER: CPT | Performed by: PODIATRIST

## 2020-06-24 PROCEDURE — 3017F COLORECTAL CA SCREEN DOC REV: CPT | Performed by: PODIATRIST

## 2020-06-24 PROCEDURE — G8428 CUR MEDS NOT DOCUMENT: HCPCS | Performed by: PODIATRIST

## 2020-06-24 PROCEDURE — G8417 CALC BMI ABV UP PARAM F/U: HCPCS | Performed by: PODIATRIST

## 2020-06-24 PROCEDURE — 3046F HEMOGLOBIN A1C LEVEL >9.0%: CPT | Performed by: PODIATRIST

## 2020-06-24 PROCEDURE — 2022F DILAT RTA XM EVC RTNOPTHY: CPT | Performed by: PODIATRIST

## 2020-06-24 PROCEDURE — 99213 OFFICE O/P EST LOW 20 MIN: CPT | Performed by: PODIATRIST

## 2020-06-24 RX ORDER — CLOTRIMAZOLE AND BETAMETHASONE DIPROPIONATE 10; .64 MG/G; MG/G
CREAM TOPICAL
Qty: 45 G | Refills: 2 | Status: SHIPPED | OUTPATIENT
Start: 2020-06-24

## 2020-06-24 ASSESSMENT — ENCOUNTER SYMPTOMS
CONSTIPATION: 0
NAUSEA: 0
COLOR CHANGE: 0
VOMITING: 0
DIARRHEA: 0

## 2020-06-24 NOTE — PROGRESS NOTES
[facility]  Return Patient     Ree Stands 60 y.o. female that presents for follow-up of   Chief Complaint   Patient presents with    Diabetes     diabetic foot check/last saw Tino Dominguezlalit 6/2/2020    Other     last a1c 6.6    Medication Refill     diclofenac gel     She is still having pain in both feet, both legs, left is worse. Pain getting worse. Nerve testing was essentially normal. She did seen neurology yesterday. Dr. Ha Walters ordered PT, increased neurontin to 600 mg BID, and ordered a back xray. The bump on her left foot is bothering her. It tends to grow and shrink, had a cyst removed 20 years ago, I have injected it in the past. She would like it removed again    Peeling skin both feet. Some itching. F/C/N/V. Allergies   Allergen Reactions    Lisinopril Shortness Of Breath     SOB and cough    Codeine Hives       Past Medical History:   Diagnosis Date    Arthritis     Bulging disc 07/13    c4 c5    Cerebral artery occlusion with cerebral infarction ([facility] [address]     tia    Depression 10/3/2014    Diabetic neuropathy ([facility] [address]     all L.L.    Glaucoma     LT EYE    History of blood transfusion     [facility]    Hypertension     Irregular heart beat     hx of    TIA (transient ischemic attack) 07/22/13    Type II or unspecified type diabetes mellitus without mention of complication, not stated as uncontrolled     Wears glasses     for reading       Prior to Admission medications    Medication Sig Start Date End Date Taking? Authorizing Provider   diclofenac sodium (VOLTAREN) 1 % GEL Apply topically 4 times daily as needed for Pain 6/24/20  Yes Sukhjinder Pang DPM   clotrimazole-betamethasone (LOTRISONE) 1-0.05 % cream Apply topically 2 times daily.  6/24/20  Yes Yfn Adamson DPM   gabapentin (NEURONTIN) 600 MG tablet Take 1 po bid 6/23/20 9/23/20 Yes Gema Srivastava MD   M-uxafcicvgizd-H8-B12 [facility]) 6-52.950-7-55 MG CAPS capsule Take 1 capsule by were all listed on the consent. Additionally, the postoperative course and treatment was outlined for the patient. Discussion consisted of postoperatively the patient needs to keep the foot elevated for at least the first initial two weeks. I have encouraged movements such as moving from the bed to the sofa or recliner, to the kitchen and the bathroom; quick bursts of movement with the foot elevated. Longstanding periods of time such as cooking, cleaning, and shopping are not permitted. I reminded the patient that there are only two reasons to have surgery. That being, if their function is impaired and also if they are having pain. If they can answer yes to both these questions, I will move forward with surgery. If they can not, there is no reason to proceed with surgical intervention. No orders of the defined types were placed in this encounter. Orders Placed This Encounter   Medications    diclofenac sodium (VOLTAREN) 1 % GEL     Sig: Apply topically 4 times daily as needed for Pain     Dispense:  1 Tube     Refill:  0    clotrimazole-betamethasone (LOTRISONE) 1-0.05 % cream     Sig: Apply topically 2 times daily.      Dispense:  45 g     Refill:  2        RTC in 6week(s).    6/24/2020

## 2020-07-20 ENCOUNTER — HOSPITAL ENCOUNTER (OUTPATIENT)
Dept: PREADMISSION TESTING | Age: 60
Discharge: HOME | End: 2020-07-24
Payer: MEDICAID

## 2020-07-20 VITALS
WEIGHT: 186 LBS | TEMPERATURE: 97.4 F | HEIGHT: 63 IN | RESPIRATION RATE: 18 BRPM | HEART RATE: 80 BPM | SYSTOLIC BLOOD PRESSURE: 117 MMHG | OXYGEN SATURATION: 100 % | BODY MASS INDEX: 32.96 KG/M2 | DIASTOLIC BLOOD PRESSURE: 55 MMHG

## 2020-07-20 LAB
ABSOLUTE EOS #: 0.2 K/UL (ref 0–0.4)
ABSOLUTE IMMATURE GRANULOCYTE: ABNORMAL K/UL (ref 0–0.3)
ABSOLUTE LYMPH #: 2.7 K/UL (ref 1–4.8)
ABSOLUTE MONO #: 0.4 K/UL (ref 0.1–1.3)
ANION GAP SERPL CALCULATED.3IONS-SCNC: 12 MMOL/L (ref 9–17)
BASOPHILS # BLD: 1 % (ref 0–2)
BASOPHILS ABSOLUTE: 0 K/UL (ref 0–0.2)
BUN BLDV-MCNC: 20 MG/DL (ref 8–23)
BUN/CREAT BLD: ABNORMAL (ref 9–20)
CALCIUM SERPL-MCNC: 9.7 MG/DL (ref 8.6–10.4)
CHLORIDE BLD-SCNC: 105 MMOL/L (ref 98–107)
CO2: 28 MMOL/L (ref 20–31)
CREAT SERPL-MCNC: 0.72 MG/DL (ref 0.5–0.9)
DIFFERENTIAL TYPE: ABNORMAL
EOSINOPHILS RELATIVE PERCENT: 3 % (ref 0–4)
GFR AFRICAN AMERICAN: >60 ML/MIN
GFR NON-AFRICAN AMERICAN: >60 ML/MIN
GFR SERPL CREATININE-BSD FRML MDRD: ABNORMAL ML/MIN/{1.73_M2}
GFR SERPL CREATININE-BSD FRML MDRD: ABNORMAL ML/MIN/{1.73_M2}
GLUCOSE BLD-MCNC: 151 MG/DL (ref 70–99)
HCT VFR BLD CALC: 38.8 % (ref 36–46)
HEMOGLOBIN: 12.5 G/DL (ref 12–16)
IMMATURE GRANULOCYTES: ABNORMAL %
LYMPHOCYTES # BLD: 43 % (ref 24–44)
MCH RBC QN AUTO: 28.8 PG (ref 26–34)
MCHC RBC AUTO-ENTMCNC: 32.1 G/DL (ref 31–37)
MCV RBC AUTO: 89.5 FL (ref 80–100)
MONOCYTES # BLD: 6 % (ref 1–7)
NRBC AUTOMATED: ABNORMAL PER 100 WBC
PDW BLD-RTO: 16 % (ref 11.5–14.9)
PLATELET # BLD: 265 K/UL (ref 150–450)
PLATELET ESTIMATE: ABNORMAL
PMV BLD AUTO: 10 FL (ref 6–12)
POTASSIUM SERPL-SCNC: 4.4 MMOL/L (ref 3.7–5.3)
RBC # BLD: 4.33 M/UL (ref 4–5.2)
RBC # BLD: ABNORMAL 10*6/UL
SEG NEUTROPHILS: 47 % (ref 36–66)
SEGMENTED NEUTROPHILS ABSOLUTE COUNT: 2.9 K/UL (ref 1.3–9.1)
SODIUM BLD-SCNC: 145 MMOL/L (ref 135–144)
WBC # BLD: 6.3 K/UL (ref 3.5–11)
WBC # BLD: ABNORMAL 10*3/UL

## 2020-07-20 PROCEDURE — 36415 COLL VENOUS BLD VENIPUNCTURE: CPT

## 2020-07-20 PROCEDURE — 80048 BASIC METABOLIC PNL TOTAL CA: CPT

## 2020-07-20 PROCEDURE — 85025 COMPLETE CBC W/AUTO DIFF WBC: CPT

## 2020-07-20 PROCEDURE — 93005 ELECTROCARDIOGRAM TRACING: CPT | Performed by: ANESTHESIOLOGY

## 2020-07-20 ASSESSMENT — PAIN DESCRIPTION - LOCATION: LOCATION: LEG

## 2020-07-20 ASSESSMENT — PAIN DESCRIPTION - PAIN TYPE: TYPE: CHRONIC PAIN

## 2020-07-20 ASSESSMENT — PAIN SCALES - GENERAL: PAINLEVEL_OUTOF10: 9

## 2020-07-20 ASSESSMENT — PAIN DESCRIPTION - ORIENTATION: ORIENTATION: RIGHT;LEFT

## 2020-07-20 NOTE — H&P (VIEW-ONLY)
HISTORY and Treamisha Zhou [address]       NAME:  Jessica Silverio  MRN: [id number]   YOB: 1960   Date: 7/20/2020   Age: [address] y.o. Gender: female       COMPLAINT AND PRESENT HISTORY:     Jessica Silverio is [address] [facility] y.o.,  female, here for left foot ganglion dorsal spur with scheduled left foot excision ganglion exostectomy. The symptoms started 30 years ago. Pain is progressively worsening. Pt c/o pain to left dorsal and medial surface of foot. Describes pain as throbbing. Rating pain 9/10. Takes motrin and gabapentin with moderate relief. Standing or putting pressure on foot makes pain worse. Nothing helps alleviate pain. Reports radiation of pain up to left knee. Pain awakens patient at night. No recent falls, injury or trauma to left foot. No redness, swelling or rashes. Previous surgery on left foot to remove cyst many years ago. PMHx  Diabetic neuropathy to both feet and hands. Takes gabapentin. TIA in 2013: no residual symptoms. HTN: Denies any recent chest pain/pressure, SOB or dizziness. Does have history of chronic headaches that occur mostly upon waking in the morning. Does not take anything for relief. DM2: with last A1C 6.8 in Oct 2019. Does not check BS daily. Takes metformin and trulicity. Reports intermittent palpitations. Pt sees a [address] cardiologist. Had echocardiogram on 07/17/2020 revealing EF 55-60%, mild left ventricular hypertrophy and mildly thickened mitral valve leaflets. Has upcoming follow up appointment with cardiology. Pt reports a chronic intermittent, nonproductive cough for last several years. Denies any other somatic complaints.      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    Glaucoma LT EYE    History of blood transfusion      AFTER CHILDBIRTH    Hyperlipidemia     Hypertension     Irregular heart beat     hx of    TIA (transient ischemic attack) 13    Type II or unspecified type diabetes mellitus without mention of complication, not stated as uncontrolled     Wears glasses     for reading     Pt denies any history of Diabetes mellitus type 2, hypertension, stroke, heart disease, COPD, Asthma, GERD, HLD, Cancer, Seizures,Thyroid disease, Kidney Disease, Hepatitis, TB, Psychiatric Disorders or Substance abuse.     SURGICAL HISTORY       Past Surgical History:   Procedure Laterality Date    APPENDECTOMY  2005    BLADDER SURGERY  2005    Bladder tear. , PT NOT AWARE OF THIS    BREAST SURGERY Right     BIOPSY    CARDIAC SURGERY      CARDIAC CATH  WAS NEG     SECTION, LOW TRANSVERSE      x3    CHOLECYSTECTOMY      COLONOSCOPY      negative    COLONOSCOPY  2019    COLONOSCOPY N/A 2019    COLORECTAL CANCER SCREENING, NOT HIGH RISK performed by Obdulia Villalobos MD at [facility] 2019    AVULSION LEFT HALLUX TOENAIL AND INJECTION OF LEFT FOOT CYST performed by Christina Bernard DPM at . [address]      as a child    HYSTERECTOMY  2005    Supracervical abd hyst. Cervix remains    OTHER SURGICAL HISTORY      cyst removed back of head    SMALL INTESTINE SURGERY      removed adhesions    SPLENECTOMY      part of pancreas removed    TOENAIL AVULSION Left 10/24/2016    2ND TOE    WISDOM TOOTH EXTRACTION         FAMILY HISTORY       Family History   Problem Relation Age of Onset   [place] Cancer Father         lung    COPD Mother     Heart Disease Mother         chf    Cancer Paternal Grandfather         prostate    Cancer Paternal Grandmother         uterine    Diabetes Maternal Grandfather     Heart Disease Maternal Grandfather     Hypertension Maternal Grandfather     Cancer Sister 1 capsule by mouth 2 times daily 60 capsule 5    Dulaglutide (TRULICITY) 1.12 PJ/8.7GQ SOPN inject 0.5 milliliter INTO THE SKIN every week (Patient taking differently: Indications: on Sunday inject 0.5 milliliter INTO THE SKIN every week) 2 mL 1    losartan (COZAAR) 50 MG tablet take 1 tablet by mouth once daily 90 tablet 0    metFORMIN (GLUCOPHAGE) 1000 MG tablet take 1 tablet by mouth once daily (PATIENT TAKES AT NIGHT) 90 tablet 0    atorvastatin (LIPITOR) 10 MG tablet Take 1 tablet by mouth daily 90 tablet 0    vitamin D (ERGOCALCIFEROL) 1.25 MG (79518 UT) CAPS capsule take 1 capsule by mouth every week (Patient taking differently: Indications: on Sunday take 1 capsule by mouth every week) 6 capsule 0    traZODone (DESYREL) 50 MG tablet take 1 tablet by mouth at bedtime  0    ARIPiprazole (ABILIFY) 5 MG tablet take 1 tablet by mouth once daily  0    aspirin 81 MG tablet Take 81 mg by mouth daily.  blood glucose test strips (ONE TOUCH ULTRA TEST) strip Use once daily to check  strip 0    Lancets 30G MISC 1 each by Does not apply route daily Use to check BS BID Dx E 11.9 100 each 0    Blood Glucose Monitoring Suppl (ONE TOUCH ULTRA 2) w/Device KIT use as directed DUE TO PATIENTS INSURANCE, WE NEED TO GET A NEW METER FOR ONE TOUCH, DUE TO INS. [employer] 1 kit 0     No current facility-administered medications on file prior to encounter. General health:  Fairly good. No fever or chills. Skin:  No itching, redness or rash. HEENT:  No headache, epistaxis or sore throat. Neck:  No pain, stiffness or masses. Cardiovascular/Respiratory system:  No chest pain, palpitation or shortness of breath. Gastrointestinal tract: No abdominal pain, Dysphagia, nausea, vomiting, diarrhea or constipation. Genitourinary:  No burning on micturition. No hesitancy, urgency, frequency or discoloration of urine. Locomotor:  See HPI. Neuropsychiatric:  No referable complaints. GENERAL PHYSICAL EXAM:     Vitals: BP (!) 117/55   Pulse 80   Temp 97.4 °F (36.3 °C) (Infrared)   Resp 18   Ht 5' 3\" (1.6 m)   Wt 186 lb (84.4 kg)   SpO2 100%   BMI 32.95 kg/m²  Body mass index is 32.95 kg/m². GENERAL APPEARANCE:   Angela Hopkins is 60 y.o.,  female, mildly obese, nourished, conscious, alert. Does not appear to be in any distress or pain at this time. SKIN:  Warm, dry, no cyanosis or jaundice. HEAD:  Normocephalic, atraumatic. EYES:  Pupils equal, reactive to light. EARS:  No discharge, no marked hearing loss. NOSE:  No rhinorrhea, epistaxis or septal deformity. THROAT:  Mucus membranes moist, no erythema or exudate. NECK:  No stiffness, trachea central.  No palpable masses or L.N.                 CHEST:  Symmetrical and equal on expansion. HEART:  RRR S1 > S2. No audible murmurs or gallops. LUNGS:  Equal on expansion, normal breath sounds. No wheezing, rhonchi or rales. ABDOMEN:  NABS x 4 quads. Soft on palpation. No localized tenderness. No guarding or rigidity. LYMPHATICS:  No palpable cervical lymphadenopathy. LOCOMOTOR, BACK AND SPINE:  No tenderness or deformities. EXTREMITIES:  Left foot firm, non mobile, nodule to dorsal surface. Symmetrical, no pretibial edema. No calf tenderness. No discoloration or ulcerations. NEUROLOGIC:  The patient is conscious, alert, oriented. Speech clear, no facial drooping. No apparent focal sensory or motor deficits.                                                                                      PROVISIONAL DIAGNOSES / SURGERY:      GANGLION LEFT FOOTDORSAL SPUR LEFT FOOT    EXCISION GANGLION FOOT/EXOSTECTOMY DORSAL FOOT    Patient Active Problem List    Diagnosis Date Noted    Severe major depression with psychotic features ([facility] 10/08/2019    Low back pain 05/23/2019    Vitamin D deficiency 05/09/2019    Type 2 diabetes mellitus, without long-term current use of insulin ([facility] 05/09/2019    Pain of great toe, left     Ganglion cyst of left foot     Type 2 diabetes mellitus with complication, without long-term current use of insulin [facility] 01/18/2019    Primary insomnia 12/03/2018    Hyperlipidemia 04/16/2018    Positive cardiac stress test 04/04/2018    History of TIA (transient ischemic attack) 03/28/2018    Chest pain 03/28/2018    Abnormal echocardiogram 03/28/2018    Neuropathy 03/22/2018    Bone spur of left foot     Pain, foot, left, chronic     Onychomycosis     Depression 10/03/2014    HTN (hypertension) 06/26/2014    Glaucoma 06/26/2014    DM (diabetes mellitus), type 2 ([facility] 08/21/2013           FAY Russell - CNP on 7/20/2020 at 1:19 PM

## 2020-07-20 NOTE — H&P
HISTORY and [address]       NAME:  Cruz Jarrell  MRN: [id number]   YOB: 1960   Date: 7/20/2020   Age: 60 y.o. Gender: female       COMPLAINT AND PRESENT HISTORY:     Cruz Jarrell is 60 y.o.,  female, here for left foot ganglion dorsal spur with scheduled left foot excision ganglion exostectomy. The symptoms started 30 years ago. Pain is progressively worsening. Pt c/o pain to left dorsal and medial surface of foot. Describes pain as throbbing. Rating pain 9/10. Takes motrin and gabapentin with moderate relief. Standing or putting pressure on foot makes pain worse. Nothing helps alleviate pain. Reports radiation of pain up to left knee. Pain awakens patient at night. No recent falls, injury or trauma to left foot. No redness, swelling or rashes. Previous surgery on left foot to remove cyst many years ago. PMHx  Diabetic neuropathy to both feet and hands. Takes gabapentin. TIA in 2013: no residual symptoms. HTN: Denies any recent chest pain/pressure, SOB or dizziness. Does have history of chronic headaches that occur mostly upon waking in the morning. Does not take anything for relief. DM2: with last A1C 6.8 in Oct 2019. Does not check BS daily. Takes metformin and trulicity. Reports intermittent palpitations. Pt sees a [facility] cardiologist. Had echocardiogram on 07/17/2020 revealing EF 55-60%, mild left ventricular hypertrophy and mildly thickened mitral valve leaflets. Has upcoming follow up appointment with cardiology. Pt reports a chronic intermittent, nonproductive cough for last several years. Denies any other somatic complaints.      PAST MEDICAL HISTORY     Past Medical History:   Diagnosis Date    Arthritis     Bulging disc 07/13    c4 c5    Cancer [facility])     uterus cells found    Cerebral artery occlusion with cerebral infarction ([facility] [address]     tia    Depression 10/3/2014    Diabetic neuropathy (HCC)     all L.L.    Glaucoma stomach    Hypertension Maternal Aunt     Cancer Maternal Aunt         bone    Cancer Sister         breast with bilateral mastectomy    Cancer Maternal Cousin         stomach or colon? SOCIAL HISTORY       Social History     Socioeconomic History    Marital status: Single     Spouse name: None    Number of children: None    Years of education: None    Highest education level: None   Occupational History    None   Social Needs    Financial resource strain: None    Food insecurity     Worry: None     Inability: None    Transportation needs     Medical: None     Non-medical: None   Tobacco Use    Smoking status: Never Smoker    Smokeless tobacco: Never Used   Substance and Sexual Activity    Alcohol use: Yes     Comment: rare    Drug use: No    Sexual activity: Never   Lifestyle    Physical activity     Days per week: None     Minutes per session: None    Stress: None   Relationships    Social connections     Talks on phone: None     Gets together: None     Attends Tenriism service: None     Active member of club or organization: None     Attends meetings of clubs or organizations: None     Relationship status: None    Intimate partner violence     Fear of current or ex partner: None     Emotionally abused: None     Physically abused: None     Forced sexual activity: None   Other Topics Concern    None   Social History Narrative    None        REVIEW OF SYSTEMS      Allergies   Allergen Reactions    Lisinopril Shortness Of Breath     SOB and cough    Codeine Hives       Current Outpatient Medications on File Prior to Encounter   Medication Sig Dispense Refill    diclofenac sodium (VOLTAREN) 1 % GEL Apply topically 4 times daily as needed for Pain 1 Tube 0    clotrimazole-betamethasone (LOTRISONE) 1-0.05 % cream Apply topically 2 times daily.  45 g 2    gabapentin (NEURONTIN) 600 MG tablet Take 1 po bid 60 tablet 2    L-methylfolate-B6-B12 (METANX) 1-59.678-3-47 MG CAPS capsule Take 1 capsule by mouth 2 times daily 60 capsule 5    Dulaglutide (TRULICITY) 3.13 HI/9.2KG SOPN inject 0.5 milliliter INTO THE SKIN every week (Patient taking differently: Indications: on Sunday inject 0.5 milliliter INTO THE SKIN every week) 2 mL 1    losartan (COZAAR) 50 MG tablet take 1 tablet by mouth once daily 90 tablet 0    metFORMIN (GLUCOPHAGE) 1000 MG tablet take 1 tablet by mouth once daily (PATIENT TAKES AT NIGHT) 90 tablet 0    atorvastatin (LIPITOR) 10 MG tablet Take 1 tablet by mouth daily 90 tablet 0    vitamin D (ERGOCALCIFEROL) 1.25 MG (61516 UT) CAPS capsule take 1 capsule by mouth every week (Patient taking differently: Indications: on Sunday take 1 capsule by mouth every week) 6 capsule 0    traZODone (DESYREL) 50 MG tablet take 1 tablet by mouth at bedtime  0    ARIPiprazole (ABILIFY) 5 MG tablet take 1 tablet by mouth once daily  0    aspirin 81 MG tablet Take 81 mg by mouth daily.  blood glucose test strips (ONE TOUCH ULTRA TEST) strip Use once daily to check  strip 0    Lancets 30G MISC 1 each by Does not apply route daily Use to check BS BID Dx E 11.9 100 each 0    Blood Glucose Monitoring Suppl (ONE TOUCH ULTRA 2) w/Device KIT use as directed DUE TO PATIENTS INSURANCE, WE NEED TO GET A NEW METER FOR ONE TOUCH, DUE TO INS. [employer] 1 kit 0     No current facility-administered medications on file prior to encounter. General health:  Fairly good. No fever or chills. Skin:  No itching, redness or rash. HEENT:  No headache, epistaxis or sore throat. Neck:  No pain, stiffness or masses. Cardiovascular/Respiratory system:  No chest pain, palpitation or shortness of breath. Gastrointestinal tract: No abdominal pain, Dysphagia, nausea, vomiting, diarrhea or constipation. Genitourinary:  No burning on micturition. No hesitancy, urgency, frequency or discoloration of urine. Locomotor:  See HPI. Neuropsychiatric:  No referable complaints. GENERAL PHYSICAL EXAM:     Vitals: BP (!) 117/55   Pulse 80   Temp 97.4 °F (36.3 °C) (Infrared)   Resp 18   Ht 5' 3\" (1.6 m)   Wt 186 lb (84.4 kg)   SpO2 100%   BMI 32.95 kg/m²  Body mass index is 32.95 kg/m². GENERAL APPEARANCE:   Janet Mariano is 60 y.o.,  female, mildly obese, nourished, conscious, alert. Does not appear to be in any distress or pain at this time. SKIN:  Warm, dry, no cyanosis or jaundice. HEAD:  Normocephalic, atraumatic. EYES:  Pupils equal, reactive to light. EARS:  No discharge, no marked hearing loss. NOSE:  No rhinorrhea, epistaxis or septal deformity. THROAT:  Mucus membranes moist, no erythema or exudate. NECK:  No stiffness, trachea central.  No palpable masses or L.N.                 CHEST:  Symmetrical and equal on expansion. HEART:  RRR S1 > S2. No audible murmurs or gallops. LUNGS:  Equal on expansion, normal breath sounds. No wheezing, rhonchi or rales. ABDOMEN:  NABS x 4 quads. Soft on palpation. No localized tenderness. No guarding or rigidity. LYMPHATICS:  No palpable cervical lymphadenopathy. LOCOMOTOR, BACK AND SPINE:  No tenderness or deformities. EXTREMITIES:  Left foot firm, non mobile, nodule to dorsal surface. Symmetrical, no pretibial edema. No calf tenderness. No discoloration or ulcerations. NEUROLOGIC:  The patient is conscious, alert, oriented. Speech clear, no facial drooping. No apparent focal sensory or motor deficits.                                                                                      PROVISIONAL DIAGNOSES / SURGERY:      GANGLION LEFT FOOTDORSAL SPUR LEFT FOOT    EXCISION GANGLION FOOT/EXOSTECTOMY DORSAL FOOT    Patient Active Problem List    Diagnosis Date Noted    Severe major depression with psychotic features ([facility] 10/08/2019    Low back pain 05/23/2019    Vitamin D deficiency 05/09/2019    Type 2 diabetes mellitus, without long-term current use of insulin ([facility] [address] 05/09/2019    Pain of great toe, left     Ganglion cyst of left foot     Type 2 diabetes mellitus with complication, without long-term current use of insulin ([facility] 01/18/2019    Primary insomnia 12/03/2018    Hyperlipidemia 04/16/2018    Positive cardiac stress test 04/04/2018    History of TIA (transient ischemic attack) 03/28/2018    Chest pain 03/28/2018    Abnormal echocardiogram 03/28/2018    Neuropathy 03/22/2018    Bone spur of left foot     Pain, foot, left, chronic     Onychomycosis     Depression 10/03/2014    HTN (hypertension) 06/26/2014    Glaucoma 06/26/2014    DM (diabetes mellitus), type 2 ([facility] 08/21/2013           FAY Naqvi - CNP on 7/20/2020 at 1:19 PM

## 2020-07-22 LAB
EKG ATRIAL RATE: 78 BPM
EKG P AXIS: 31 DEGREES
EKG P-R INTERVAL: 148 MS
EKG Q-T INTERVAL: 384 MS
EKG QRS DURATION: 88 MS
EKG QTC CALCULATION (BAZETT): 437 MS
EKG R AXIS: -9 DEGREES
EKG T AXIS: 9 DEGREES
EKG VENTRICULAR RATE: 78 BPM

## 2020-07-30 ENCOUNTER — HOSPITAL ENCOUNTER (OUTPATIENT)
Dept: PREADMISSION TESTING | Age: 60
Setting detail: SPECIMEN
Discharge: HOME | End: 2020-08-03
Payer: MEDICAID

## 2020-07-30 PROCEDURE — U0003 INFECTIOUS AGENT DETECTION BY NUCLEIC ACID (DNA OR RNA); SEVERE ACUTE RESPIRATORY SYNDROME CORONAVIRUS 2 (SARS-COV-2) (CORONAVIRUS DISEASE [COVID-19]), AMPLIFIED PROBE TECHNIQUE, MAKING USE OF HIGH THROUGHPUT TECHNOLOGIES AS DESCRIBED BY CMS-2020-01-R: HCPCS

## 2020-08-02 LAB — SARS-COV-2, NAA: NOT DETECTED

## 2020-08-03 ENCOUNTER — APPOINTMENT (OUTPATIENT)
Dept: GENERAL RADIOLOGY | Age: 60
End: 2020-08-03
Attending: PODIATRIST
Payer: MEDICAID

## 2020-08-03 ENCOUNTER — ANESTHESIA (OUTPATIENT)
Dept: OPERATING ROOM | Age: 60
End: 2020-08-03
Payer: MEDICAID

## 2020-08-03 ENCOUNTER — HOSPITAL ENCOUNTER (OUTPATIENT)
Age: 60
Setting detail: OUTPATIENT SURGERY
Discharge: HOME | End: 2020-08-03
Attending: PODIATRIST | Admitting: PODIATRIST
Payer: MEDICAID

## 2020-08-03 ENCOUNTER — ANESTHESIA EVENT (OUTPATIENT)
Dept: OPERATING ROOM | Age: 60
End: 2020-08-03
Payer: MEDICAID

## 2020-08-03 VITALS
RESPIRATION RATE: 18 BRPM | HEART RATE: 77 BPM | TEMPERATURE: 96.6 F | WEIGHT: 186 LBS | DIASTOLIC BLOOD PRESSURE: 77 MMHG | SYSTOLIC BLOOD PRESSURE: 160 MMHG | HEIGHT: 63 IN | OXYGEN SATURATION: 99 % | BODY MASS INDEX: 32.96 KG/M2

## 2020-08-03 VITALS
DIASTOLIC BLOOD PRESSURE: 60 MMHG | OXYGEN SATURATION: 99 % | RESPIRATION RATE: 14 BRPM | SYSTOLIC BLOOD PRESSURE: 140 MMHG

## 2020-08-03 LAB
GLUCOSE BLD-MCNC: 117 MG/DL (ref 65–105)
GLUCOSE BLD-MCNC: 89 MG/DL (ref 65–105)

## 2020-08-03 PROCEDURE — 7100000001 HC PACU RECOVERY - ADDTL 15 MIN: Performed by: PODIATRIST

## 2020-08-03 PROCEDURE — 6360000002 HC RX W HCPCS: Performed by: ANESTHESIOLOGY

## 2020-08-03 PROCEDURE — 82947 ASSAY GLUCOSE BLOOD QUANT: CPT

## 2020-08-03 PROCEDURE — 28122 PARTIAL REMOVAL OF FOOT BONE: CPT | Performed by: PODIATRIST

## 2020-08-03 PROCEDURE — 3700000001 HC ADD 15 MINUTES (ANESTHESIA): Performed by: PODIATRIST

## 2020-08-03 PROCEDURE — 2709999900 HC NON-CHARGEABLE SUPPLY: Performed by: PODIATRIST

## 2020-08-03 PROCEDURE — 7100000000 HC PACU RECOVERY - FIRST 15 MIN: Performed by: PODIATRIST

## 2020-08-03 PROCEDURE — 7100000030 HC ASPR PHASE II RECOVERY - FIRST 15 MIN: Performed by: PODIATRIST

## 2020-08-03 PROCEDURE — 3600000012 HC SURGERY LEVEL 2 ADDTL 15MIN: Performed by: PODIATRIST

## 2020-08-03 PROCEDURE — 2500000003 HC RX 250 WO HCPCS: Performed by: NURSE ANESTHETIST, CERTIFIED REGISTERED

## 2020-08-03 PROCEDURE — 73630 X-RAY EXAM OF FOOT: CPT

## 2020-08-03 PROCEDURE — 3700000000 HC ANESTHESIA ATTENDED CARE: Performed by: PODIATRIST

## 2020-08-03 PROCEDURE — 7100000031 HC ASPR PHASE II RECOVERY - ADDTL 15 MIN: Performed by: PODIATRIST

## 2020-08-03 PROCEDURE — 6360000002 HC RX W HCPCS: Performed by: NURSE ANESTHETIST, CERTIFIED REGISTERED

## 2020-08-03 PROCEDURE — 6360000002 HC RX W HCPCS: Performed by: STUDENT IN AN ORGANIZED HEALTH CARE EDUCATION/TRAINING PROGRAM

## 2020-08-03 PROCEDURE — 28090 REMOVAL OF FOOT LESION: CPT | Performed by: PODIATRIST

## 2020-08-03 PROCEDURE — 2500000003 HC RX 250 WO HCPCS: Performed by: PODIATRIST

## 2020-08-03 PROCEDURE — 2580000003 HC RX 258: Performed by: ANESTHESIOLOGY

## 2020-08-03 PROCEDURE — 88304 TISSUE EXAM BY PATHOLOGIST: CPT

## 2020-08-03 PROCEDURE — 3600000002 HC SURGERY LEVEL 2 BASE: Performed by: PODIATRIST

## 2020-08-03 RX ORDER — LABETALOL 20 MG/4 ML (5 MG/ML) INTRAVENOUS SYRINGE
5 EVERY 10 MIN PRN
Status: DISCONTINUED | OUTPATIENT
Start: 2020-08-03 | End: 2020-08-03 | Stop reason: HOSPADM

## 2020-08-03 RX ORDER — BUPIVACAINE HYDROCHLORIDE 5 MG/ML
INJECTION, SOLUTION EPIDURAL; INTRACAUDAL PRN
Status: DISCONTINUED | OUTPATIENT
Start: 2020-08-03 | End: 2020-08-03 | Stop reason: ALTCHOICE

## 2020-08-03 RX ORDER — ONDANSETRON 2 MG/ML
4 INJECTION INTRAMUSCULAR; INTRAVENOUS
Status: COMPLETED | OUTPATIENT
Start: 2020-08-03 | End: 2020-08-03

## 2020-08-03 RX ORDER — GLYCOPYRROLATE 1 MG/5 ML
SYRINGE (ML) INTRAVENOUS PRN
Status: DISCONTINUED | OUTPATIENT
Start: 2020-08-03 | End: 2020-08-03 | Stop reason: SDUPTHER

## 2020-08-03 RX ORDER — HYDROCODONE BITARTRATE AND ACETAMINOPHEN 5; 325 MG/1; MG/1
1 TABLET ORAL PRN
Status: DISCONTINUED | OUTPATIENT
Start: 2020-08-03 | End: 2020-08-03 | Stop reason: HOSPADM

## 2020-08-03 RX ORDER — MIDAZOLAM HYDROCHLORIDE 1 MG/ML
INJECTION INTRAMUSCULAR; INTRAVENOUS PRN
Status: DISCONTINUED | OUTPATIENT
Start: 2020-08-03 | End: 2020-08-03 | Stop reason: SDUPTHER

## 2020-08-03 RX ORDER — PROPOFOL 10 MG/ML
INJECTION, EMULSION INTRAVENOUS PRN
Status: DISCONTINUED | OUTPATIENT
Start: 2020-08-03 | End: 2020-08-03 | Stop reason: SDUPTHER

## 2020-08-03 RX ORDER — FENTANYL CITRATE 50 UG/ML
INJECTION, SOLUTION INTRAMUSCULAR; INTRAVENOUS PRN
Status: DISCONTINUED | OUTPATIENT
Start: 2020-08-03 | End: 2020-08-03 | Stop reason: SDUPTHER

## 2020-08-03 RX ORDER — HYDROCODONE BITARTRATE AND ACETAMINOPHEN 5; 325 MG/1; MG/1
1 TABLET ORAL EVERY 6 HOURS PRN
Qty: 28 TABLET | Refills: 0 | Status: SHIPPED | OUTPATIENT
Start: 2020-08-03 | End: 2020-08-10

## 2020-08-03 RX ORDER — PROPOFOL 10 MG/ML
INJECTION, EMULSION INTRAVENOUS CONTINUOUS PRN
Status: DISCONTINUED | OUTPATIENT
Start: 2020-08-03 | End: 2020-08-03 | Stop reason: SDUPTHER

## 2020-08-03 RX ORDER — HYDROCODONE BITARTRATE AND ACETAMINOPHEN 5; 325 MG/1; MG/1
2 TABLET ORAL PRN
Status: DISCONTINUED | OUTPATIENT
Start: 2020-08-03 | End: 2020-08-03 | Stop reason: HOSPADM

## 2020-08-03 RX ORDER — DIPHENHYDRAMINE HYDROCHLORIDE 50 MG/ML
12.5 INJECTION INTRAMUSCULAR; INTRAVENOUS
Status: DISCONTINUED | OUTPATIENT
Start: 2020-08-03 | End: 2020-08-03 | Stop reason: HOSPADM

## 2020-08-03 RX ORDER — LIDOCAINE HYDROCHLORIDE 10 MG/ML
INJECTION, SOLUTION INFILTRATION; PERINEURAL PRN
Status: DISCONTINUED | OUTPATIENT
Start: 2020-08-03 | End: 2020-08-03 | Stop reason: ALTCHOICE

## 2020-08-03 RX ORDER — SODIUM CHLORIDE 9 MG/ML
INJECTION, SOLUTION INTRAVENOUS CONTINUOUS
Status: DISCONTINUED | OUTPATIENT
Start: 2020-08-03 | End: 2020-08-03 | Stop reason: HOSPADM

## 2020-08-03 RX ADMIN — HYDROMORPHONE HYDROCHLORIDE 0.25 MG: 1 INJECTION, SOLUTION INTRAMUSCULAR; INTRAVENOUS; SUBCUTANEOUS at 10:11

## 2020-08-03 RX ADMIN — PROPOFOL 50 MG: 10 INJECTION, EMULSION INTRAVENOUS at 07:29

## 2020-08-03 RX ADMIN — SODIUM CHLORIDE: 9 INJECTION, SOLUTION INTRAVENOUS at 06:47

## 2020-08-03 RX ADMIN — FENTANYL CITRATE 50 MCG: 50 INJECTION, SOLUTION INTRAMUSCULAR; INTRAVENOUS at 07:29

## 2020-08-03 RX ADMIN — PROPOFOL 50 MG: 10 INJECTION, EMULSION INTRAVENOUS at 07:31

## 2020-08-03 RX ADMIN — PROPOFOL 100 MCG/KG/MIN: 10 INJECTION, EMULSION INTRAVENOUS at 07:29

## 2020-08-03 RX ADMIN — CEFAZOLIN 2 G: 10 INJECTION, POWDER, FOR SOLUTION INTRAVENOUS at 07:36

## 2020-08-03 RX ADMIN — ONDANSETRON 4 MG: 2 INJECTION INTRAMUSCULAR; INTRAVENOUS at 09:54

## 2020-08-03 RX ADMIN — MIDAZOLAM 1 MG: 1 INJECTION INTRAMUSCULAR; INTRAVENOUS at 07:21

## 2020-08-03 RX ADMIN — PROPOFOL 30 MG: 10 INJECTION, EMULSION INTRAVENOUS at 07:54

## 2020-08-03 RX ADMIN — Medication 0.2 MG: at 07:59

## 2020-08-03 RX ADMIN — MIDAZOLAM 1 MG: 1 INJECTION INTRAMUSCULAR; INTRAVENOUS at 07:23

## 2020-08-03 RX ADMIN — FENTANYL CITRATE 25 MCG: 50 INJECTION, SOLUTION INTRAMUSCULAR; INTRAVENOUS at 07:54

## 2020-08-03 RX ADMIN — FENTANYL CITRATE 25 MCG: 50 INJECTION, SOLUTION INTRAMUSCULAR; INTRAVENOUS at 07:43

## 2020-08-03 ASSESSMENT — PULMONARY FUNCTION TESTS
PIF_VALUE: 1
PIF_VALUE: 0
PIF_VALUE: 1
PIF_VALUE: 1
PIF_VALUE: 0
PIF_VALUE: 1
PIF_VALUE: 0
PIF_VALUE: 1
PIF_VALUE: 0
PIF_VALUE: 1
PIF_VALUE: 0
PIF_VALUE: 0
PIF_VALUE: 1
PIF_VALUE: 0
PIF_VALUE: 1
PIF_VALUE: 0
PIF_VALUE: 0
PIF_VALUE: 1
PIF_VALUE: 0
PIF_VALUE: 1
PIF_VALUE: 0
PIF_VALUE: 1
PIF_VALUE: 1
PIF_VALUE: 0
PIF_VALUE: 1
PIF_VALUE: 0
PIF_VALUE: 1
PIF_VALUE: 1
PIF_VALUE: 0
PIF_VALUE: 1
PIF_VALUE: 1
PIF_VALUE: 0
PIF_VALUE: 1
PIF_VALUE: 1
PIF_VALUE: 0
PIF_VALUE: 1
PIF_VALUE: 1
PIF_VALUE: 0
PIF_VALUE: 0
PIF_VALUE: 1
PIF_VALUE: 0
PIF_VALUE: 1
PIF_VALUE: 0
PIF_VALUE: 0
PIF_VALUE: 1
PIF_VALUE: 0
PIF_VALUE: 1
PIF_VALUE: 1
PIF_VALUE: 0

## 2020-08-03 ASSESSMENT — PAIN SCALES - GENERAL
PAINLEVEL_OUTOF10: 9
PAINLEVEL_OUTOF10: 9
PAINLEVEL_OUTOF10: 5
PAINLEVEL_OUTOF10: 0

## 2020-08-03 ASSESSMENT — PAIN - FUNCTIONAL ASSESSMENT: PAIN_FUNCTIONAL_ASSESSMENT: 0-10

## 2020-08-03 ASSESSMENT — PAIN DESCRIPTION - DESCRIPTORS: DESCRIPTORS: ACHING

## 2020-08-03 NOTE — ANESTHESIA POSTPROCEDURE EVALUATION
Department of Anesthesiology  Postprocedure Note    Patient: Antonina Jalloh  MRN: [id number]  YOB: 1960  Date of evaluation: 8/3/2020  Time:  10:34 AM     Procedure Summary     Date:  08/03/20 Room / Location:  [address]  / [address]    Anesthesia Start:  2241 Anesthesia Stop:  6073    Procedure:  EXCISION GANGLION FOOT/EXOSTECTOMY DORSAL FOOT (Left ) Diagnosis:       (GANGLION LEFT FOOT)      (DORSAL SPUR LEFT FOOT)      (PAT PER ANES)    Surgeon:  Rodríguez Bullock DPM Responsible Provider:  Radha Del Castillo MD    Anesthesia Type:  MAC, general ASA Status:  3          Anesthesia Type: MAC, general    Reinaldo Phase I: Reinaldo Score: 9    Reinaldo Phase II:      Last vitals: Reviewed and per EMR flowsheets.        Anesthesia Post Evaluation    Comments: POST- ANESTHESIA EVALUATION       Pt Name: Antonina Jalloh  MRN: [id number]  YOB: 1960  Date of evaluation: 8/3/2020  Time:  10:35 AM      BP (!) 170/97   Pulse 107   Temp 98.1 °F (36.7 °C) (Temporal)   Resp 18   Ht 5' 3\" (1.6 m)   Wt 186 lb (84.4 kg)   SpO2 97%   BMI 32.95 kg/m²      Consciousness Level  Awake  Cardiopulmonary Status  Stable  Pain Adequately Treated YES  Nausea / Vomiting  NO  Adequate Hydration  YES  Anesthesia Related Complications NONE      Electronically signed by Radha Del Castillo MD on 8/3/2020 at 10:35 AM

## 2020-08-03 NOTE — ANESTHESIA PRE PROCEDURE
Department of Anesthesiology  Preprocedure Note       Name:  Geraldine Notice   Age:  63 y.o.  :  1960                                          MRN:  [id number]         Date:  8/3/2020      Surgeon: Cuco Owens):  Jalil Adamson DPM    Procedure: EXCISION GANGLION FOOT/EXOSTECTOMY DORSAL FOOT (N/A )    Medications prior to admission:   Prior to Admission medications    Medication Sig Start Date End Date Taking? Authorizing Provider   diclofenac sodium (VOLTAREN) 1 % GEL Apply topically 4 times daily as needed for Pain 20   Jalil Adamson DPM   clotrimazole-betamethasone (LOTRISONE) 1-0.05 % cream Apply topically 2 times daily.  20   Jalil Adamson DPM   gabapentin (NEURONTIN) 600 MG tablet Take 1 po bid 20  Karthikeyan Arechiga MD   [id number]-[id number] [facility]) 9-77.099-9-32 MG CAPS capsule Take 1 capsule by mouth 2 times daily 20   Jalil Adamson DPM   Dulaglutide (TRULICITY) 9.94 WD/5.5IN SOPN inject 0.5 milliliter INTO THE SKIN every week  Patient taking differently: Indications: on  inject 0.5 milliliter INTO THE SKIN every week 20   Aimee Manzanares MD   losartan (COZAAR) 50 MG tablet take 1 tablet by mouth once daily 20   Aimee Manzanares MD   metFORMIN (GLUCOPHAGE) 1000 MG tablet take 1 tablet by mouth once daily (PATIENT TAKES AT NIGHT) 20   Aimee Manzanares MD   atorvastatin (LIPITOR) 10 MG tablet Take 1 tablet by mouth daily 19   Aimee Manzanares MD   blood glucose test strips (ONE TOUCH ULTRA TEST) strip Use once daily to check BS 19   Aimee Manzanares MD   Lancets 30G MISC 1 each by Does not apply route daily Use to check BS BID Dx E 11.9 19   Aimee Manzanares MD   vitamin D (ERGOCALCIFEROL) 1.25 MG (13192 UT) CAPS capsule take 1 capsule by mouth every week  Patient taking differently: Indications: on  take 1 capsule by mouth every week 19   Aimee Manzanares MD   traZODone (DESYREL) 50 MG tablet take 1 tablet by mouth at bedtime 9/25/19   Historical Provider, MD   ARIPiprazole (ABILIFY) 5 MG tablet take 1 tablet by mouth once daily 9/25/19   Historical Provider, MD   Blood Glucose Monitoring Suppl (ONE TOUCH ULTRA 2) w/Device KIT use as directed DUE TO PATIENTS INSURANCE, WE NEED TO GET A NEW METER FOR ONE TOUCH, DUE TO INS. [employer] 9/26/18   Mariela Andrade MD   aspirin 81 MG tablet Take 81 mg by mouth daily. Historical Provider, MD       Current medications:    No current facility-administered medications for this visit. No current outpatient medications on file. Facility-Administered Medications Ordered in Other Visits   Medication Dose Route Frequency Provider Last Rate Last Dose    0.9 % sodium chloride infusion   Intravenous Continuous Erin William  mL/hr at 08/03/20 0647         Allergies:     Allergies   Allergen Reactions    Lisinopril Shortness Of Breath     SOB and cough    Codeine Hives       Problem List:    Patient Active Problem List   Diagnosis Code    DM [facility]diabetes mellitus[facility] 2 [facility] E11.9    HTN (hypertension) I10    Glaucoma H40.9    Depression F32.9    Bone spur of left foot M77.52    Pain, foot, left, chronic M79.672, G89.29    Onychomycosis B35.1    Neuropathy G62.9    History of TIA (transient ischemic attack) Z86.73    Chest pain R07.9    Abnormal echocardiogram R93.1    Positive cardiac stress test R94.39    Hyperlipidemia E78.5    Primary insomnia F51.01    Type 2 diabetes mellitus with complication, without long-term current use of insulin ([facility]) E11.8    Pain of great toe, left M79.675    Ganglion cyst of left foot M67.472    Vitamin D deficiency E55.9    Type 2 diabetes mellitus, without long-term current use of insulin (HCC) E11.9    Low back pain M54.5    Severe major depression with psychotic features ([facility]) F32.3       Past Medical History:        Diagnosis Date    Arthritis     Bulging disc 07/13    c4 c5    Cancer (HCC)     uterus cells found    Cerebral artery occlusion with cerebral infarction ([facility] [address]     tia    Depression 10/3/2014    Diabetic neuropathy ([facility] [address]     all L.L.    Glaucoma     LT EYE    History of blood transfusion      AFTER CHILDBIRTH    Hyperlipidemia     Hypertension     Irregular heart beat     hx of    TIA (transient ischemic attack) 13    Type II or unspecified type diabetes mellitus without mention of complication, not stated as uncontrolled     Wears glasses     for reading       Past Surgical History:        Procedure Laterality Date    APPENDECTOMY  2005    BLADDER SURGERY  2005    Bladder tear. , PT NOT AWARE OF THIS    BREAST SURGERY Right     BIOPSY    CARDIAC SURGERY      CARDIAC CATH  WAS NEG     SECTION, LOW TRANSVERSE      x3    CHOLECYSTECTOMY      COLONOSCOPY      negative    COLONOSCOPY  2019    COLONOSCOPY N/A 2019    COLORECTAL CANCER SCREENING, NOT HIGH RISK performed by Claudine Garcia MD at [facility] 2019    AVULSION LEFT HALLUX TOENAIL AND INJECTION OF LEFT FOOT CYST performed by Jatinder Bales DPM at [address] [place] [address] N      as a child    HYSTERECTOMY  2005    Supracervical abd hyst. Cervix remains    OTHER SURGICAL HISTORY      cyst removed back of head    SMALL INTESTINE SURGERY      removed adhesions    SPLENECTOMY      part of pancreas removed    TOENAIL AVULSION Left 10/24/2016    2ND TOE    WISDOM TOOTH EXTRACTION         Social History:    Social History     Tobacco Use    Smoking status: Never Smoker    Smokeless tobacco: Never Used   Substance Use Topics    Alcohol use: Yes     Comment: rare                                Counseling given: Not Answered      Vital Signs (Current): There were no vitals filed for this visit.                                            BP Readings from Last 3 Encounters:   20 (!) 160/74   20 (!) 117/55   20 (!) 148/80       NPO Status:

## 2020-08-03 NOTE — OP NOTE
PATIENT NAME: Cruz Jarrell  YOB: 1960  -  63 y.o. female  MRN: [id number]  DATE: 8/3/2020  BILLING #: [id number]     Surgeon(s):  Rabia Felipe DPM      ASSISTANTS: Viviana Forrester DPM PGY2, Venessa Sinha PGY1     PRE-OP DIAGNOSIS:   1. Bone spur, left foot  2. Ganglion cyst, left foot     POST-OP DIAGNOSIS: Same as above.     PROCEDURE:   1. Exostectomy, left foot  2. Removal of ganglion cyst, left foot     ANESTHESIA: MAC with local (10 cc of 1:1 mixture 1% lidocaine plain and 0.5% marcaine plain)     HEMOSTASIS: Pneumatic ankle left tourniquet @ 250 mmHg for 42 minutes.     ESTIMATED BLOOD LOSS: Less than 5 cc.     MATERIALS: 2-0 vicryl, 4-0 vicryl, 4-0 monocryl  * No implants in log *     INJECTABLES: 10 cc of 0.5% marcaine plain     SPECIMEN: ganglion from dorsal left foot  ID Type Source Tests Collected by Time Destination   A : SUBTISSUE MASS LEFT FOOT Tissue Foot SURGICAL PATHOLOGY Rabia Felipe DPM 8/3/2020 8407           COMPLICATIONS: none     FINDINGS: Prominent bone smoothed down, resection of ganglion cyst. Portion of muscle belly removed. No necrosis of underlying soft tissues noted     The patient was counseled at length about the risks of chris Covid-19 during their perioperative period and any recovery window from their procedure.  The patient was made aware that chris Covid-19  may worsen their prognosis for recovering from their procedure  and lend to a higher morbidity and/or mortality risk.  All material risks, benefits, and reasonable alternatives including postponing the procedure were discussed. The patient does wish to proceed with the procedure at this time. INDICATIONS FOR PROCEDURE: Cruz Jarrell is a 63 y.o. female well known to Dr. Lillian Peguero with a CC of pain on the top of her left foot. Patient has failed conservative treatment and surgical treatment has been recommended to which the patient is amenable.   In preop all risks and benefits of the procedure were discussed at length prior to the patient signing the consent form. Consent is signed, witnessed and placed in patient chart. The left foot was marked, antibiotics hung (2 g Ancef), labs and images reviewed, n.p.o. status confirmed. No guarantees were given or implied. PROCEDURE IN DETAIL:   The patient was brought to the operating room and placed on the operating table in the supine position with a safety strap across lap. A well-padded pneumatic ankle tourniquet was applied to the left ankle. After adequate sedation was given by the anesthesia team, a local block of 10 cc of 1: 1 mixture of 1% lidocaine plain and 0.5% Marcaine plain was injected to the left foot preoperatively. The surgical site was then scrubbed, prepped, and draped in the usual aseptic manner. A timeout was performed confirming the patient's identity, correct site, correct procedure, allergies, and any preoperative antibiotics. Esmarch bandage was then used to exsanguinate the left foot and the tourniquet was inflated to 250 mmHg. Attention was directed to the dorsal midfoot. A palpable firm mass overlying the dorsal midfoot was appreciated. A dorsal longitudinal incision was created over the dorsal midfoot using a #15 blade. Bleeders were ligated. The incision was then deepened via sharp and blunt dissection. The skin and subcutaneous tissue were then undermined off the periosteum. Dorsal linear capsular incision was then created. Periosteum and capsule were then reflected off the bone. There was noted to be a prominent sharp bone dorsally at the level of the midfoot. At this time it was noted that there was a prominent muscle belly laterally in the surgical site. A power rasp was then used to smooth out all prominent bone dorsally and resect any rough edges. At this time it was noted that there was cystlike formation medially to the prominent bone.   Combination of sharp and blunt dissection was carried out until the cyst was freed from the surrounding soft tissue. The cysts was not overlying a particular joint or tendon. It was observed that the ganglion cysts were noted to be lobulated in nature. Using iris and tenotomy scissors 3 isolated cysts were excised. Care was taken to avoid any neurovascular bundles or soft tissue structures. Once the encapsulated cyst were completely removed they were passed from the table to be sent to pathology. After excision, the surgical site was clear without any tracking proximally or distally. No deeper attachments or necrosis was noted. Surgical site was then irrigated with copious amounts of normal sterile saline. The skin was then closed in layers, deep closure with 2-0 Vicryl, subcutaneous tissue with 4-0 Vicryl, subcuticular closure with 4-0 Monocryl. Upon completion of the procedure, a total of 10 cc of 0.5% Marcaine plain was injected around the surgical sites. The incision was dressed with Steri-Strips, Adaptic, 4 x 4's, ABD, Kerlix, and Ace. The tourniquet was then deflated and immediate hyperemia return to all digits. Patient tolerated the procedure and anesthesia well and was transferred to the PACU with all vital signs stable and vascular status intact to the left foot and ankle. Following a period of postoperative monitoring, the patient will be discharged home and given instructions and prescriptions which were discussed prior to the surgery. Patient will be weightbearing as tolerated in surgical shoe. Instructed to keep dressings clean, dry, and intact until follow-up with Dr. Matthieu Butler within 1 week.       Electronically signed by Karen Parada DPM on 8/3/2020 at 5:12 PM

## 2020-08-03 NOTE — INTERVAL H&P NOTE
Update History & Physical    The patient's History and Physical of July 20, 2020 was reviewed with the patient and I examined the patient. There was no change. Here today for excision ganglion foot/exotectomy dorsal left foot. NPO. No medications taken this am. No blood thinners in last 7 days. Denies current chest pain/pressure, palpitations, SOB, recent URI, nausea, vomiting, diarrhea, constipation, fever or chills. COVID testing negative on 07/30/2020. Vitals reviewed. Pt hypertensive, afebrile.        Electronically signed by FAY Russell CNP on 8/3/2020 at 6:12 AM

## 2020-08-04 LAB — SURGICAL PATHOLOGY REPORT: NORMAL

## 2020-08-06 ENCOUNTER — OFFICE VISIT (OUTPATIENT)
Dept: PODIATRY | Age: 60
End: 2020-08-06

## 2020-08-06 VITALS — HEIGHT: 63 IN | WEIGHT: 186 LBS | BODY MASS INDEX: 32.96 KG/M2

## 2020-08-06 PROCEDURE — 99024 POSTOP FOLLOW-UP VISIT: CPT | Performed by: PODIATRIST

## 2020-08-06 NOTE — PROGRESS NOTES
[address] [facility] [address]  Post Op note  Chief Complaint   Patient presents with    Post-Op Check     initial left foot        Preston Cardoza is a 63y.o. year old female who is 3 day(s) post op from foot surgery. Type: excision of ganglion left foot, removal of spur left foot. Vital signs are stable. Pain level is 1. Patient denies N/V/F/C. Patient has been compliant with postoperative instructions. Review of Systems    Vascular: DP and PT pulses palpable 2/4, Right Foot and 2/4 on the Left Foot. CFT <3 seconds, Right Foot and <3 seconds on the Left Foot. Edema is absent,  Right Foot and presenton the Left Foot. Neurological:   Sensation present  to light touch to level of digits, both feet. Musculoskeletal:   Muscle strength is 5/5 on the Right Foot and 5/5 on the Left Foot. Structural deformities are absent on the Right Foot and absent on the Left Foot. Integument:  Warm, dry, supple both feet. Incisions are healing well. Wound dehiscence is absent. Infection is absent. Assesment : Post operative progress gradually improving. Diagnosis Orders   1. Ganglion cyst of left foot     2. Bone spur of left foot     3. Postoperative state           Plan: Sutures in place. Dry sterile dressing applied. Compression with Coban,Keep surgical site dry. Ice and elevation as directed. Darco shoe  No orders of the defined types were placed in this encounter. Follow up 2week(s).

## 2020-08-17 ENCOUNTER — OFFICE VISIT (OUTPATIENT)
Dept: PODIATRY | Age: 60
End: 2020-08-17

## 2020-08-17 VITALS — WEIGHT: 186 LBS | HEIGHT: 63 IN | BODY MASS INDEX: 32.96 KG/M2

## 2020-08-17 PROCEDURE — 99024 POSTOP FOLLOW-UP VISIT: CPT | Performed by: PODIATRIST

## 2020-08-17 RX ORDER — ASPIRIN 81 MG/1
TABLET, DELAYED RELEASE ORAL
COMMUNITY
Start: 2020-08-03 | End: 2020-08-17 | Stop reason: SDUPTHER

## 2020-08-19 ENCOUNTER — HOSPITAL ENCOUNTER (OUTPATIENT)
Dept: GENERAL RADIOLOGY | Age: 60
Discharge: HOME | End: 2020-08-21
Payer: MEDICAID

## 2020-08-19 ENCOUNTER — HOSPITAL ENCOUNTER (OUTPATIENT)
Age: 60
Discharge: HOME | End: 2020-08-21
Payer: MEDICAID

## 2020-08-19 PROCEDURE — 72100 X-RAY EXAM L-S SPINE 2/3 VWS: CPT

## 2020-08-20 ENCOUNTER — OFFICE VISIT (OUTPATIENT)
Dept: NEUROLOGY | Age: 60
End: 2020-08-20
Payer: MEDICAID

## 2020-08-20 VITALS
SYSTOLIC BLOOD PRESSURE: 133 MMHG | TEMPERATURE: 97.3 F | DIASTOLIC BLOOD PRESSURE: 78 MMHG | HEART RATE: 80 BPM | HEIGHT: 63 IN | BODY MASS INDEX: 33.31 KG/M2 | WEIGHT: 188 LBS

## 2020-08-20 PROCEDURE — G8417 CALC BMI ABV UP PARAM F/U: HCPCS | Performed by: PSYCHIATRY & NEUROLOGY

## 2020-08-20 PROCEDURE — 99214 OFFICE O/P EST MOD 30 MIN: CPT | Performed by: PSYCHIATRY & NEUROLOGY

## 2020-08-20 PROCEDURE — 3017F COLORECTAL CA SCREEN DOC REV: CPT | Performed by: PSYCHIATRY & NEUROLOGY

## 2020-08-20 PROCEDURE — 1036F TOBACCO NON-USER: CPT | Performed by: PSYCHIATRY & NEUROLOGY

## 2020-08-20 PROCEDURE — G8427 DOCREV CUR MEDS BY ELIG CLIN: HCPCS | Performed by: PSYCHIATRY & NEUROLOGY

## 2020-08-20 RX ORDER — AMITRIPTYLINE HYDROCHLORIDE 25 MG/1
TABLET, FILM COATED ORAL
Qty: 60 TABLET | Refills: 3 | Status: SHIPPED | OUTPATIENT
Start: 2020-08-20 | End: 2020-11-04 | Stop reason: SDUPTHER

## 2020-08-20 RX ORDER — GABAPENTIN 600 MG/1
TABLET ORAL
Qty: 60 TABLET | Refills: 2 | Status: SHIPPED | OUTPATIENT
Start: 2020-08-20 | End: 2020-11-20

## 2020-08-20 ASSESSMENT — ENCOUNTER SYMPTOMS
COUGH: 1
SHORTNESS OF BREATH: 0
NAUSEA: 0
BACK PAIN: 0
EYES NEGATIVE: 1
BACK PAIN: 1
COLOR CHANGE: 0
DIARRHEA: 0
ALLERGIC/IMMUNOLOGIC NEGATIVE: 1

## 2020-08-20 NOTE — PROGRESS NOTES
Assessment:    Diagnosis Orders   1. Ganglion cyst of left foot     2. Bone spur of left foot     3. Postoperative state       Plan: The patient was advised that they may begin bathing their left lower extremity tomorrow. However, they are cautioned against any aggressive scrubbing of the incision site as this may reopen the wound. Patient may transition to regular shoe wear and activity at this time. Return in about 4 weeks (around 9/14/2020).      Viri Brito DPM

## 2020-08-20 NOTE — PROGRESS NOTES
Subjective:      Patient ID: Mylene White is a 63 y.o. female. HPI  Active problem low back pain with right lower extremity radicular pain with numbness of outer right foot and last three toes along with also right meralgia paresthetica to undergo lumbar xray , course of PT and increasing neurontin to 600 mg po bid . The condition is she reports that back pain on neurontin has remained at grade 7 over 10 with leg numbness with pain increasing with activity or prolonged sitting going down outer right thigh and calf going to foot to outer right three toes . She has not undergone PT. She has had numbness of right leg for the past one year . She has diabetes for 3 years being on metformin and glimepiride and trulicity . Numbness began in the right lateral thigh that was associated with burning which is there all the time quite sensitive being unable to lay on that side . There is also numbness in outer part of top of right foot and right outer three toes . At night she will have restless legs only partially attenuated with neuronin . There are no bowel or bladder problems . Significant medications neurontin 600 mg po bid . Testing Hga1c 6.8 , October 2019 .  Lumbar xray multi level denerative disc disease and facet arthropathy      Past Medical History:   Diagnosis Date    Arthritis     Bulging disc 07/13    c4 c5    Cancer [facility])     uterus cells found    Cerebral artery occlusion with cerebral infarction ([facility] [address]     tia    Depression 10/3/2014    Diabetic neuropathy (HCC)     all L.L.    Glaucoma     LT EYE    History of blood transfusion     1976 AFTER CHILDBIRTH    Hyperlipidemia     Hypertension     Irregular heart beat     hx of    TIA (transient ischemic attack) 07/22/13    Type II or unspecified type diabetes mellitus without mention of complication, not stated as uncontrolled     Wears glasses     for reading       Past Surgical History:   Procedure Laterality Date    APPENDECTOMY  09/02/2005  BLADDER SURGERY  2005    Bladder tear. , PT NOT AWARE OF THIS    BREAST SURGERY Right     BIOPSY    CARDIAC SURGERY      CARDIAC CATH  WAS NEG     SECTION, LOW TRANSVERSE      x3    CHOLECYSTECTOMY      COLONOSCOPY      negative    COLONOSCOPY  2019    COLONOSCOPY N/A 2019    COLORECTAL CANCER SCREENING, NOT HIGH RISK performed by Fern Melendez MD at [address] Left 2020    FINGER NAIL SURGERY Left 2019    AVULSION LEFT HALLUX TOENAIL AND INJECTION OF LEFT FOOT CYST performed by Mich Kumar DPM at [address] Left 8/3/2020    EXCISION GANGLION FOOT/EXOSTECTOMY DORSAL FOOT performed by Mich Kumar DPM at [address] [facility]      as a child    HYSTERECTOMY  2005    Supracervical abd hyst. Cervix remains    OTHER SURGICAL HISTORY      cyst removed back of head    SMALL INTESTINE SURGERY      removed adhesions    SPLENECTOMY      part of pancreas removed    TOENAIL AVULSION Left 10/24/2016    2ND TOE    WISDOM TOOTH EXTRACTION         Family History   Problem Relation Age of Onset    Cancer Father         lung    COPD Mother     Heart Disease Mother         chf    Cancer Paternal Grandfather         prostate    Cancer Paternal Grandmother         uterine    Diabetes Maternal Grandfather     Heart Disease Maternal Grandfather     Hypertension Maternal Grandfather     Cancer Sister         stomach    Hypertension Maternal Aunt     Cancer Maternal Aunt         bone    Cancer Sister         breast with bilateral mastectomy    Cancer Maternal Cousin         stomach or colon?        Social History     Socioeconomic History    Marital status: Single     Spouse name: None    Number of children: None    Years of education: None    Highest education level: None   Occupational History    None   Social Needs    Financial resource strain: None    Food insecurity     Worry: None     Inability: None    Transportation needs     Medical: None     Non-medical: None   Tobacco Use    Smoking status: Never Smoker    Smokeless tobacco: Never Used   Substance and Sexual Activity    Alcohol use: Yes     Comment: rare    Drug use: No    Sexual activity: Never   Lifestyle    Physical activity     Days per week: None     Minutes per session: None    Stress: None   Relationships    Social connections     Talks on phone: None     Gets together: None     Attends Mandaen service: None     Active member of club or organization: None     Attends meetings of clubs or organizations: None     Relationship status: None    Intimate partner violence     Fear of current or ex partner: None     Emotionally abused: None     Physically abused: None     Forced sexual activity: None   Other Topics Concern    None   Social History Narrative    None       Current Outpatient Medications   Medication Sig Dispense Refill    gabapentin (NEURONTIN) 600 MG tablet Take 1 po bid 60 tablet 2    amitriptyline (ELAVIL) 25 MG tablet Take 1 po qhs x 2 days then 2 po qhs 60 tablet 3    diclofenac sodium (VOLTAREN) 1 % GEL Apply topically 4 times daily as needed for Pain 1 Tube 0    clotrimazole-betamethasone (LOTRISONE) 1-0.05 % cream Apply topically 2 times daily.  45 g 2    Dulaglutide (TRULICITY) 6.47 BT/9.1JX SOPN inject 0.5 milliliter INTO THE SKIN every week (Patient taking differently: Indications: on Sunday inject 0.5 milliliter INTO THE SKIN every week) 2 mL 1    losartan (COZAAR) 50 MG tablet take 1 tablet by mouth once daily 90 tablet 0    metFORMIN (GLUCOPHAGE) 1000 MG tablet take 1 tablet by mouth once daily (PATIENT TAKES AT NIGHT) 90 tablet 0    atorvastatin (LIPITOR) 10 MG tablet Take 1 tablet by mouth daily 90 tablet 0    blood glucose test strips (ONE TOUCH ULTRA TEST) strip Use once daily to check  strip 0    Lancets 30G MISC 1 each by Does not apply route daily Use to check BS BID Dx E 11.9 100 each 0  vitamin D (ERGOCALCIFEROL) 1.25 MG (61087 UT) CAPS capsule take 1 capsule by mouth every week (Patient taking differently: Indications: on Sunday take 1 capsule by mouth every week) 6 capsule 0    traZODone (DESYREL) 50 MG tablet take 1 tablet by mouth at bedtime  0    ARIPiprazole (ABILIFY) 5 MG tablet take 1 tablet by mouth once daily  0    Blood Glucose Monitoring Suppl (ONE TOUCH ULTRA 2) w/Device KIT use as directed DUE TO PATIENTS INSURANCE, WE NEED TO GET A NEW METER FOR ONE TOUCH, DUE TO INS. [employer] 1 kit 0    aspirin 81 MG tablet Take 81 mg by mouth daily.  L-methylfolate-B6-B12 (METANX) 9-25.155-6-58 MG CAPS capsule Take 1 capsule by mouth 2 times daily (Patient not taking: Reported on 8/20/2020) 60 capsule 5     No current facility-administered medications for this visit. Allergies   Allergen Reactions    Lisinopril Shortness Of Breath     SOB and cough    Codeine Hives       Review of Systems   Constitutional: Positive for fatigue. Eyes: Negative. Respiratory: Positive for cough. Cardiovascular: Positive for leg swelling. Endocrine: Negative. Genitourinary: Negative. Musculoskeletal: Positive for arthralgias and back pain. Skin: Negative. Allergic/Immunologic: Negative. Neurological: Positive for numbness. Hematological: Negative. Psychiatric/Behavioral: Positive for dysphoric mood. The patient is nervous/anxious. Objective:   Physical Exam  Vitals:    08/20/20 1026   BP: 133/78   Pulse: 80   Temp: 97.3 °F (36.3 °C)     weight: 188 lb (85.3 kg)    Neurological Examination  Constitutional .General exam well groomed   Head/Ears /Nose/Throat: external ear . Normal exam  Neck and thyroid . Normal size. No bruits  Respiratory . Breathsounds clear bilaterally  Cardiovascular:  Auscultation of heart with regular rate and rhythm  Musculoskeletal. Muscle bulk and tone normal                                                           Muscle strength 5/5 strength throughout                                                                                No dysmetria or dysdiadokinesis  No tremor   Normal fine motor  Gait normal   Orientation Alert and oriented x 3   Attention and concentration normal  Short term memory normal  Language process and speech normal . No aphasia   Cranial nerve 2 normal acuety and visual fields  Cranial nerve 3, 4 and 6 . Extraocular muscles are intact . Pupils are equal and reactive   Cranial nerve 5 . Intact corneal reflex. Normal facial sensation  Cranial nerve 7 normal exam   Cranial nerve 8. Grossly intact hearing   Cranial nerve 9 and 10. Symmetric palate elevation   Cranial nerve 11 , 5 out of 5 strength   Cranial Nerve 12 midline tongue . No atrophy  Sensation . Normal proprioception . Mild reduction vibration at toe level . decreased pinprick and light touch outer right lateral thigh and lateral right dorsal foot and last three toes   Deep Tendon Reflexes normal  Plantar response flexor bilaterally    Assessment:       Diagnosis Orders   1. Low back pain with sciatica, sciatica laterality unspecified, unspecified back pain laterality, unspecified chronicity  PT eval and treat   2. Meralgia paresthetica of right side     She is to remain on current medication regimen to undergo course of PT       Plan:      Orders Placed This Encounter   Procedures    PT eval and treat     Standing Status:   Future     Standing Expiration Date:   8/20/2021     Scheduling Instructions:      Dx low back pain .  Assess and treat 3 weeks 3 x per week           Han Oneil MD

## 2020-08-31 ENCOUNTER — OFFICE VISIT (OUTPATIENT)
Dept: PODIATRY | Age: 60
End: 2020-08-31
Payer: MEDICAID

## 2020-08-31 VITALS — HEIGHT: 63 IN | WEIGHT: 186 LBS | BODY MASS INDEX: 32.96 KG/M2

## 2020-08-31 PROCEDURE — 3017F COLORECTAL CA SCREEN DOC REV: CPT | Performed by: PODIATRIST

## 2020-08-31 PROCEDURE — 99213 OFFICE O/P EST LOW 20 MIN: CPT | Performed by: PODIATRIST

## 2020-08-31 PROCEDURE — 1036F TOBACCO NON-USER: CPT | Performed by: PODIATRIST

## 2020-08-31 PROCEDURE — 99024 POSTOP FOLLOW-UP VISIT: CPT | Performed by: PODIATRIST

## 2020-08-31 PROCEDURE — G8417 CALC BMI ABV UP PARAM F/U: HCPCS | Performed by: PODIATRIST

## 2020-08-31 PROCEDURE — G8427 DOCREV CUR MEDS BY ELIG CLIN: HCPCS | Performed by: PODIATRIST

## 2020-08-31 RX ORDER — CLOTRIMAZOLE AND BETAMETHASONE DIPROPIONATE 10; .64 MG/G; MG/G
CREAM TOPICAL
Qty: 45 G | Refills: 2 | Status: SHIPPED | OUTPATIENT
Start: 2020-08-31

## 2020-08-31 RX ORDER — ESCITALOPRAM OXALATE 10 MG/1
TABLET ORAL
COMMUNITY
Start: 2020-08-24

## 2020-08-31 RX ORDER — TRAZODONE HYDROCHLORIDE 100 MG/1
TABLET ORAL
COMMUNITY
Start: 2020-08-24

## 2020-08-31 RX ORDER — ARIPIPRAZOLE 10 MG/1
TABLET ORAL
COMMUNITY
Start: 2020-08-24

## 2020-08-31 NOTE — PROGRESS NOTES
compression to help prevent recurrence of the cyst-for another month    Rx Lotrisone BID   Orders Placed This Encounter   Medications    clotrimazole-betamethasone (LOTRISONE) 1-0.05 % cream     Sig: Apply topically 2 times daily. Dispense:  45 g     Refill:  2       Follow up as needed.

## 2020-09-08 ENCOUNTER — HOSPITAL ENCOUNTER (OUTPATIENT)
Dept: PHYSICAL THERAPY | Age: 60
Setting detail: THERAPIES SERIES
Discharge: HOME | End: 2020-09-08
Payer: MEDICAID

## 2020-09-08 PROCEDURE — 97161 PT EVAL LOW COMPLEX 20 MIN: CPT

## 2020-09-08 NOTE — CONSULTS
[x] [facility]  Outpatient Physical Therapy  [address]  Phone: [phone]  Fax: [phone] [] [facility] at [address] [place] [address]  Phone: [phone]   Fax: [phone]     Physical Therapy Evaluation    Date:  2020  Patient: Gilma White  : 1960  MRN: [id number]  Physician: Rylie Howard MD  Insurance: Esaw Both need Nickola Furl after Eval   Medical Diagnosis: Low back pain with sciatica, sciatica laterality unspecified, unspecified back pain laterality, unspecified chronicity M54.40     Rehab Codes: M54.40  Onset Date:  20                               Next 's appt: 20    Subjective:   CC: Patient complains of daily back pain that radiates down her right LE into her foot. Patient notes feelings of numbness that is very irritated. Patient has difficulty ambulating and performing static sitting activities. Occasionally, her right leg feels weak like it may collapse. Unable to lay on right side due to pain. HPI: Worsening pain for 2 years. Prior PT and aquatic therapy in 2019 with little benefit. PMHx: [] Unremarkable [x] Diabetes [x] HTN  [] Pacemaker   [] MI/Heart Problems [] Cancer [x] Arthritis [] Asthma                         [x] refer to full medical chart  In [facility]  [] Other:        Comorbidities:   [x] Obesity [] Dialysis  [] Other:   [] Asthma/COPD [] Dementia [] Other:   [] Stroke [] Sleep apnea [] Other:   [] Vascular disease [] Rheumatic disease [] Other:     Tests: [x] X-Ray: [] MRI:  [] Other:     1. Multilevel degenerative disc disease and facet joint arthropathy mildly    progressed compared to 2019    2.  No acute lumbar spine abnormality        Medications: [x] Refer to full medical record [] None [] Other:  Allergies:      [x] Refer to full medical record  [] None [] Other:    Working:  [] Full-time  [] Part-time  [] Off d/t condition  [] Retired  [] Disability  [x] N/A  Job/Employer:    Pain:  [x] Yes  [] No Location: Right sided low back and LE  Pain Rating: (0-10 scale) 5/10  Pain altered Tx:  [] Yes  [x] No  Action:    Objective: p = pain, L = Lacks      ROM  ° A/P STRENGTH  ROM    Left Right Left Right Cervical    Shoulder Flex     Flexion    Abduction     Extension    ER     Rotation L R    IR     Side bending L R   Elbow Flex          Ext          Wrist Flex         Ext     Lumbar    Hand      Flexion Full, p   Hip Flexion    4/5 4-/5 Extension full   Ext   4/5 4-/5 Rotation L  75% R 75%   Abd     Sidebend L 75% R 75%   Add     -------------------- --------- --------   ER     STRENGTH     IR     Abdominals 4-/5    Knee Flex     Erector Spinae 4-/5    Ext      Scapular L R   Ankle DF     -Scaption     PF      -Retraction     Inversion     -Horz Abd     Eversion     -Extension        Special Tests:   Positive: R Slump test, R SLR        Negative:      OBSERVATION Comments   Posture Mild fwd head, leans left in sitting   Joint Alignment Anterior pelvic tilt   Gait Slow speed   Palpation Tender R lumbar paraspinals, QL    Edema No deficit    Neurological R LE radicular pain, sciatic distribution     Assessment: Patient presents with right sided low back pain with sciatica due to lumbar instability, core weakness, and underlying degeneration. Patient will benefit from PT to improve Core strength, Lumbopelvic posture, and functional activity tolerance. Problems:     [x] ? Pain: 5/10 low back and R LE  [x] ? ROM:  Trunk flexion  [x] ? Flexibility: hamstrings, lumbar extensors  [x] ? Strength: R glutes, abs  [x] ? Function: Oswestry score 74% impaired  [] Other:    STG: (to be met in 9 treatments)  1. ? Pain: 2/10 low back and R LE pain with ambulation. 2. ? Strength: 4/5 R hip extension and abduction to demonstrate improve core strength. 3. ? Function: Oswestry score of 54% impaired or better to improve ADLs.    4. Independent with Home Exercise Programs    Patient goals: Reduce pain and improve function. Rehab Potential:  [x] Good  [] Fair  [] Poor   Suggested Professional Referral:  [x] No  [] Yes:  Barriers to Goal Achievement[de-identified]  [x] No  [] Yes:  Domestic Concerns:  [x] No  [] Yes:    Pt. Education:  [x] Plans/Goals, Risks/Benefits discussed  [x] Home exercise program, Prone DLS program  Method of Education: [x] Verbal  [x] Demo  [x] Written  Comprehension of Education:  [x] Verbalizes understanding. [x] Demonstrates understanding. [x] Needs Review. [] Demonstrates/verbalizes understanding of HEP/Ed previously given. Treatment Plan:  [x] Therapeutic Exercise   74871  [x] Vasopneumatic cold with compression  36404    [x] Therapeutic Activity  61055 [x] Cold/hotpack    [] Gait Training   28661 [x] Lumbar/Cervical Traction  [id number]   [x] Neuromuscular Re-education  [id number] [] Electrical Stimulation Unattended  98450   [x] Manual Therapy    27547 [x] Electrical Stimulation Attended  [id number]   [] Iontophoresis: 4 mg/mL Dexamethasone Sodium Phosphate  mAmin  83493 []  Medication allergies reviewed for use of   Dexamethasone Sodium Phosphate 4mg/ml with iontophoresis treatments. Pt is not allergic. Frequency:  3 x/week for 9 visits    Todays Treatment:  Precautions:  Modalities:   Exercises:  Exercise Reps/ Time Weight/ Level Comments   Prone glute sets 10x     Prone hip ext 10x     Prone hs curl s   Handout given                      Other:    Specific Instructions for next treatment::Prone and standing core strengthening.        Evaluation Complexity:  History (Personal factors, comorbidities) [] 0 [x] 1-2 [] 3+   Exam (limitations, restrictions) [] 1-2 [x] 3 [] 4+   Clinical presentation (progression) [x] Stable [] Evolving  [] Unstable   Decision Making [x] Low [] Moderate [] High    [x] Low Complexity [] Moderate Complexity [] High Complexity       Treatment Charges: Mins Units   [x] Evaluation       [x]  Low       []  Moderate       []  High 30 1   []  Modalities     []  Ther Exercise     [] Manual Therapy     []  Ther Activities     []  Aquatics     []  Vasocompression     []  Other       TOTAL TREATMENT TIME: 0, Eval only    Time in:1500    Time out:1530    Electronically signed by: Amari Gonzales PT

## 2020-09-11 NOTE — PRE-CERTIFICATION NOTE
[x] [facility]) [facility]  Therapy  [address]  [phone]  F: [phone] [] [address] [facility]  [facility] [address]   [address]  P: [phone]  F: [phone] [] [address]  [address] [facility]  P: [phone]  F: [phone] [] [address]  [facility]   [address]   [place]: [phone]  F: [phone]  [x] [facility]   Outpatient Occupational Therapy  [address]: [phone]  F: [phone]          Therapy Pre-certification Note      9/11/2020    Grigsby Batters  1960   [id number]      Insurance approval was received for Physical Therapy from [facility] on 9/11/20. Approval was received from 9/10/20 to 10/1/20. Authorization number [id number]. Patient was contacted to be scheduled and message was left. 00497, 36 units  49499, 36 units  07656, 36 units  84507, 36 units  83134, 9 units  45052, 9 units  78131, 36 units    36 represents units of timed codes; 9 represents visits of untimed codes; all approved 3 x per week for 3 weeks.       Electronically signed by Demetri Robert PT on 9/11/2020 at 9:56 AM

## 2020-09-22 ENCOUNTER — HOSPITAL ENCOUNTER (OUTPATIENT)
Dept: PHYSICAL THERAPY | Age: 60
Setting detail: THERAPIES SERIES
Discharge: HOME | End: 2020-09-22
Payer: MEDICAID

## 2020-09-22 PROCEDURE — 97032 APPL MODALITY 1+ESTIM EA 15: CPT

## 2020-09-22 PROCEDURE — G0283 ELEC STIM OTHER THAN WOUND: HCPCS

## 2020-09-22 PROCEDURE — 97110 THERAPEUTIC EXERCISES: CPT

## 2020-09-22 NOTE — FLOWSHEET NOTE
1 out of 36  20 1   [x]  Ther Exercise        94892 2 out of 36 25 2   []  Manual Therapy     []  Ther Activities     []  Aquatics     []  Vasocompression     []  Other     Total Treatment time 45 3       Assessment: [] Progressing toward goals. [x] No change. Poor tolerance to standing ex with sharp pain noted in R ankle/tibia. Started with prone lying with an increase in radicular symptoms. Changed to supine in hook lying to find patients postural response. SKTC triggered spasm from R hip down to foot lasting around 3 minutes that required patient to self soothe with brisk ambulation. Ended with IFC/HP to R low back/glute to decrease pain and spasm with relief noted by patient. [] Other:  [x] Patient would continue to benefit from skilled physical therapy services in order to: Decrease pain, increase hip strength, ROM, increase core strength     Problems:     [x]? ? Pain: 5/10 low back and R LE  [x]? ? ROM:  Trunk flexion  [x]? ? Flexibility: hamstrings, lumbar extensors  [x]? ? Strength: R glutes, abs  [x]? ? Function: Oswestry score 74% impaired  []? Other:     STG: (to be met in 9 treatments)  1. ? Pain: 2/10 low back and R LE pain with ambulation. 2. ? Strength: 4/5 R hip extension and abduction to demonstrate improve core strength. 3. ? Function: Oswestry score of 54% impaired or better to improve ADLs. 4. Independent with Home Exercise Programs     Patient goals: Reduce pain and improve function.     Rehab Potential:  [x]? Good  []? Fair  []? Poor              Suggested Professional Referral:  [x]? No  []? Yes:  Barriers to Goal Achievement[de-identified]  [x]? No  []? Yes:  Domestic Concerns:  [x]? No  []? Yes:    Pt. Education:  [x] Yes  [] No  [] Reviewed Prior HEP/Ed  Method of Education: [x] Verbal  [] Demo  [] Written  Comprehension of Education:  [x] Verbalizes understanding. [] Demonstrates understanding. [x] Needs review. [] Demonstrates/verbalizes HEP/Ed previously given.      Plan: [x] Continue current frequency toward long and short term goals. [x] Specific Instructions for subsequent treatments: Prone and standing core strengthening.   Try IFC/HP prior to exercising      Time In: 1100            Time Out: 1150    Electronically signed by:  Paulie Willis PTA

## 2020-09-24 ENCOUNTER — HOSPITAL ENCOUNTER (OUTPATIENT)
Dept: PHYSICAL THERAPY | Age: 60
Setting detail: THERAPIES SERIES
Discharge: HOME | End: 2020-09-24
Payer: MEDICAID

## 2020-10-06 ENCOUNTER — OFFICE VISIT (OUTPATIENT)
Dept: PODIATRY | Age: 60
End: 2020-10-06
Payer: MEDICAID

## 2020-10-06 VITALS — HEIGHT: 63 IN | BODY MASS INDEX: 32.96 KG/M2 | WEIGHT: 186 LBS

## 2020-10-06 PROCEDURE — G8427 DOCREV CUR MEDS BY ELIG CLIN: HCPCS | Performed by: PODIATRIST

## 2020-10-06 PROCEDURE — 2022F DILAT RTA XM EVC RTNOPTHY: CPT | Performed by: PODIATRIST

## 2020-10-06 PROCEDURE — 3046F HEMOGLOBIN A1C LEVEL >9.0%: CPT | Performed by: PODIATRIST

## 2020-10-06 PROCEDURE — 1036F TOBACCO NON-USER: CPT | Performed by: PODIATRIST

## 2020-10-06 PROCEDURE — 11721 DEBRIDE NAIL 6 OR MORE: CPT | Performed by: PODIATRIST

## 2020-10-06 PROCEDURE — 3017F COLORECTAL CA SCREEN DOC REV: CPT | Performed by: PODIATRIST

## 2020-10-06 PROCEDURE — 99213 OFFICE O/P EST LOW 20 MIN: CPT | Performed by: PODIATRIST

## 2020-10-06 PROCEDURE — G8417 CALC BMI ABV UP PARAM F/U: HCPCS | Performed by: PODIATRIST

## 2020-10-06 PROCEDURE — G8484 FLU IMMUNIZE NO ADMIN: HCPCS | Performed by: PODIATRIST

## 2020-10-06 RX ORDER — ASPIRIN 81 MG/1
TABLET, DELAYED RELEASE ORAL
COMMUNITY
Start: 2020-10-04 | End: 2020-10-30 | Stop reason: SDUPTHER

## 2020-10-06 RX ORDER — DULAGLUTIDE 1.5 MG/.5ML
INJECTION, SOLUTION SUBCUTANEOUS
COMMUNITY
Start: 2020-09-22

## 2020-10-06 ASSESSMENT — ENCOUNTER SYMPTOMS
CONSTIPATION: 0
NAUSEA: 0
BACK PAIN: 1
COLOR CHANGE: 0
VOMITING: 0
DIARRHEA: 0

## 2020-10-06 NOTE — PROGRESS NOTES
[address] [facility]  Return Patient  Chief Complaint   Patient presents with    Post-Op Check     LEFT FOOT, DOING MUCH BETTER STILL GETS SOME SHARP PAIN AND NUMBNESS       Rodrigue Ni is a 63y.o. year old female who is 10 weeks post op from foot surgery. Type: excision of ganglion left foot, removal of spur left foot. Vital signs are stable. Pain level is 1. Patient denies N/V/F/C. Patient has been compliant with postoperative instructions. Still getting some numbness in her 1st and second toes left foot. Some shooting pains. Using the topicals, not wearing the compression stocking. Not wearing diabetic shoes and insoles. She also has some itching and burning between her toes. - this is gone now with cream    Would like her nails trimmed. She is a diabetic. Also having right ankle pain, some swelling, feeling like it is going to give out on her. Also right sided back issues. Review of Systems   Constitutional: Negative for chills, diaphoresis, fatigue, fever and unexpected weight change. Cardiovascular: Negative for leg swelling. Gastrointestinal: Negative for constipation, diarrhea, nausea and vomiting. Musculoskeletal: Positive for arthralgias, back pain, gait problem, myalgias and neck pain. Negative for joint swelling. Skin: Negative for color change, pallor, rash and wound. Neurological: Positive for numbness. Negative for weakness. Vascular: DP and PT pulses palpable 2/4, Right Foot and 2/4 on the Left Foot. CFT <3 seconds, Right Foot and <3 seconds on the Left Foot. Edema is absent,  Right Foot and abseet on the Left Foot. Neurological:   Sensation present  to light touch to level of digits, both feet. Musculoskeletal:   Muscle strength is 5/5 on the Right Foot and 5/5 on the Left Foot. Structural deformities are absent on the Right Foot and absent on the Left Foot.    Pain sinus tarsi right, local edema, pain with stress, inversion of the right

## 2020-11-04 ENCOUNTER — TELEMEDICINE (OUTPATIENT)
Dept: NEUROLOGY | Age: 60
End: 2020-11-04
Payer: MEDICAID

## 2020-11-04 PROCEDURE — G8427 DOCREV CUR MEDS BY ELIG CLIN: HCPCS | Performed by: PSYCHIATRY & NEUROLOGY

## 2020-11-04 PROCEDURE — G8484 FLU IMMUNIZE NO ADMIN: HCPCS | Performed by: PSYCHIATRY & NEUROLOGY

## 2020-11-04 PROCEDURE — 1036F TOBACCO NON-USER: CPT | Performed by: PSYCHIATRY & NEUROLOGY

## 2020-11-04 PROCEDURE — 99214 OFFICE O/P EST MOD 30 MIN: CPT | Performed by: PSYCHIATRY & NEUROLOGY

## 2020-11-04 PROCEDURE — G8417 CALC BMI ABV UP PARAM F/U: HCPCS | Performed by: PSYCHIATRY & NEUROLOGY

## 2020-11-04 PROCEDURE — 3017F COLORECTAL CA SCREEN DOC REV: CPT | Performed by: PSYCHIATRY & NEUROLOGY

## 2020-11-04 RX ORDER — GABAPENTIN 300 MG/1
CAPSULE ORAL
Qty: 90 CAPSULE | Refills: 2 | Status: SHIPPED | OUTPATIENT
Start: 2020-11-04 | End: 2021-12-15

## 2020-11-04 RX ORDER — AMITRIPTYLINE HYDROCHLORIDE 25 MG/1
TABLET, FILM COATED ORAL
Qty: 30 TABLET | Refills: 3 | Status: SHIPPED | OUTPATIENT
Start: 2020-11-04 | End: 2021-04-30

## 2020-11-04 ASSESSMENT — ENCOUNTER SYMPTOMS
BACK PAIN: 1
ALLERGIC/IMMUNOLOGIC NEGATIVE: 1
EYES NEGATIVE: 1
COUGH: 1

## 2020-11-04 NOTE — PROGRESS NOTES
Subjective:      Patient ID: Nita Reyes is a 63 y.o. female. HPI  Active problem low back pain with right lower extremity radicular pain on neurontin along with right meralgia paresthetica . The condition is she reports that pain is partially attenuated on neurontin by 40 to 60 percent taking this only at night with morning dose making her groggy  . She is also on elavil 25 mg po qhs . Right leg pain is at grade 7 over 10 with pain increasing with activity or prolonged sitting going down outer right thigh and calf going to foot to outer right three toes . PT was of no help . She has had numbness of right leg for the past one year . She has diabetes for 3 years being on metformin and glimepiride and trulicity . Numbness began in the right lateral thigh that was associated with burning which is there all the time quite sensitive being unable to lay on that side . There is also numbness in outer part of top of right foot and right outer three toes . At night she will have restless legs only partially attenuated with neuronin . There are no bowel or bladder problems . Significant medications neurontin 600 mg po qhs, elavil 25 mg po qhs  . Testing Hga1c 6.8 , October 2019 .  Lumbar xray multi level denerative disc disease and facet arthropathy      Past Medical History:   Diagnosis Date    Arthritis     Bulging disc 07/13    c4 c5    Cancer [facility])     uterus cells found    Cerebral artery occlusion with cerebral infarction ([facility] [address]     tia    Depression 10/3/2014    Diabetic neuropathy (HCC)     all L.L.    Glaucoma     LT EYE    History of blood transfusion     1976 AFTER CHILDBIRTH    Hyperlipidemia     Hypertension     Irregular heart beat     hx of    TIA (transient ischemic attack) 07/22/13    Type II or unspecified type diabetes mellitus without mention of complication, not stated as uncontrolled     Wears glasses     for reading       Past Surgical History:   Procedure Laterality Date    Needs    Financial resource strain: Not on file    Food insecurity     Worry: Not on file     Inability: Not on file    Transportation needs     Medical: Not on file     Non-medical: Not on file   Tobacco Use    Smoking status: Never Smoker    Smokeless tobacco: Never Used   Substance and Sexual Activity    Alcohol use: Yes     Comment: rare    Drug use: No    Sexual activity: Never   Lifestyle    Physical activity     Days per week: Not on file     Minutes per session: Not on file    Stress: Not on file   Relationships    Social connections     Talks on phone: Not on file     Gets together: Not on file     Attends Buddhism service: Not on file     Active member of club or organization: Not on file     Attends meetings of clubs or organizations: Not on file     Relationship status: Not on file    Intimate partner violence     Fear of current or ex partner: Not on file     Emotionally abused: Not on file     Physically abused: Not on file     Forced sexual activity: Not on file   Other Topics Concern    Not on file   Social History Narrative    Not on file       Current Outpatient Medications   Medication Sig Dispense Refill    amitriptyline (ELAVIL) 25 MG tablet Take 1 po qhs 30 tablet 3    gabapentin (NEURONTIN) 300 MG capsule Take 1po qAM and 2 po qhs 90 capsule 2    TRULICITY 1.5 RA/2.1OR SOPN       diclofenac sodium (VOLTAREN) 1 % GEL Apply topically 4 times daily as needed for Pain 1 Tube 5    traZODone (DESYREL) 100 MG tablet take 1 AND 1/2 to 2 tablets by mouth at bedtime if needed      escitalopram (LEXAPRO) 10 MG tablet take 1 AND 1/2 TABLETS by mouth once daily      clotrimazole-betamethasone (LOTRISONE) 1-0.05 % cream Apply topically 2 times daily. 45 g 2    gabapentin (NEURONTIN) 600 MG tablet Take 1 po bid 60 tablet 2    clotrimazole-betamethasone (LOTRISONE) 1-0.05 % cream Apply topically 2 times daily.  45 g 2    losartan (COZAAR) 50 MG tablet take 1 tablet by mouth once daily 90 tablet 0    metFORMIN (GLUCOPHAGE) 1000 MG tablet take 1 tablet by mouth once daily (PATIENT TAKES AT NIGHT) 90 tablet 0    atorvastatin (LIPITOR) 10 MG tablet Take 1 tablet by mouth daily 90 tablet 0    blood glucose test strips (ONE TOUCH ULTRA TEST) strip Use once daily to check  strip 0    Lancets 30G MISC 1 each by Does not apply route daily Use to check BS BID Dx E 11.9 100 each 0    vitamin D (ERGOCALCIFEROL) 1.25 MG (16720 UT) CAPS capsule take 1 capsule by mouth every week (Patient taking differently: Indications: on Sunday take 1 capsule by mouth every week) 6 capsule 0    Blood Glucose Monitoring Suppl (ONE TOUCH ULTRA 2) w/Device KIT use as directed DUE TO PATIENTS INSURANCE, WE NEED TO GET A NEW METER FOR ONE TOUCH, DUE TO INS. [employer] 1 kit 0    aspirin 81 MG tablet Take 81 mg by mouth daily.  ARIPiprazole (ABILIFY) 10 MG tablet take 1 tablet by mouth once daily      L-methylfolate-B6-B12 (METANX) 3-90.314-2-35 MG CAPS capsule Take 1 capsule by mouth 2 times daily (Patient not taking: Reported on 10/30/2020) 60 capsule 5     No current facility-administered medications for this visit. Allergies   Allergen Reactions    Lisinopril Shortness Of Breath     SOB and cough    Codeine Hives       Review of Systems   Constitutional: Positive for fatigue. Eyes: Negative. Respiratory: Positive for cough. Cardiovascular: Positive for leg swelling. Endocrine: Negative. Genitourinary: Negative. Musculoskeletal: Positive for arthralgias and back pain. Skin: Negative. Allergic/Immunologic: Negative. Neurological: Positive for numbness. Hematological: Negative. Psychiatric/Behavioral: Positive for dysphoric mood. The patient is nervous/anxious. Objective:   Physical Exam  There were no vitals filed for this visit. weight:      Neurological Examination  Constitutional .General exam well groomed   Head/Ears /Nose/Throat: external ear . Normal exam  Neck and thyroid . Normal size. No bruits  Respiratory . Breathsounds clear bilaterally  Cardiovascular: Auscultation of heart with regular rate and rhythm  Musculoskeletal. Muscle bulk and tone normal                                                           Muscle strength 5/5 strength throughout                                                                                No dysmetria or dysdiadokinesis  No tremor   Normal fine motor  Gait normal   Orientation Alert and oriented x 3   Attention and concentration normal  Short term memory normal  Language process and speech normal . No aphasia   Cranial nerve 2 normal acuety and visual fields  Cranial nerve 3, 4 and 6 . Extraocular muscles are intact . Pupils are equal and reactive   Cranial nerve 5 . Intact corneal reflex. Normal facial sensation  Cranial nerve 7 normal exam   Cranial nerve 8. Grossly intact hearing   Cranial nerve 9 and 10. Symmetric palate elevation   Cranial nerve 11 , 5 out of 5 strength   Cranial Nerve 12 midline tongue . No atrophy  Sensation . Normal proprioception . Mild reduction vibration at toe level . decreased pinprick and light touch outer right lateral thigh and lateral right dorsal foot and last three toes   Deep Tendon Reflexes normal  Plantar response flexor bilaterally    Assessment:       Diagnosis Orders   1. Low back pain with sciatica, sciatica laterality unspecified, unspecified back pain laterality, unspecified chronicity     2. Lumbar radiculopathy     3.  Meralgia paresthetica of right side     Will readjust neurontin to 300 mg po q  mg po qhs keeping he marcia elavil 25 mg po qhs       Plan:      As above         Nancy Kaufman MD

## 2021-04-30 NOTE — TELEPHONE ENCOUNTER
Pharmacy requesting refill of Elavil 25 mg.      Medication active on med list yes      Date last ordered: 11/4/2020  verified on 4/30/2021  verified by LARA Palomo LPN      Date of last appointment 11/4/2020    Next Visit Date:  6/23/2021

## 2021-05-03 RX ORDER — AMITRIPTYLINE HYDROCHLORIDE 25 MG/1
TABLET, FILM COATED ORAL
Qty: 90 TABLET | Refills: 0 | Status: SHIPPED | OUTPATIENT
Start: 2021-05-03

## 2021-10-06 NOTE — CONSULTS
[] [facility]  [address]  [phone]  F: [phone] [x] [address]  [facility]   [address]  P: [phone]  F: [phone] [] [place]  [address]  P: [phone]  F: [phone] [] [address]  [facility]   [address]  [place]: [phone]  F: [phone]     Physical Therapy Spine Evaluation    Date:  4/3/2019  Patient: Gema Estrada  : 1960  MRN: [id number]  Physician: Dr. Kayla De Luna MD   Insurance: [facility] 30 Vs  Medical Diagnosis: M54.51 - Right-sided low back pain with right-sided sciatica, unspecified chronicity    Rehab Codes: M54.5, M25.551, M79.604, M79.605, M62.81, R26.2  Onset Date: 3/26/19 referral  Next 's appt. : 19    Subjective:   CC: Didier Bertrand is a 60year old female complaining of low back pain with radiation of symptoms down bilateral lower extremities. Patient reports (R) sided pain and (R) lower extremity radiation greater than left sided. Patient reports increased pain and difficulty with prolonged positioning, including sitting, standing, and ambulation. Patient states that she feels as if she has to give her self a second after performing transfers due to her legs feeling weak. If standing for prolonged periods of time, patient reports having to off weight to one lower extremity to \"shake out\" the contralateral. Patient has to weight shift in sitting and standing. Pt with difficulty performing stairs, requiring step-to gait pattern on ascending with upper extremity support and descending turned to the side. Pt has difficulty sleeping due to inability to lie on her right side and feeling increased restlessness in her legs. She reports tingling down bilateral lower extremities, specifically in her shins and feet. HPI: Patient reports breaking her tailbone about 10 years ago. (R)-sided low back pain has increased over the past year. Patient with history of diabetic neuropathy    PMHx: [] Unremarkable [x] Diabetes [x] HTN  [] Pacemaker   [] MI/Heart Problems [] Cancer [] Arthritis [x] Other: depression, diabetic neuropathy, TIA (7/2013)              [] Refer to full medical chart  In EPIC   Tests: [x] X-Ray: ordered [] MRI:  [] Other:    Medications: [x] Refer to full medical record [] None [] Other:  Allergies:      [x] Refer to full medical record [] None [] Other:    Function:  Hand Dominance  [x] Right  [] Left  Working:  [x] Not Employed     Pain:  [x] Yes  [] No Location: low back and LEs, right side > left Pain Rating: (0-10 scale) 7/10  Pain altered Tx:  [x] Yes  [] No  Action: increased with right sided stretching and prolonged positioning  Symptoms: [x] Worsening  Better: [x] Other: changing positions, brief relief with heat, rest  Worse:   [x] PM    [x] Sit    [x] Rise/Sit    [x]Stand    [x] Walk    [x] Lying    [x] Bend    [x] Other: stairs  Sleep:  [x] Disturbed - unable to lie on R side, increased leg restlessness    Objective:      STRENGTH  STRENGTH  ROM    Left Right  Left Right Cervical    C5 Shld Abd   L1-2 Hip Flex 4+ 4+ Flexion    Shld Flexion   Hip Abd 4+ 4+ Extension    Shld IR   L3-4 Knee Ext 4+ 4+ Rotation L R   Shld ER   L4 Ankle DF 4+ 4+ Sidebend L R   C6 Elb Flex   L5 EHL   Retraction    C7 Elb Ext   S1 Plant.  Flex 4+ 4+ Lumbar    C8 EPL   Abdominals 3+ 3+ Flexion 118 *pain   T1 Fing Abd   Erector Spinae   Extension 25         Rotation L 20% impaired R 20% impaired      Hip ext 4+ 4 Sidebend L 25 R 35      Hip add 4+ 4+ UE/LE        Knee flex 4+ 4                                                        TESTS (+/-) LEFT RIGHT Not Tested   SLR [] sit [] supine   []   Hamstring (SLR) - - []   SKTC - + []   DKTC - + []   Slump/Dural - + []   SI JT   []   SHAMAR - + []   Joint Mobility hypo hypo [] no tightness in (R) piriformis. Patient would benefit from skilled physical therapy intervention to increase ROM, strength, functional activity tolerance, and to decrease pain. Problems:    [x] ? Back Pain: low back pain, 7/10    [] ? Cervical Pain:    [x] ? ROM: decreased lumbar AROM in all directions     [x] ? Strength: global bilateral lower extremity strength 4 to 4+/5   [x] ? Function: Oswestry Low Back Pain Scale: 36/50, 72% impairment  [x] Postural Deviations  [] Gait Deviations  [] Other:      STG: (to be met in 8 treatments)  1. ? Pain: Patient will report decreased back pain to 5/10 following prolonged standing, walking, and sitting. 2. ? ROM: Patient will increase lumbar AROM into extension by 10 degrees in order to improve posture in prolonged positions such as sitting and standing. 3. ? Strength: Patient will increase core strength to 4/5 in order to stabilize pelvis and decrease strain placed on her lower back in functional positions such as bending and lifting. 4. ? Function: Patient will demonstrate improved functional activity tolerance as evident by an improved score on the Oswestry to less than 60% impairment. 5. Independent with Home Exercise Programs  LTG: (to be met in 16 treatments)  6.  ? Pain: Patient will report decreased back pain to 3/10 following prolonged standing, walking, and sitting. 7. ? ROM: Patient will increase lumbar AROM into side bending by 10 degrees in order to improve posture in prolonged positions such as sitting and standing. 8. ? Strength: Patient will increase global lower extremity strength to 5/5 in order to increase tolerance to stair climbing. 9. ? Function:  a. Patient will demonstrate improved functional activity tolerance as evident by an improved score on the Oswestry to less than 50% impairment.   b. Patient will report ability to stand and sit for 10 minutes with symmetrical weight distribution and without needing to shift weight to increase tolerance to (limitations, restrictions) [] 1-2 [x] 3 [] 4+   Clinical presentation (progression) [] Stable [x] Evolving  [] Unstable   Decision Making [] Low [x] Moderate [] High    [] Low Complexity [x] Moderate Complexity [] High Complexity       Treatment Charges: Mins Units   [x] Evaluation       []  Low       [x]  Moderate       []  High 40 1   []  Modalities     [x]  Ther Exercise 15 1   []  Manual Therapy     []  Ther Activities     []  Aquatics     []  Vasocompression     []  Other       TOTAL TREATMENT TIME: 55    Time in: 1400      Time out: 1500    Electronically signed by: Siva Maldonado PT        Physician Signature:________________________________Date:__________________  By signing above or cosigning this note, I have reviewed this plan of care and certify a need for medically necessary rehabilitation services.      *PLEASE SIGN ABOVE AND FAX BACK ALL PAGES*

## 2021-12-14 DIAGNOSIS — G62.9 NEUROPATHY: Primary | ICD-10-CM

## 2021-12-14 DIAGNOSIS — M79.672 PAIN, FOOT, LEFT, CHRONIC: ICD-10-CM

## 2021-12-14 DIAGNOSIS — G89.29 PAIN, FOOT, LEFT, CHRONIC: ICD-10-CM

## 2021-12-15 NOTE — TELEPHONE ENCOUNTER
Pharmacy requesting a  refill of: Gabapentin 300mg     Medication active on med list yes     Date of last prescription: 11/04/2020  with 2  refills verified on 12/15/2021  verified by SHE KARMII     Date of last appointment: 11/04/2020     Next Visit Date:  None, message left to schedule follow up appointment.

## 2021-12-17 RX ORDER — GABAPENTIN 300 MG/1
CAPSULE ORAL
Qty: 90 CAPSULE | Refills: 5 | Status: SHIPPED | OUTPATIENT
Start: 2021-12-17 | End: 2022-07-08

## 2022-07-08 DIAGNOSIS — G62.9 NEUROPATHY: ICD-10-CM

## 2022-07-08 DIAGNOSIS — M79.672 PAIN, FOOT, LEFT, CHRONIC: ICD-10-CM

## 2022-07-08 DIAGNOSIS — G89.29 PAIN, FOOT, LEFT, CHRONIC: ICD-10-CM

## 2022-07-08 RX ORDER — GABAPENTIN 300 MG/1
CAPSULE ORAL
Qty: 90 CAPSULE | Refills: 2 | Status: SHIPPED | OUTPATIENT
Start: 2022-07-08 | End: 2022-10-08

## 2022-07-08 NOTE — TELEPHONE ENCOUNTER
Pharmacy requesting refill of gabapentin (NEURONTIN) 300 MG capsule      Medication active on med list yes      Date of last Rx: 12/17/2021 with 5 refills          verified by PAULA PAIGE      Date of last appointment 11/4/2020    Next Visit Date: Visit date not found, patient was to return in 3 months time (2/4/2021). Will try to send patient a [employer] message to schedule a follow up.

## 2022-11-28 ENCOUNTER — TELEPHONE (OUTPATIENT)
Dept: NEUROLOGY | Age: 62
End: 2022-11-28

## 2022-11-28 NOTE — TELEPHONE ENCOUNTER
11 28 2022 called patient 3 times to reschedule, calling restrictions, unable to reach patient, no response, cancelled appointment and mailed letter.   KS

## 2023-06-06 ENCOUNTER — TELEPHONE (OUTPATIENT)
Dept: PHARMACY | Facility: CLINIC | Age: 63
End: 2023-06-06

## 2023-06-06 NOTE — TELEPHONE ENCOUNTER
ASCVD Risk score (Eliot LUQUE, et al., 2019) failed to calculate for the following reasons: The systolic blood pressure is missing    Cannot find a previous HDL lab    Cannot find a previous total cholesterol lab     PLAN  The following are interventions that have been identified:   Patient overdue refilling Trulicity, atorvastatin and active on home medication list.   Had been filled both both reversed  Unclear attribution - seeing Obdulia Palacio PCP Kate Barkley (multiple visits in 2023); attributed provider per Meliton Brown    Attempting to reach patient to review - unable to leave message. Letter sent including to update PCP with [facility]. Brady Boas, PharmD, [address]   Department, toll free: [phone], option 1     For Pharmacy Admin Tracking Only    Program: [address] in place:  No  Gap Closed?: No   Time Spent (min): 10

## (undated) DEVICE — BANDAGE GZ W2XL75IN ST RAYON POLY CNFRM STRTCH LTWT

## (undated) DEVICE — SUTURE VCRL + SZ 4-0 L27IN ABSRB WHT FS-2 3/8 CIR REV CUT VCP422H

## (undated) DEVICE — MASTISOL ADHESIVE LIQ 2/3ML

## (undated) DEVICE — BASIN EMSIS 700ML GRAPHITE PLAS KID SHP GRAD

## (undated) DEVICE — GAUZE,SPONGE,4"X4",16PLY,STRL,LF,10/TRAY: Brand: MEDLINE

## (undated) DEVICE — Device

## (undated) DEVICE — HYPODERMIC SAFETY NEEDLE: Brand: MAGELLAN

## (undated) DEVICE — CUP MED 1OZ CLR POLYPR FEED GRAD W/O LID

## (undated) DEVICE — MERCY HEALTH ST CHARLES: Brand: MEDLINE INDUSTRIES, INC.

## (undated) DEVICE — DRESSING PETRO W3XL3IN OIL EMUL N ADH GZ KNIT IMPREG CELOS

## (undated) DEVICE — GLOVE SURG 8.5 PF POLYMER WHT STRL SIGN LTX ESSENTIAL LTX

## (undated) DEVICE — SINGLE PORT MANIFOLD: Brand: NEPTUNE 2

## (undated) DEVICE — SYRINGE, LUER LOCK, 10ML: Brand: MEDLINE

## (undated) DEVICE — STRIP,CLOSURE,WOUND,MEDI-STRIP,1/2X4: Brand: MEDLINE

## (undated) DEVICE — SOLUTION IV IRRIG POUR BRL 0.9% SODIUM CHL 2F7124

## (undated) DEVICE — BLANKET WRM W29.9XL79.1IN UP BODY FORC AIR MISTRAL-AIR

## (undated) DEVICE — ZIMMER® STERILE DISPOSABLE TOURNIQUET CUFF WITH PLC, DUAL PORT, SINGLE BLADDER, 18 IN. (46 CM)

## (undated) DEVICE — GLOVE SURG SZ 85 STD WHT LTX SYN POLYMER BEAD REINF ANTI RL

## (undated) DEVICE — LARGE TEAR CROSS CUT RASP, 7MM X 14MM: Brand: MICROAIRE®

## (undated) DEVICE — GAUZE,SPONGE,FLUFF,6"X6.75",STRL,5/TRAY: Brand: MEDLINE

## (undated) DEVICE — SUTURE VCRL + SZ 2-0 L27IN ABSRB UD CT-2 L26MM 1/2 CIR TAPR VCP269H

## (undated) DEVICE — SUTURE MCRYL + SZ 4-0 L27IN ABSRB UD L19MM PS-2 3/8 CIR MCP426H